# Patient Record
Sex: FEMALE | Race: WHITE | NOT HISPANIC OR LATINO | Employment: FULL TIME | URBAN - METROPOLITAN AREA
[De-identification: names, ages, dates, MRNs, and addresses within clinical notes are randomized per-mention and may not be internally consistent; named-entity substitution may affect disease eponyms.]

---

## 2017-02-23 ENCOUNTER — TRANSCRIBE ORDERS (OUTPATIENT)
Dept: RADIOLOGY | Facility: HOSPITAL | Age: 52
End: 2017-02-23

## 2017-02-23 ENCOUNTER — HOSPITAL ENCOUNTER (OUTPATIENT)
Dept: RADIOLOGY | Facility: HOSPITAL | Age: 52
Discharge: HOME | End: 2017-02-23
Attending: RADIOLOGY

## 2017-02-23 DIAGNOSIS — R07.9 CHEST PAIN, UNSPECIFIED: ICD-10-CM

## 2017-02-23 DIAGNOSIS — R07.9 CHEST PAIN, UNSPECIFIED: Primary | ICD-10-CM

## 2017-02-23 PROCEDURE — 71101 X-RAY EXAM UNILAT RIBS/CHEST: CPT

## 2017-03-11 ENCOUNTER — ALLSCRIPTS OFFICE VISIT (OUTPATIENT)
Dept: OTHER | Facility: OTHER | Age: 52
End: 2017-03-11

## 2017-03-11 ENCOUNTER — APPOINTMENT (OUTPATIENT)
Dept: LAB | Facility: HOSPITAL | Age: 52
End: 2017-03-11
Payer: COMMERCIAL

## 2017-03-11 DIAGNOSIS — R50.9 FEVER: ICD-10-CM

## 2017-03-11 LAB
FLUAV AG SPEC QL IA: NEGATIVE
FLUBV AG SPEC QL IA: NEGATIVE

## 2017-03-11 PROCEDURE — 87798 DETECT AGENT NOS DNA AMP: CPT

## 2017-03-11 PROCEDURE — 87400 INFLUENZA A/B EACH AG IA: CPT

## 2017-03-12 LAB
FLUAV AG SPEC QL: NORMAL
FLUBV AG SPEC QL: NORMAL
RSV B RNA SPEC QL NAA+PROBE: NORMAL

## 2017-03-29 ENCOUNTER — APPOINTMENT (OUTPATIENT)
Dept: LAB | Facility: CLINIC | Age: 52
End: 2017-03-29
Payer: COMMERCIAL

## 2017-03-29 ENCOUNTER — TRANSCRIBE ORDERS (OUTPATIENT)
Dept: LAB | Facility: CLINIC | Age: 52
End: 2017-03-29

## 2017-03-29 DIAGNOSIS — R31.29 MICROSCOPIC HEMATURIA: ICD-10-CM

## 2017-03-29 DIAGNOSIS — R31.29 MICROSCOPIC HEMATURIA: Primary | ICD-10-CM

## 2017-03-29 DIAGNOSIS — R31.29 OTHER MICROSCOPIC HEMATURIA: ICD-10-CM

## 2017-03-29 LAB
BACTERIA UR QL AUTO: ABNORMAL /HPF
BILIRUB UR QL STRIP: NEGATIVE
CLARITY UR: CLEAR
COLOR UR: YELLOW
GLUCOSE UR STRIP-MCNC: NEGATIVE MG/DL
HGB UR QL STRIP.AUTO: ABNORMAL
KETONES UR STRIP-MCNC: NEGATIVE MG/DL
LEUKOCYTE ESTERASE UR QL STRIP: ABNORMAL
NITRITE UR QL STRIP: NEGATIVE
NON-SQ EPI CELLS URNS QL MICRO: ABNORMAL /HPF
PH UR STRIP.AUTO: 7.5 [PH] (ref 4.5–8)
PROT UR STRIP-MCNC: NEGATIVE MG/DL
RBC #/AREA URNS AUTO: ABNORMAL /HPF
SP GR UR STRIP.AUTO: 1.01 (ref 1–1.03)
UROBILINOGEN UR QL STRIP.AUTO: 0.2 E.U./DL
WBC #/AREA URNS AUTO: ABNORMAL /HPF

## 2017-03-29 PROCEDURE — 81001 URINALYSIS AUTO W/SCOPE: CPT

## 2017-03-29 PROCEDURE — 88112 CYTOPATH CELL ENHANCE TECH: CPT

## 2017-04-03 ENCOUNTER — ALLSCRIPTS OFFICE VISIT (OUTPATIENT)
Dept: OTHER | Facility: OTHER | Age: 52
End: 2017-04-03

## 2017-04-14 ENCOUNTER — HOSPITAL ENCOUNTER (OUTPATIENT)
Dept: MRI IMAGING | Facility: HOSPITAL | Age: 52
Discharge: HOME | End: 2017-04-14
Attending: SURGERY
Payer: COMMERCIAL

## 2017-04-14 DIAGNOSIS — K86.2 CYST OF PANCREAS: ICD-10-CM

## 2017-04-14 PROCEDURE — 74183 MRI ABD W/O CNTR FLWD CNTR: CPT

## 2017-04-14 PROCEDURE — A9585 GADOBUTROL INJECTION: HCPCS | Performed by: SURGERY

## 2017-04-14 RX ADMIN — GADOBUTROL 6 ML: 604.72 INJECTION INTRAVENOUS at 17:30

## 2017-06-16 ENCOUNTER — HOSPITAL ENCOUNTER (EMERGENCY)
Facility: HOSPITAL | Age: 52
Discharge: HOME | End: 2017-06-16
Attending: EMERGENCY MEDICINE | Admitting: EMERGENCY MEDICINE
Payer: OTHER MISCELLANEOUS

## 2017-06-16 VITALS
DIASTOLIC BLOOD PRESSURE: 85 MMHG | TEMPERATURE: 98.5 F | HEART RATE: 92 BPM | SYSTOLIC BLOOD PRESSURE: 127 MMHG | RESPIRATION RATE: 17 BRPM | OXYGEN SATURATION: 96 % | BODY MASS INDEX: 25.71 KG/M2 | WEIGHT: 160 LBS | HEIGHT: 66 IN

## 2017-06-16 DIAGNOSIS — IMO0002 NEEDLE STICK INJURY: Primary | ICD-10-CM

## 2017-06-16 LAB — ALT SERPL W P-5'-P-CCNC: 28 U/L (ref 12–78)

## 2017-06-16 PROCEDURE — 90715 TDAP VACCINE 7 YRS/> IM: CPT | Performed by: EMERGENCY MEDICINE

## 2017-06-16 PROCEDURE — 87340 HEPATITIS B SURFACE AG IA: CPT | Performed by: EMERGENCY MEDICINE

## 2017-06-16 PROCEDURE — 36415 COLL VENOUS BLD VENIPUNCTURE: CPT | Performed by: EMERGENCY MEDICINE

## 2017-06-16 PROCEDURE — 86706 HEP B SURFACE ANTIBODY: CPT | Performed by: EMERGENCY MEDICINE

## 2017-06-16 PROCEDURE — 84460 ALANINE AMINO (ALT) (SGPT): CPT | Performed by: EMERGENCY MEDICINE

## 2017-06-16 PROCEDURE — 99283 EMERGENCY DEPT VISIT LOW MDM: CPT

## 2017-06-16 PROCEDURE — 90471 IMMUNIZATION ADMIN: CPT

## 2017-06-16 PROCEDURE — 87389 HIV-1 AG W/HIV-1&-2 AB AG IA: CPT | Performed by: EMERGENCY MEDICINE

## 2017-06-16 PROCEDURE — 86803 HEPATITIS C AB TEST: CPT | Performed by: EMERGENCY MEDICINE

## 2017-06-16 RX ADMIN — TETANUS TOXOID, REDUCED DIPHTHERIA TOXOID AND ACELLULAR PERTUSSIS VACCINE, ADSORBED 0.5 ML: 5; 2.5; 8; 8; 2.5 SUSPENSION INTRAMUSCULAR at 14:32

## 2017-06-17 LAB
HBV SURFACE AB SER-ACNC: 211.26 MIU/ML
HBV SURFACE AG SER QL: NORMAL
HCV AB SER QL: NORMAL

## 2017-06-19 LAB — HIV 1+2 AB+HIV1 P24 AG SERPL QL IA: NORMAL

## 2017-07-13 ENCOUNTER — APPOINTMENT (OUTPATIENT)
Dept: LAB | Facility: HOSPITAL | Age: 52
End: 2017-07-13
Payer: COMMERCIAL

## 2017-07-13 ENCOUNTER — TRANSCRIBE ORDERS (OUTPATIENT)
Dept: LAB | Facility: HOSPITAL | Age: 52
End: 2017-07-13

## 2017-07-13 DIAGNOSIS — Z00.8 HEALTH EXAMINATION IN POPULATION SURVEY: Primary | ICD-10-CM

## 2017-07-13 DIAGNOSIS — Z00.8 HEALTH EXAMINATION IN POPULATION SURVEY: ICD-10-CM

## 2017-07-13 LAB
CHOLEST SERPL-MCNC: 208 MG/DL (ref 50–200)
EST. AVERAGE GLUCOSE BLD GHB EST-MCNC: 117 MG/DL
HBA1C MFR BLD: 5.7 % (ref 4.2–6.3)
HDLC SERPL-MCNC: 63 MG/DL (ref 40–60)
LDLC SERPL CALC-MCNC: 134 MG/DL (ref 0–100)
TRIGL SERPL-MCNC: 55 MG/DL

## 2017-07-13 PROCEDURE — 83036 HEMOGLOBIN GLYCOSYLATED A1C: CPT

## 2017-07-13 PROCEDURE — 80061 LIPID PANEL: CPT

## 2017-07-13 PROCEDURE — 36415 COLL VENOUS BLD VENIPUNCTURE: CPT

## 2017-07-24 ENCOUNTER — ALLSCRIPTS OFFICE VISIT (OUTPATIENT)
Dept: OTHER | Facility: OTHER | Age: 52
End: 2017-07-24

## 2017-07-24 ENCOUNTER — HOSPITAL ENCOUNTER (OUTPATIENT)
Dept: RADIOLOGY | Facility: HOSPITAL | Age: 52
Discharge: HOME | End: 2017-07-24
Attending: ORTHOPAEDIC SURGERY
Payer: COMMERCIAL

## 2017-07-24 DIAGNOSIS — M25.511 PAIN IN RIGHT SHOULDER: ICD-10-CM

## 2017-07-24 PROCEDURE — 73030 X-RAY EXAM OF SHOULDER: CPT

## 2017-08-29 ENCOUNTER — ALLSCRIPTS OFFICE VISIT (OUTPATIENT)
Dept: OTHER | Facility: OTHER | Age: 52
End: 2017-08-29

## 2017-09-22 ENCOUNTER — TRANSCRIBE ORDERS (OUTPATIENT)
Dept: ADMINISTRATIVE | Facility: HOSPITAL | Age: 52
End: 2017-09-22

## 2017-09-22 DIAGNOSIS — M25.511 ACUTE PAIN OF RIGHT SHOULDER: Primary | ICD-10-CM

## 2017-10-17 ENCOUNTER — HOSPITAL ENCOUNTER (OUTPATIENT)
Dept: RADIOLOGY | Facility: HOSPITAL | Age: 52
Discharge: HOME | End: 2017-10-17
Attending: ORTHOPAEDIC SURGERY
Payer: OTHER MISCELLANEOUS

## 2017-10-17 DIAGNOSIS — M25.511 ACUTE PAIN OF RIGHT SHOULDER: ICD-10-CM

## 2017-10-17 PROCEDURE — 73221 MRI JOINT UPR EXTREM W/O DYE: CPT

## 2017-10-23 ENCOUNTER — ALLSCRIPTS OFFICE VISIT (OUTPATIENT)
Dept: OTHER | Facility: OTHER | Age: 52
End: 2017-10-23

## 2017-10-23 DIAGNOSIS — R94.6 ABNORMAL RESULTS OF THYROID FUNCTION STUDIES: ICD-10-CM

## 2017-10-23 DIAGNOSIS — R31.29 OTHER MICROSCOPIC HEMATURIA: ICD-10-CM

## 2017-10-23 DIAGNOSIS — M25.511 PAIN IN RIGHT SHOULDER: ICD-10-CM

## 2017-10-23 DIAGNOSIS — R53.83 OTHER FATIGUE: ICD-10-CM

## 2017-11-08 ENCOUNTER — GENERIC CONVERSION - ENCOUNTER (OUTPATIENT)
Dept: OTHER | Facility: OTHER | Age: 52
End: 2017-11-08

## 2017-11-15 ENCOUNTER — HOSPITAL ENCOUNTER (OUTPATIENT)
Dept: RADIOLOGY | Facility: HOSPITAL | Age: 52
Discharge: HOME | End: 2017-11-15
Payer: COMMERCIAL

## 2017-11-15 DIAGNOSIS — R94.6 ABNORMAL RESULTS OF THYROID FUNCTION STUDIES: ICD-10-CM

## 2017-11-15 PROCEDURE — 76536 US EXAM OF HEAD AND NECK: CPT

## 2017-11-16 ENCOUNTER — TRANSCRIBE ORDERS (OUTPATIENT)
Dept: LAB | Facility: HOSPITAL | Age: 52
End: 2017-11-16

## 2017-11-16 DIAGNOSIS — R31.29 MICROSCOPIC HEMATURIA: ICD-10-CM

## 2017-11-16 DIAGNOSIS — M25.511 RIGHT SHOULDER PAIN, UNSPECIFIED CHRONICITY: Primary | ICD-10-CM

## 2017-11-16 DIAGNOSIS — R53.83 OTHER FATIGUE: ICD-10-CM

## 2017-11-17 ENCOUNTER — APPOINTMENT (OUTPATIENT)
Dept: LAB | Facility: HOSPITAL | Age: 52
End: 2017-11-17
Payer: COMMERCIAL

## 2017-11-17 DIAGNOSIS — M25.511 RIGHT SHOULDER PAIN, UNSPECIFIED CHRONICITY: ICD-10-CM

## 2017-11-17 DIAGNOSIS — R31.29 MICROSCOPIC HEMATURIA: ICD-10-CM

## 2017-11-17 DIAGNOSIS — R53.83 OTHER FATIGUE: ICD-10-CM

## 2017-11-17 LAB
ALBUMIN SERPL BCP-MCNC: 3.8 G/DL (ref 3.5–5)
ALP SERPL-CCNC: 62 U/L (ref 46–116)
ALT SERPL W P-5'-P-CCNC: 23 U/L (ref 12–78)
ANION GAP SERPL CALCULATED.3IONS-SCNC: 5 MMOL/L (ref 4–13)
AST SERPL W P-5'-P-CCNC: 16 U/L (ref 5–45)
BILIRUB SERPL-MCNC: 0.48 MG/DL (ref 0.2–1)
BUN SERPL-MCNC: 13 MG/DL (ref 5–25)
CALCIUM SERPL-MCNC: 9 MG/DL (ref 8.3–10.1)
CHLORIDE SERPL-SCNC: 105 MMOL/L (ref 100–108)
CO2 SERPL-SCNC: 29 MMOL/L (ref 21–32)
CREAT SERPL-MCNC: 0.66 MG/DL (ref 0.6–1.3)
GFR SERPL CREATININE-BSD FRML MDRD: 101 ML/MIN/1.73SQ M
GLUCOSE P FAST SERPL-MCNC: 81 MG/DL (ref 65–99)
IRON SERPL-MCNC: 98 UG/DL (ref 50–170)
POTASSIUM SERPL-SCNC: 4.1 MMOL/L (ref 3.5–5.3)
PROT SERPL-MCNC: 7.7 G/DL (ref 6.4–8.2)
SODIUM SERPL-SCNC: 139 MMOL/L (ref 136–145)
TSH SERPL DL<=0.05 MIU/L-ACNC: 2.3 UIU/ML (ref 0.36–3.74)

## 2017-11-17 PROCEDURE — 36415 COLL VENOUS BLD VENIPUNCTURE: CPT

## 2017-11-17 PROCEDURE — 83540 ASSAY OF IRON: CPT

## 2017-11-17 PROCEDURE — 80053 COMPREHEN METABOLIC PANEL: CPT

## 2017-11-17 PROCEDURE — 84443 ASSAY THYROID STIM HORMONE: CPT

## 2017-11-20 ENCOUNTER — GENERIC CONVERSION - ENCOUNTER (OUTPATIENT)
Dept: OTHER | Facility: OTHER | Age: 52
End: 2017-11-20

## 2017-11-24 ENCOUNTER — ALLSCRIPTS OFFICE VISIT (OUTPATIENT)
Dept: OTHER | Facility: OTHER | Age: 52
End: 2017-11-24

## 2017-12-19 ENCOUNTER — ALLSCRIPTS OFFICE VISIT (OUTPATIENT)
Dept: OTHER | Facility: OTHER | Age: 52
End: 2017-12-19

## 2017-12-19 PROCEDURE — 88175 CYTOPATH C/V AUTO FLUID REDO: CPT | Performed by: OBSTETRICS & GYNECOLOGY

## 2017-12-20 DIAGNOSIS — Z12.31 ENCOUNTER FOR SCREENING MAMMOGRAM FOR MALIGNANT NEOPLASM OF BREAST: ICD-10-CM

## 2017-12-20 NOTE — PROGRESS NOTES
Assessment  1  Encounter for screening mammogram for malignant neoplasm of breast (V76 12) (Z12 31)   2  Pap smear, as part of routine gynecological examination (V76 2) (Z01 419)  Pelvic exam reveals the uterus to be anterior well supported mobile normal size  No ovarian masses are detected  There is a mucousy discharge in the vagina  There is no blood present the vulva appears normal  Rectal exam shows no blood or masses in the rectum there is no nodularity in the cul-de-sac  Breast exam is normal    Plan  Encounter for screening mammogram for malignant neoplasm of breast    · * MAMMO SCREENING BILATERAL W CAD; Status:Active; Requested for:[id number];    Perform:[facility]; Due:56Rza9251; Ordered;For:Encounter for screening mammogram for malignant neoplasm of breast; Ordered By:Malina Linton;  Encounter for screening mammogram for malignant neoplasm of breast, Pap smear, aspart of routine gynecological examination    · Follow-up visit in 1 year Evaluation and Treatment  Follow-up  Status: Hold For -Scheduling  Requested for: 30ZWL1749   Ordered; For: Encounter for screening mammogram for malignant neoplasm of breast, Pap smear, as part of routine gynecological examination; Ordered By: Akua Hamilton Performed:  Due: 59EGC7539  Pap smear, as part of routine gynecological examination    · (1) THIN PREP PAP FOLLOW UP WITH IMAGING; Status:Active - RetrospectiveAuthorization; Requested for:[id number];    Perform:[facility] In Office Collection; Due:53Wuv6829; Last Updated By:Emil Bhatia; 12/19/2017 3:37:40 PM;Ordered; For:Pap smear, as part of routine gynecological examination; Ordered By:Malina Linton; Maturation index required? : No  :   HPV? : if ASCUS  Pap smear was taken at this visit  The patient was given a slip for bilateral screening mammogram to be performed after December 20, 2017  She will return in one year for follow-up GYN evaluation     Discussion/Summary    The patient was informed that her breast and pelvic exam are normal  She will call she sees any bleeding over the next year  Chief Complaint  annual exam      History of Present Illness  HPI: This 76-year-old patient has had no vaginal bleeding over the past 2 years  She has had one episode of vaginitis past year  She has no pain with intercourse  She does sometimes or liner for urine stress incontinence  At the present time she does notice that she seems to be urinating more frequently she has pressure in the lower abdomen suprapubically and she does urinate but small amounts when she does urinate  She has occasional hot flashes but no chronic headaches or fainting spells  Due to the suspicion of a cystitis I did give patient a prescription for Macrobid one twice a day after meals for 7 days  She does sleep intermittently  GYN HM, Adult Female [facility]:   General Health:  Lifestyle:  She does not use tobacco -- She consumes alcohol  She reports occasional alcohol use  -- She denies drug use  Reproductive health: the patient is postmenopausal--   she reports no menstrual problems  -- she uses no contraception  -- she is sexually active  -- pregnancy history: G 1P 1,-- 1  Screening: Cervical cancer screening includes a pap smear performed 12/2016  Breast cancer screening includes a mammogram performed 12/20/2016  Colorectal cancer screening includes a colonoscopy performed 5/11/2015  Review of Systems   Constitutional: No fever, no chills, feels well, no tiredness, no recent weight gain or loss  ENT: no ear ache, no loss of hearing, no nosebleeds or nasal discharge, no sore throat or hoarseness  Cardiovascular: no complaints of slow or fast heart rate, no chest pain, no palpitations, no leg claudication or lower extremity edema  Respiratory: no complaints of shortness of breath, no wheezing, no dyspnea on exertion, no orthopnea or PND  Breasts: no complaints of breast pain, breast lump or nipple discharge  Gastrointestinal: no complaints of abdominal pain, no constipation, no nausea or diarrhea, no vomiting, no bloody stools  Genitourinary: incontinence, but-- no complaints of dysuria, no incontinence, no pelvic pain, no dysmenorrhea, no vaginal discharge or abnormal vaginal bleeding-- and-- as noted in HPI  Musculoskeletal: no complaints of arthralgia, no myalgia, no joint swelling or stiffness, no limb pain or swelling  Integumentary: no complaints of skin rash or lesion, no itching or dry skin, no skin wounds  Neurological: no complaints of headache, no confusion, no numbness or tingling, no dizziness or fainting  Active Problems  1  Acute bacterial sinusitis (461 9) (J01 90,B96 89)   2  Arthralgia of right acromioclavicular joint (719 41) (M25 511)   3  Cyst of pancreas (577 2) (K86 2)   4  Encounter for screening colonoscopy (V76 51) (Z12 11)   5  Encounter for screening mammogram for malignant neoplasm of breast (V76 12) (Z12 31)   6  Fatigue (780 79) (R53 83)   7  Fever (780 60) (R50 9)   8  Flu-like symptoms (780 99) (R68 89)   9  Microscopic hematuria (599 72) (R31 29)   10  Nonspecific abnormal results of function study of thyroid (794 5) (R94 6)   11  Right flank pain (789 09) (R10 9)   12  Shoulder pain, right (719 41) (M25 511)   13  Situational anxiety (300 09) (F41 8)   14   Tear of lateral meniscus of knee, right, initial encounter    Past Medical History   · History of kidney disease (V13 09) (X16 043)   · History of kidney stones (V13 01) (Z87 442)   · History of Lyme disease (V12 09) (Z86 19)   · History of urinary tract infection (V13 02) (Z87 440)   · History of Plantar fasciitis, left (728 71) (M72 2)   · History of Stress incontinence, female (625 6) (N39 3)   · History of Uterine cyst (621 8) (N85 8)    Surgical History   · History of Diagnostic Cystoscopy   · History of Dilation And Curettage    Family History  Mother    · Family history of skin cancer (V16 8) (Z80 8)   · Family history of Obesity  Father    · Family history of Diabetes   · Family history of Heart problem  Maternal Grandmother    · Family history of Congestive heart failure (CHF)   · Family history of   Paternal Grandmother    · Family history of   Maternal Grandfather    · Family history of Cancer of prostate   · Family history of   Paternal Grandfather    · Family history of   Aunt    · Family history of malignant neoplasm of breast (V16 3) (Z80 3)  Uncle    · Family history of malignant neoplasm of prostate (V16 42) (Z80 42)    Social History   · Caffeine use (V49 89) (F15 90)   · Drinks coffee   · Denied: History of Drug use   · Never a smoker    Current Meds   1  Amoxicillin 875 MG Oral Tablet; Take 1 tablet twice daily; Therapy: 14SSH3532 to (Wen Cooney)  Requested for: 89KPW3262; Last Rx:2017 Ordered   2  Calcium TABS; Therapy: (Recorded:2014) to Recorded   3  Fish Oil CAPS; Therapy: (Recorded:2014) to Recorded   4  Iron TABS; Therapy: (Recorded:2014) to Recorded   5  LORazepam 0 5 MG Oral Tablet; TAKE 1 TABLET Once PRN dental work; Therapy: 06JDH4149 to (Evaluate:11Ctn2642); Last Rx:2017 Ordered   6  Multi-Vitamin TABS; TAKE 1 TABLET DAILY; Therapy: (Recorded:2014) to Recorded    Allergies  1  No Known Drug Allergies    Vitals   Recorded: 02KJQ3280 41:26GK   Systolic 533   Diastolic 74   Height 5 ft 6 in   Weight 160 lb    BMI Calculated 25 82   BSA Calculated 1 82   LMP      Physical Exam   Constitutional  General appearance: No acute distress, well appearing and well nourished  Neck  Neck: Normal, supple, trachea midline, no masses  Thyroid: Normal, no thyromegaly  Pulmonary  Respiratory effort: No increased work of breathing or signs of respiratory distress  Auscultation of lungs: Clear to auscultation  Cardiovascular  Auscultation of heart: Normal rate and rhythm, normal S1 and S2, no murmurs     Peripheral vascular exam: Normal pulses Throughout  Genitourinary  External genitalia: Normal and no lesions appreciated  Vagina: Normal, no lesions or dryness appreciated  Urethra: Normal    Urethral meatus: Normal    Bladder: Normal, soft, non-tender and no prolapse or masses appreciated  Cervix: Normal, no palpable masses  Uterus: Normal, non-tender, not enlarged, and no palpable masses  Adnexa/parametria: Normal, non-tender and no fullness or masses appreciated  Anus, perineum, and rectum: Normal sphincter tone, no masses, and no prolapse  Chest  Breasts: Normal and no dimpling or skin changes noted  Abdomen  Abdomen: Normal, non-tender, and no organomegaly noted  Liver and spleen: No hepatomegaly or splenomegaly  Examination for hernias: No hernias appreciated  Lymphatic  Palpation of lymph nodes in neck, axillae, groin and/or other locations: No lymphadenopathy or masses noted  Skin  Skin and subcutaneous tissue: Normal skin turgor and no rashes     Palpation of skin and subcutaneous tissue: Normal    Psychiatric  Orientation to person, place, and time: Normal    Mood and affect: Normal        Signatures   Electronically signed by : Laure Cantrell MD; Dec 19 2017  3:47PM EST                       (Author)

## 2017-12-21 ENCOUNTER — LAB REQUISITION (OUTPATIENT)
Dept: LAB | Facility: HOSPITAL | Age: 52
End: 2017-12-21
Payer: COMMERCIAL

## 2017-12-21 DIAGNOSIS — Z01.419 ENCOUNTER FOR GYNECOLOGICAL EXAMINATION WITHOUT ABNORMAL FINDING: ICD-10-CM

## 2017-12-28 LAB
LAB AP GYN PRIMARY INTERPRETATION: NORMAL
LAB AP LMP: NORMAL
Lab: NORMAL

## 2017-12-29 ENCOUNTER — GENERIC CONVERSION - ENCOUNTER (OUTPATIENT)
Dept: OTHER | Facility: OTHER | Age: 52
End: 2017-12-29

## 2018-01-03 ENCOUNTER — HOSPITAL ENCOUNTER (OUTPATIENT)
Dept: RADIOLOGY | Facility: HOSPITAL | Age: 53
Discharge: HOME | End: 2018-01-03
Payer: COMMERCIAL

## 2018-01-03 DIAGNOSIS — Z12.31 ENCOUNTER FOR SCREENING MAMMOGRAM FOR MALIGNANT NEOPLASM OF BREAST: ICD-10-CM

## 2018-01-03 PROCEDURE — 77067 SCR MAMMO BI INCL CAD: CPT

## 2018-01-08 ENCOUNTER — ALLSCRIPTS OFFICE VISIT (OUTPATIENT)
Dept: OTHER | Facility: OTHER | Age: 53
End: 2018-01-08

## 2018-01-08 ENCOUNTER — GENERIC CONVERSION - ENCOUNTER (OUTPATIENT)
Dept: OTHER | Facility: OTHER | Age: 53
End: 2018-01-08

## 2018-01-08 PROCEDURE — 88305 TISSUE EXAM BY PATHOLOGIST: CPT | Performed by: OBSTETRICS & GYNECOLOGY

## 2018-01-09 ENCOUNTER — LAB REQUISITION (OUTPATIENT)
Dept: LAB | Facility: HOSPITAL | Age: 53
End: 2018-01-09
Payer: COMMERCIAL

## 2018-01-09 DIAGNOSIS — N95.0 POSTMENOPAUSAL BLEEDING: ICD-10-CM

## 2018-01-09 NOTE — RESULT NOTES
Discussion/Summary   labs in normal range, Continue medications     Verified Results  (1) COMPREHENSIVE METABOLIC PANEL 94OSH1392 38:04KV Samaria Farmer     Test Name Result Flag Reference   SODIUM 139 mmol/L  136-145   POTASSIUM 4 1 mmol/L  3 5-5 3   CHLORIDE 105 mmol/L  100-108   CARBON DIOXIDE 29 mmol/L  21-32   ANION GAP (CALC) 5 mmol/L  4-13   BLOOD UREA NITROGEN 13 mg/dL  5-25   CREATININE 0 66 mg/dL  0 60-1 30   Standardized to IDMS reference method   CALCIUM 9 0 mg/dL  8 3-10 1   BILI, TOTAL 0 48 mg/dL  0 20-1 00   ALK PHOSPHATAS 62 U/L     ALT (SGPT) 23 U/L  12-78   Specimen collection should occur prior to Sulfasalazine and/or Sulfapyridine administration due to the potential for falsely depressed results  AST(SGOT) 16 U/L  5-45   Specimen collection should occur prior to Sulfasalazine administration due to the potential for falsely depressed results  ALBUMIN 3 8 g/dL  3 5-5 0   TOTAL PROTEIN 7 7 g/dL  6 4-8 2   eGFR 101 ml/min/1 73sq m     [facility] Disease Education Program recommendations are as follows:  GFR calculation is accurate only with a steady state creatinine  Chronic Kidney disease less than 60 ml/min/1 73 sq  meters  Kidney failure less than 15 ml/min/1 73 sq  meters  GLUCOSE FASTING 81 mg/dL  65-99   Specimen collection should occur prior to Sulfasalazine administration due to the potential for falsely depressed results  Specimen collection should occur prior to Sulfapyridine administration due to the potential for falsely elevated results  (1) IRON 13UOX5315 10:10AM Samaria Farmer [facility]     Test Name Result Flag Reference   IRON 98 ug/dL     Patients treated with metal-binding drugs (ie  Deferoxamine) may have depressed iron values  (1) TSH 02XZE4191 10:10AM Samaria Farmer     Test Name Result Flag Reference   TSH 2 300 uIU/mL  0 358-3 740   This is a patient instruction: This test is non-fasting  Please drink two glasses of water morning of bloodwork  Patients undergoing fluorescein dye angiography may retain small amounts of fluorescein in the body for 48-72 hours post procedure  Samples containing fluorescein can produce falsely depressed TSH values  If the patient had this procedure,a specimen should be resubmitted post fluorescein clearance            The recommended reference ranges for TSH during pregnancy are as follows:  First trimester 0 1 to 2 5 uIU/mL  Second trimester  0 2 to 3 0 uIU/mL  Third trimester 0 3 to 3 0 uIU/m

## 2018-01-09 NOTE — MISCELLANEOUS
Message  Return to work or school:   Renea Torres is under my professional care   She was seen in my office on 03/11/2017             Signatures   Electronically signed by : MARINE Shipley ; Mar 12 2017  8:34AM EST                       (Author)

## 2018-01-10 ENCOUNTER — LAB CONVERSION - ENCOUNTER (OUTPATIENT)
Dept: OTHER | Facility: OTHER | Age: 53
End: 2018-01-10

## 2018-01-11 NOTE — MISCELLANEOUS
Message  Return to work or school:   Angelique Wright is under my professional care  She was seen in my office on 11-24-17  next scheduled      Sinusitis     Bridgett Garnica       Signatures   Electronically signed by : Bridgett Garnica MD; Nov 24 2017 12:44PM EST                       (Author)

## 2018-01-12 ENCOUNTER — GENERIC CONVERSION - ENCOUNTER (OUTPATIENT)
Dept: OTHER | Facility: OTHER | Age: 53
End: 2018-01-12

## 2018-01-12 VITALS
BODY MASS INDEX: 25.79 KG/M2 | DIASTOLIC BLOOD PRESSURE: 74 MMHG | HEIGHT: 66 IN | SYSTOLIC BLOOD PRESSURE: 106 MMHG | WEIGHT: 160.5 LBS | HEART RATE: 92 BPM

## 2018-01-12 VITALS
DIASTOLIC BLOOD PRESSURE: 72 MMHG | WEIGHT: 159 LBS | BODY MASS INDEX: 25.66 KG/M2 | HEART RATE: 92 BPM | TEMPERATURE: 102.3 F | RESPIRATION RATE: 16 BRPM | SYSTOLIC BLOOD PRESSURE: 118 MMHG

## 2018-01-13 VITALS
DIASTOLIC BLOOD PRESSURE: 79 MMHG | HEIGHT: 66 IN | BODY MASS INDEX: 26.24 KG/M2 | SYSTOLIC BLOOD PRESSURE: 124 MMHG | HEART RATE: 101 BPM | WEIGHT: 163.25 LBS

## 2018-01-14 VITALS
SYSTOLIC BLOOD PRESSURE: 115 MMHG | HEART RATE: 92 BPM | DIASTOLIC BLOOD PRESSURE: 75 MMHG | BODY MASS INDEX: 26.01 KG/M2 | WEIGHT: 161.13 LBS

## 2018-01-16 NOTE — PROGRESS NOTES
Assessment    1  Flu-like symptoms (780 99) (R68 89)    Plan  Fever    · (1) RAPID INFLUENZA SCREEN with reflex to PCR; Status:Active - Retrospective By  Protocol Authorization;  Requested for:11Mar2017;   Flu-like symptoms    · Oseltamivir Phosphate 75 MG Oral Capsule (Tamiflu); take 1 tablet twice daily for 5  days   · Drink at least 6 glasses of water or juice a day ; Status:Complete;   Done: 30EBA6110   · Good hand washing is one of the best ways to control the spread of germs ;  Status:Complete;   Done: 74MIM5868   · Rest in bed until your temperature returns to normal ; Status:Complete;   Done:  29OXI1908   · Stay home from work or school until your condition is improved ; Status:Complete;    Done: 05QOH4213   · The following home treatments may soothe a sore throat ; Status:Complete;   Done:  53XBF9714   · Use a cool mist humidifier in the room ; Status:Complete;   Done: 40LNQ9347   · You may slowly resume your normal level of activity once you feel better ;  Status:Complete;   Done: 12TEC4136   · Seek Immediate Medical Attention if: Breathing starts to have a wheeze or whistling  sound ; Status:Complete;   Done: 40JMN7673   · Seek Immediate Medical Attention if: The fever continues or becomes higher ;  Status:Complete;   Done: 71OZN3197   · Seek Immediate Medical Attention if: You are feeling short of breath ; Status:Complete;    Done: 72VDM9812   · Seek Immediate Medical Attention if: You become dehydrated ; Status:Complete;   Done:  26RGE1892   · Seek Immediate Medical Attention if: You feel short of breath even while resting ;  Status:Complete;   Done: 77REJ9042   · Seek Immediate Medical Attention if: You have a fever, headache, and vomiting, or have a  stiff neck ; Status:Complete;   Done: 20AFA6757   · Seek Immediate Medical Attention if: You have pain in the chest that gets worse with  deep breathing or coughing ; Status:Complete;   Done: 21UHX1647    Discussion/Summary  Discussion Summary: Flu-like symptoms   - nasal swab sent for flu testing   - Tamiflu x 5 days prescribed   - should rest and drink plenty of fluids   - take Tylenol or Motrin as needed   - run a humidifier at home   - if symptoms persist despite treatment, follow up w/ PCP for re-check on Monday   - patient verbalizes understanding and agrees w/ treatment plan  Medication Side Effects Reviewed: Possible side effects of new medications were reviewed with the patient/guardian today  Understands and agrees with treatment plan: The treatment plan was reviewed with the patient/guardian  The patient/guardian understands and agrees with the treatment plan   Counseling Documentation With Imm: The patient was counseled regarding instructions for management, patient and family education, importance of compliance with treatment  Follow Up Instructions: Follow Up with your Primary Care Provider in 3-5 days  If your symptoms worsen, go to the nearest [facility]  Chief Complaint    1  Cold Symptoms  Chief Complaint Free Text Note Form: c/o congestion, chills, headache since last night   History of Present Illness  HPI: 44 yo female presents c/o headache, body ache, chills, sore throat, nasal congestion, rhinorrhea, cough since last night  States symptoms were of rapid onset  Felt feverish but did not take her temperature  No chest pain, SOB, or wheezing  Non-smoker  No GI sx or skin rashes  No recent travel or known sick contacts  Did get flu shot this year  Has been taking Tylenol as needed  Review of Systems  Focused-Female:   Constitutional: feeling poorly, chills and feeling tired, but as noted in HPI    ENT: as noted in HPI  Cardiovascular: no complaints of slow or fast heart rate, no chest pain, no palpitations, no leg claudication or lower extremity edema, no chest pain and no palpitations  Respiratory: cough, but as noted in HPI, no shortness of breath and no wheezing     Gastrointestinal: no complaints of abdominal pain, no constipation, no nausea or diarrhea, no vomiting, no bloody stools  Genitourinary: no complaints of dysuria, no incontinence, no pelvic pain, no dysmenorrhea, no vaginal discharge or abnormal vaginal bleeding  Musculoskeletal: myalgias, but as noted in HPI  Integumentary: no complaints of skin rash or lesion, no itching or dry skin, no skin wounds  Neurological: headache, but as noted in HPI, no numbness, no tingling, no confusion, no dizziness and no fainting  ROS Reviewed:   ROS reviewed  Active Problems    1  Cyst of pancreas (577 2) (K86 2)   2  Microscopic hematuria (599 72) (R31 29)   3  Right flank pain (789 09) (R10 9)   4  Tear of lateral meniscus of knee, right, initial encounter   5  Trigeminal neuralgia (350 1) (G50 0)    Past Medical History    1  History of kidney disease (V13 09) (Z87 448)   2  History of kidney stones (V13 01) (Z87 442)   3  History of Lyme disease (V12 09) (Z86 19)   4  History of urinary tract infection (V13 02) (Z87 440)   5  History of Plantar fasciitis, left (728 71) (M72 2)   6  History of Stress incontinence, female (625 6) (N39 3)   7  History of Uterine cyst (621 8) (N85 8)  Active Problems And Past Medical History Reviewed: The active problems and past medical history were reviewed and updated today  Family History  Mother    1  Family history of skin cancer (V16 8) (Z80 8)   2  Family history of Obesity  Father    3  Family history of Diabetes   4  Family history of Heart problem  Maternal Grandmother    5  Family history of Congestive heart failure (CHF)   6  Family history of   Paternal Grandmother    9  Family history of   Maternal Grandfather    8  Family history of Cancer of prostate   9  Family history of   Paternal Grandfather    8  Family history of   Aunt    6  Family history of malignant neoplasm of breast (V16 3) (Z80 3)  Uncle    12   Family history of malignant neoplasm of prostate (V16 42) (Z80 42)  Family History Reviewed: The family history was reviewed and updated today  Social History    · Caffeine use (V49 89) (F15 90)   · Drinks coffee   · Denied: History of Drug use   · Never a smoker  Social History Reviewed: The social history was reviewed and updated today  Surgical History    1  History of Diagnostic Cystoscopy   2  History of Dilation And Curettage  Surgical History Reviewed: The surgical history was reviewed and updated today  Current Meds   1  Calcium TABS; Therapy: (Recorded:13Oct2014) to Recorded   2  Fish Oil CAPS; Therapy: (Recorded:13Oct2014) to Recorded   3  Iron TABS; Therapy: (Recorded:13Oct2014) to Recorded   4  Lexapro 5 MG Oral Tablet; Therapy: (Recorded:14Apr2016) to Recorded   5  Multi-Vitamin TABS; TAKE 1 TABLET DAILY; Therapy: (Recorded:13Oct2014) to Recorded  Medication List Reviewed: The medication list was reviewed and updated today  Allergies    1  No Known Drug Allergies    Vitals  Signs   Recorded: 70GCD9738 12:50PM   Temperature: 99 7 F  Heart Rate: 122  Respiration: 18  Systolic: 098  Diastolic: 84  Height: 5 ft 6 in  Weight: 155 lb   BMI Calculated: 25 02  BSA Calculated: 1 79  O2 Saturation: 98, RA    Physical Exam    Constitutional   General appearance: No acute distress, well appearing and well nourished  Eyes   Conjunctiva and lids: No swelling, erythema or discharge  Pupils and irises: Equal, round and reactive to light  Ears, Nose, Mouth, and Throat   External inspection of ears and nose: Normal     Otoscopic examination: Tympanic membranes translucent with normal light reflex  Canals patent without erythema  Nasal mucosa, septum, and turbinates: Normal without edema or erythema  Oropharynx: Normal with no erythema, edema, exudate or lesions  Pulmonary   Respiratory effort: No increased work of breathing or signs of respiratory distress      Auscultation of lungs: Clear to auscultation  Cardiovascular   Auscultation of heart: Normal rate and rhythm, normal S1 and S2, without murmurs  Psychiatric   Orientation to person, place, and time: Normal     Mood and affect: Normal        Future Appointments    Date/Time Provider Specialty Site   04/21/2017 04:00 PM MARINE Hartley   Surgical Oncology [facility]   04/03/2017 08:30 AM Tangela Peters [facility] [address]     Signatures   Electronically signed by : MARINE Mcgowan ; Mar 11 2017  5:05PM EST                       (Author)

## 2018-01-17 NOTE — CONSULTS
Therapy  Rehabilitation Services Referral:   Patient Status: post-operative  Diagnosis: distal clavicel excision  Rehabilitation Services: evaluate and treat patient as needed and initiate a home exercise program    Frequency: 1-3 times per week, for 8 weeks  Please send progress report  I hereby certify that the services indicated above are medically necessary        Signatures   Electronically signed by : Marco Herbert [facility]; Oct 23 2017  2:02PM EST                       (Author)    Electronically signed by : MARINE Tenorio ; Oct 23 2017  4:35PM EST                       (Author)

## 2018-01-17 NOTE — RESULT NOTES
Discussion/Summary   stable thyroid ultrasound  No biopsy required     Verified Results  US THYROID [id number] 10:16AM Jaquelin Farmer Order Number: [id number]   Performing Comments: ? fullness R>L   - Patient Instructions: To schedule this appointment, please contact Central Scheduling at [phone]  Test Name Result Flag Reference   US THYROID (Report)     THYROID ULTRASOUND     INDICATION: R94 6: Abnormal results of thyroid function studies  History taken directly from the electronic ordering system  Question fullness right greater than left  COMPARISON: None  TECHNIQUE:  Ultrasound of the thyroid was performed with a high frequency linear transducer in transverse and sagittal planes including volumetric imaging sweeps as well as traditional still imaging technique  FINDINGS: Normal homogeneous smooth echotexture  Right lobe: 5 7 x 1 4 x 1 6 cm  Left lobe: 3 9 x 1 2 x 1 5 cm  Isthmus: 0 3 cm     Nodule #1   RIGHT midgland nodule measuring 0 7 x 0 3 x 0 5 cm  COMPOSITION: 2 points, solid or almost completely solid   ECHOGENICITY: 1 point, hyperechoic or isoechoic  SHAPE: 0 points, wider-than-tall  MARGIN: 0 points, smooth  ECHOGENIC FOCI: 0 points, none or large comet-tail artifacts  TI-RADS Score: TR 3 (3 points), Mildly suspicious  FNA if >2 5 cm  Follow if >1 5 cm  Nodule #2   LEFT midgland nodule measuring 0 8 x 0 5 x 0 7 cm  COMPOSITION: 2 points, solid or almost completely solid   ECHOGENICITY: 2 points, hypoechoic  SHAPE: 0 points, wider-than-tall  MARGIN: 0 points, smooth  ECHOGENIC FOCI: 0 points, none or large comet-tail artifacts  TI-RADS Score: TR 4 (4-6 points), Moderately suspicious  FNA if > 1 5 cm  Follow if > 1cm  IMPRESSION:     There are only 2 small thyroid nodules as described above  No nodule meets current ACR criteria for requiring biopsy or followup ultrasounds        Reference: ACR Thyroid Imaging, Reporting and Data System (TI-RADS): White Paper of the [facility]   J AM Andres Radiol 9999;53:[id number]  (additional recommendations based on American Thyroid Association 2015 guidelines )       Workstation performed: [id number]     Signed by:   Lawrence Garcia MD   11/20/17

## 2018-01-22 VITALS
HEART RATE: 76 BPM | HEIGHT: 66 IN | BODY MASS INDEX: 26.2 KG/M2 | SYSTOLIC BLOOD PRESSURE: 113 MMHG | WEIGHT: 163 LBS | DIASTOLIC BLOOD PRESSURE: 79 MMHG

## 2018-01-22 VITALS
SYSTOLIC BLOOD PRESSURE: 128 MMHG | WEIGHT: 160 LBS | DIASTOLIC BLOOD PRESSURE: 74 MMHG | HEIGHT: 66 IN | BODY MASS INDEX: 25.71 KG/M2

## 2018-01-22 VITALS
TEMPERATURE: 98.4 F | BODY MASS INDEX: 25.82 KG/M2 | DIASTOLIC BLOOD PRESSURE: 70 MMHG | WEIGHT: 160 LBS | SYSTOLIC BLOOD PRESSURE: 102 MMHG | HEART RATE: 76 BPM | RESPIRATION RATE: 12 BRPM

## 2018-01-23 NOTE — RESULT NOTES
Verified Results  * MAMMO SCREENING BILATERAL W CAD [id number] 10:44AM Adela Rosales Order Number: [id number]    - Patient Instructions: To schedule this appointment, please contact Central Scheduling at [phone]  Do not wear any perfume, powder, lotion or deodorant on breast or underarm area  Please bring your doctors order, referral (if needed) and insurance information with you on the day of the test  Failure to bring this information may result in this test being rescheduled  Arrive 15 minutes prior to your appointment time to register  On the day of your test, please bring any prior mammogram or breast studies with you that were not performed at a [facility]  Failure to bring prior exams may result in your test needing to be rescheduled  Test Name Result Flag Reference   MAMMO SCREENING BILATERAL W CAD (Report)     Patient History:   Patient is postmenopausal and had first child at age 38  Family history of breast cancer at age 28 in paternal aunt,    prostate cancer at age 61 in maternal uncle, prostate cancer at    age 48 in maternal grandfather  No Hormone Replacement Therapy   Patient has never smoked  Patient's BMI is 25 8  Reason for exam: screening, asymptomatic  Mammo Screening Bilateral W CAD: January 3, 2018 - Check In #:    [de-identified]   Bilateral MLO and CC view(s) were taken  XCCL view(s) were taken   of the right breast      Technologist: RT Latia(R)(M)   Prior study comparison: December 20, 2016, mammo screening    bilateral W CAD performed at [address] [place] [address]     December 18, 2015, bilateral digital screening mammogram    performed at [address] [place] [address]      The breast tissue is almost entirely fat  No new dominant soft    tissue mass, architectural distortion or suspicious    calcifications are noted  The skin and nipple contours are    within normal limits  No evidence of malignancy   No significant changes when compared with prior studies  ACR BI-RADSï¾® Assessments: BiRad:1 - Negative     Recommendation:   Routine screening mammogram of both breasts in 1 year  Analyzed by CAD     The patient is scheduled in a reminder system for screening    mammography  8-10% of cancers will be missed on mammography  Management of a    palpable abnormality must be based on clinical grounds  Patients   will be notified of their results via letter from our facility  Accredited by [employer] of [employer] and FDA       Transcription Location: [facility] 98: [id number]     Risk Value(s):   Tyrer-Cuzick 10 Year: 5 700%, Tyrer-Cuzick Lifetime: 20 000%,    Myriad Table: 2 6%, IDANIA 5 Year: 1 5%, NCI Lifetime: 11 6%   Signed by:   Spike Orozco MD   1/3/18

## 2018-01-23 NOTE — MISCELLANEOUS
Message   Recorded as Task   Date: 01/02/2018 02:01 PM, Created By: Tanika Persaud   Task Name: Medical Complaint Callback   Assigned To: Whitney Samuels   Regarding Patient: Daxa Hunter, Status: In Progress   Comment:    Tanika Persaud - 02 Jan 2018 2:01 PM     TASK CREATED  Caller: Self; Medical Complaint; [phone] (Home); [phone] x,,,,, (Work)  Patient called, she saw Dr Dilcia Urban on 12/19, he treated her for a UTI, she finished the course of antibiotics then felt like she had a yeast infection so used 3 day monistat last week  Her symptoms have subsided but yesterday she had pink spotting, brown spotting  Today it is more frequent and pinkish in color  She ahs been  for years  Call her work number if it is before 4:00 at [phone], after that call her cell  Desire Rincon - 02 Jan 2018 2:05 PM     TASK IN PROGRESS   Desire Rincon - 02 Jan 2018 2:30 PM     TASK EDITED  lm for pt per rs for appointment 01/04 18        Active Problems    1  Acute bacterial sinusitis (461 9) (J01 90,B96 89)   2  Arthralgia of right acromioclavicular joint (719 41) (M25 511)   3  Cyst of pancreas (577 2) (K86 2)   4  Encounter for screening colonoscopy (V76 51) (Z12 11)   5  Encounter for screening mammogram for malignant neoplasm of breast (V76 12)   (Z12 31)   6  Fatigue (780 79) (R53 83)   7  Fever (780 60) (R50 9)   8  Flu-like symptoms (780 99) (R68 89)   9  Microscopic hematuria (599 72) (R31 29)   10  Nonspecific abnormal results of function study of thyroid (794 5) (R94 6)   11  Pap smear, as part of routine gynecological examination (V76 2) (Z01 419)   12  Right flank pain (789 09) (R10 9)   13  Shoulder pain, right (719 41) (M25 511)   14  Situational anxiety (300 09) (F41 8)   15  Tear of lateral meniscus of knee, right, initial encounter    Current Meds   1  Amoxicillin 875 MG Oral Tablet; Take 1 tablet twice daily;    Therapy: 25ALY4009 to (Faustino Tobias)  Requested for: 65UXG4170; Last Rx:24Nov2017 Ordered   2  Calcium TABS; Therapy: (Recorded:13Oct2014) to Recorded   3  Fish Oil CAPS; Therapy: (Recorded:13Oct2014) to Recorded   4  Iron TABS; Therapy: (Recorded:13Oct2014) to Recorded   5  LORazepam 0 5 MG Oral Tablet; TAKE 1 TABLET Once PRN dental work; Therapy: 99LXY4083 to (Evaluate:97Ejg4403); Last Rx:08Nov2017 Ordered   6  Multi-Vitamin TABS; TAKE 1 TABLET DAILY; Therapy: (Recorded:13Oct2014) to Recorded    Allergies    1   No Known Drug Allergies    Signatures   Electronically signed by : George Salazar, ; Jan 2 2018  2:30PM EST                       (Author)

## 2018-01-24 VITALS
BODY MASS INDEX: 25.71 KG/M2 | HEIGHT: 66 IN | DIASTOLIC BLOOD PRESSURE: 74 MMHG | WEIGHT: 160 LBS | SYSTOLIC BLOOD PRESSURE: 126 MMHG

## 2018-02-26 NOTE — MISCELLANEOUS
Message   Recorded as Task   Date: 01/12/2018 12:29 PM, Created By: Emily Dangelo   Task Name: Follow Up   Assigned To: Karl Arboleda   Regarding Patient: Octavio Ramon, Status: In Progress   CommentLawrence Lex - 12 Jan 2018 12:29 PM     TASK CREATED  Call Felice Alatorreselma her biopsy showed benign tissue with no signs of any serious changes  Call if she sees any bleeding over the next year  Desire Rincon - 12 Jan 2018 12:55 PM     TASK IN PROGRESS   Desire Rincon - 12 Jan 2018 12:57 PM     TASK EDITED  lm per hippa re biopsy report        Active Problems    1  Acute bacterial sinusitis (461 9) (J01 90,B96 89)   2  Arthralgia of right acromioclavicular joint (719 41) (M25 511)   3  Cyst of pancreas (577 2) (K86 2)   4  Encounter for screening colonoscopy (V76 51) (Z12 11)   5  Encounter for screening mammogram for malignant neoplasm of breast (V76 12)   (Z12 31)   6  Fatigue (780 79) (R53 83)   7  Fever (780 60) (R50 9)   8  Flu-like symptoms (780 99) (R68 89)   9  Microscopic hematuria (599 72) (R31 29)   10  Nonspecific abnormal results of function study of thyroid (794 5) (R94 6)   11  Pap smear, as part of routine gynecological examination (V76 2) (Z01 419)   12  Post-menopausal bleeding (627 1) (N95 0)   13  Right flank pain (789 09) (R10 9)   14  Shoulder pain, right (719 41) (M25 511)   15  Situational anxiety (300 09) (F41 8)   16  Tear of lateral meniscus of knee, right, initial encounter    Current Meds   1  Amoxicillin 875 MG Oral Tablet; Take 1 tablet twice daily; Therapy: 20YUL6867 to (Meghann Morillo)  Requested for: 58FUA7164; Last   Rx:24Nov2017 Ordered   2  Calcium TABS; Therapy: (Recorded:13Oct2014) to Recorded   3  Fish Oil CAPS; Therapy: (Recorded:13Oct2014) to Recorded   4  Iron TABS; Therapy: (Recorded:13Oct2014) to Recorded   5  LORazepam 0 5 MG Oral Tablet; TAKE 1 TABLET Once PRN dental work; Therapy: 24IVM5345 to (Evaluate:05Flt7843); Last Rx:08Nov2017 Ordered   6  Multi-Vitamin TABS; TAKE 1 TABLET DAILY; Therapy: (Recorded:13Oct2014) to Recorded    Allergies    1   No Known Drug Allergies    Signatures   Electronically signed by : Conor Benjamin, ; Jan 12 2018 12:57PM EST                       (Author)

## 2018-02-26 NOTE — RESULT NOTES
Verified Results  (1) TISSUE EXAM [id number] 01:05PM Dorothea Villarreal     Test Name Result Flag Reference   LAB AP CASE REPORT (Report)     Surgical Pathology Report             Case: [id number]                   Authorizing Provider: Ovidio Paulino MD     Collected:      01/08/2018           Pathologist:      Linnea Newby MD    Received:      01/09/2018 1440        Specimen:  Endometrium   LAB AP FINAL DIAGNOSIS (Report)     A  Endometrium, biopsy:        - Superficial fragments of benign endometrial glands with focal   tubal metaplasia, and the majority are without underlying stroma,   consistent with atrophy         - No hyperplasia or malignancy is identified  Interpretation performed at , [address]   Electronically signed by Linnea Newby MD on 1/10/2018 at 1:05 PM   LAB AP SURGICAL ADDITIONAL INFORMATION (Report)     All controls performed with the immunohistochemical stains reported above   reacted appropriately  These tests were developed and their performance   characteristics determined by [facility] or   [facility]  They may not be cleared or approved by the [place]  Food and Drug Administration  The FDA has determined that such clearance   or approval is not necessary  These tests are used for clinical purposes  They should not be regarded as investigational or for research  This   laboratory has been approved by CLIA 88, designated as a high-complexity   laboratory and is qualified to perform these tests  LAB AP GROSS DESCRIPTION (Report)     A  The specimen is received in formalin, labeled with the patient's name   and hospital number, and is designated endometrial biopsy  The specimen   consists of multiple tan-white, focally mucinous, focally friable soft   tissue fragments measuring in aggregate 1 0 x 0 3 by less than 0 1 cm in   greatest dimension  The specimen is entirely submitted in 1 cassette      Note: The estimated total formalin fixation time based upon information   provided by the submitting clinician and the standard processing schedule   is under 72 hours       Ricardo Kid

## 2018-05-23 ENCOUNTER — TELEPHONE (OUTPATIENT)
Dept: FAMILY MEDICINE CLINIC | Facility: CLINIC | Age: 53
End: 2018-05-23

## 2018-05-23 ENCOUNTER — OFFICE VISIT (OUTPATIENT)
Dept: FAMILY MEDICINE CLINIC | Facility: CLINIC | Age: 53
End: 2018-05-23
Payer: COMMERCIAL

## 2018-05-23 VITALS
BODY MASS INDEX: 25.65 KG/M2 | SYSTOLIC BLOOD PRESSURE: 106 MMHG | WEIGHT: 159.6 LBS | OXYGEN SATURATION: 97 % | HEIGHT: 66 IN | TEMPERATURE: 99.5 F | RESPIRATION RATE: 16 BRPM | HEART RATE: 104 BPM | DIASTOLIC BLOOD PRESSURE: 62 MMHG

## 2018-05-23 DIAGNOSIS — H10.31 ACUTE CONJUNCTIVITIS OF RIGHT EYE, UNSPECIFIED ACUTE CONJUNCTIVITIS TYPE: Primary | ICD-10-CM

## 2018-05-23 DIAGNOSIS — R09.89 SINUS COMPLAINT: ICD-10-CM

## 2018-05-23 PROBLEM — F41.8 SITUATIONAL ANXIETY: Status: ACTIVE | Noted: 2017-11-08

## 2018-05-23 PROBLEM — R53.83 FATIGUE: Status: ACTIVE | Noted: 2017-11-08

## 2018-05-23 PROBLEM — N95.0 POST-MENOPAUSAL BLEEDING: Status: ACTIVE | Noted: 2018-01-08

## 2018-05-23 PROBLEM — M25.511 ARTHRALGIA OF RIGHT ACROMIOCLAVICULAR JOINT: Status: ACTIVE | Noted: 2017-07-24

## 2018-05-23 PROCEDURE — 99213 OFFICE O/P EST LOW 20 MIN: CPT | Performed by: FAMILY MEDICINE

## 2018-05-23 RX ORDER — LORAZEPAM 0.5 MG/1
1 TABLET ORAL AS NEEDED
COMMUNITY
Start: 2017-11-08 | End: 2018-10-30 | Stop reason: SDUPTHER

## 2018-05-23 RX ORDER — MULTIVITAMIN
1 TABLET ORAL DAILY
COMMUNITY

## 2018-05-23 RX ORDER — TOBRAMYCIN AND DEXAMETHASONE 3; 1 MG/ML; MG/ML
1 SUSPENSION/ DROPS OPHTHALMIC
Qty: 5 ML | Refills: 1 | Status: SHIPPED | OUTPATIENT
Start: 2018-05-23 | End: 2022-02-03

## 2018-05-23 RX ORDER — IRON,CARBONYL/ASCORBIC ACID 100-250 MG
TABLET ORAL WEEKLY
COMMUNITY

## 2018-05-23 RX ORDER — AMOXICILLIN 875 MG/1
875 TABLET, COATED ORAL 2 TIMES DAILY
Qty: 14 TABLET | Refills: 0 | Status: SHIPPED | OUTPATIENT
Start: 2018-05-23 | End: 2018-05-30

## 2018-05-23 NOTE — TELEPHONE ENCOUNTER
Pt left a message on mailbox  The pharmacy did not have her drops so she had them transfer the script to [facility] pharmacy in [place]  She also wanted her amoxicillin called in to [facility] pharmacy it [facility]  Canceled Rx at [facility] and called in to [facility],[address]  Pt aware, no further action needed   Janet Sullivan

## 2018-05-23 NOTE — PATIENT INSTRUCTIONS
Gentle rinse 3 times daily  Topical tx for 5 days      call if worsens    Keep head elevated as much as possible

## 2018-05-23 NOTE — PROGRESS NOTES
Subjective:           Problem List Items Addressed This Visit     None      Visit Diagnoses     Acute conjunctivitis of right eye, unspecified acute conjunctivitis type    -  Primary    Relevant Medications    tobramycin-dexamethasone (TOBRADEX) ophthalmic suspension    amoxicillin (AMOXIL) 875 mg tablet    Sinus complaint        Relevant Medications    amoxicillin (AMOXIL) 875 mg tablet              No orders of the defined types were placed in this encounter  Patient Instructions   Gentle rinse 3 times daily  Topical tx for 5 days      call if worsens  Keep head elevated as much as possible      Mihir Escamilla      HPI crusted swollen R eye swelling    Vitals:    05/23/18 1328   BP: 106/62   Pulse: 104   Resp: 16   Temp: 99 5 °F (37 5 °C)   SpO2: 97%       No Known Allergies    No current outpatient prescriptions on file prior to visit  No current facility-administered medications on file prior to visit  Past Medical History:   Diagnosis Date    Lyme disease        No past surgical history on file  reports that she has never smoked  She has never used smokeless tobacco  She reports that she does not drink alcohol or use drugs  reports that she has never smoked  She has never used smokeless tobacco     The following portions of the patient's history were reviewed and updated as appropriate: allergies, current medications, past family history, past medical history, past social history, past surgical history and problem list     Review of Systems   Constitutional: Positive for fever  Negative for chills  HENT: Negative for congestion, nosebleeds and sinus pressure  Eyes: Positive for discharge and redness  R   Cardiovascular: Negative for chest pain  Gastrointestinal: Negative for abdominal pain  Genitourinary: Negative for hematuria  Musculoskeletal: Negative for back pain  Neurological: Negative for dizziness, syncope, facial asymmetry and light-headedness  Psychiatric/Behavioral: Negative for behavioral problems and dysphoric mood  Physical Exam   Constitutional: She is oriented to person, place, and time  She appears well-developed and well-nourished  HENT:   Right Ear: External ear normal    Left Ear: External ear normal    Nose: Nose normal    Eyes: Right eye exhibits discharge  No scleral icterus  R conj injected edema upper   Neck: No thyromegaly present  Cardiovascular: Normal rate  Pulmonary/Chest: Effort normal and breath sounds normal  She has no wheezes  Abdominal: There is no tenderness  Neurological: She is alert and oriented to person, place, and time  Skin: Skin is warm and dry  Psychiatric: She has a normal mood and affect

## 2018-05-23 NOTE — TELEPHONE ENCOUNTER
I called [facility] back regarding Rx  They misunderstood what I said in the message I left they thought the prescription was for 10 days but it is for 7 days  No further action needed   Coleen Hatchet, [place]

## 2018-05-23 NOTE — LETTER
May 23, 2018     Patient: Nicole Matt   YOB: 1965   Date of Visit: 5/23/2018       To Whom it May Concern:    Liliane South is under my professional care  She was seen in my office on 5/23/2018  She may return to work on 5/25/18  If you have any questions or concerns, please don't hesitate to call           Sincerely,          Monica Gavin MD        CC: No Recipients

## 2018-06-14 ENCOUNTER — APPOINTMENT (OUTPATIENT)
Dept: LAB | Facility: HOSPITAL | Age: 53
End: 2018-06-14
Payer: COMMERCIAL

## 2018-06-14 ENCOUNTER — TRANSCRIBE ORDERS (OUTPATIENT)
Dept: LAB | Facility: HOSPITAL | Age: 53
End: 2018-06-14

## 2018-06-14 DIAGNOSIS — Z00.8 HEALTH EXAMINATION IN POPULATION SURVEY: Primary | ICD-10-CM

## 2018-06-14 DIAGNOSIS — Z00.8 HEALTH EXAMINATION IN POPULATION SURVEY: ICD-10-CM

## 2018-06-14 LAB
CHOLEST SERPL-MCNC: 209 MG/DL (ref 50–200)
EST. AVERAGE GLUCOSE BLD GHB EST-MCNC: 117 MG/DL
HBA1C MFR BLD: 5.7 % (ref 4.2–6.3)
HDLC SERPL-MCNC: 57 MG/DL (ref 40–60)
LDLC SERPL CALC-MCNC: 125 MG/DL (ref 0–100)
NONHDLC SERPL-MCNC: 152 MG/DL
TRIGL SERPL-MCNC: 134 MG/DL

## 2018-06-14 PROCEDURE — 80061 LIPID PANEL: CPT

## 2018-06-14 PROCEDURE — 83036 HEMOGLOBIN GLYCOSYLATED A1C: CPT

## 2018-08-03 ENCOUNTER — LAB (OUTPATIENT)
Dept: LAB | Facility: HOSPITAL | Age: 53
End: 2018-08-03
Payer: COMMERCIAL

## 2018-08-03 ENCOUNTER — TELEPHONE (OUTPATIENT)
Dept: FAMILY MEDICINE CLINIC | Facility: CLINIC | Age: 53
End: 2018-08-03

## 2018-08-03 ENCOUNTER — TRANSCRIBE ORDERS (OUTPATIENT)
Dept: LAB | Facility: HOSPITAL | Age: 53
End: 2018-08-03
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
## 2018-08-03 DIAGNOSIS — R11.0 NAUSEA: ICD-10-CM

## 2018-08-03 DIAGNOSIS — R11.0 NAUSEA: Primary | ICD-10-CM

## 2018-08-03 LAB
BASOPHILS # BLD AUTO: 0.04 THOUSANDS/ΜL (ref 0–0.1)
BASOPHILS NFR BLD AUTO: 1 % (ref 0–1)
EOSINOPHIL # BLD AUTO: 0.06 THOUSAND/ΜL (ref 0–0.61)
EOSINOPHIL NFR BLD AUTO: 1 % (ref 0–6)
ERYTHROCYTE [DISTWIDTH] IN BLOOD BY AUTOMATED COUNT: 13.3 % (ref 11.6–15.1)
HCT VFR BLD AUTO: 36.1 % (ref 34.8–46.1)
HGB BLD-MCNC: 11.7 G/DL (ref 11.5–15.4)
IMM GRANULOCYTES # BLD AUTO: 0.02 THOUSAND/UL (ref 0–0.2)
IMM GRANULOCYTES NFR BLD AUTO: 0 % (ref 0–2)
LYMPHOCYTES # BLD AUTO: 2 THOUSANDS/ΜL (ref 0.6–4.47)
LYMPHOCYTES NFR BLD AUTO: 36 % (ref 14–44)
MCH RBC QN AUTO: 30.1 PG (ref 26.8–34.3)
MCHC RBC AUTO-ENTMCNC: 32.4 G/DL (ref 31.4–37.4)
MCV RBC AUTO: 93 FL (ref 82–98)
MONOCYTES # BLD AUTO: 0.5 THOUSAND/ΜL (ref 0.17–1.22)
MONOCYTES NFR BLD AUTO: 9 % (ref 4–12)
NEUTROPHILS # BLD AUTO: 2.96 THOUSANDS/ΜL (ref 1.85–7.62)
NEUTS SEG NFR BLD AUTO: 53 % (ref 43–75)
NRBC BLD AUTO-RTO: 0 /100 WBCS
PLATELET # BLD AUTO: 284 THOUSANDS/UL (ref 149–390)
PMV BLD AUTO: 9.8 FL (ref 8.9–12.7)
RBC # BLD AUTO: 3.89 MILLION/UL (ref 3.81–5.12)
WBC # BLD AUTO: 5.58 THOUSAND/UL (ref 4.31–10.16)

## 2018-08-03 PROCEDURE — 85025 COMPLETE CBC W/AUTO DIFF WBC: CPT

## 2018-08-03 PROCEDURE — 36415 COLL VENOUS BLD VENIPUNCTURE: CPT

## 2018-08-03 PROCEDURE — 86617 LYME DISEASE ANTIBODY: CPT

## 2018-08-03 NOTE — TELEPHONE ENCOUNTER
Pt called c/o not feeling well and feeling like she is salivating more than usual  Last night she found a sea tick on her  Pt would like to have a lyme test done at [facility], where she works  I explained, per Amos Jacques, that it may be too early to test for Lyme and that if it came back negative we could do another test in six weeks  Pt understands

## 2018-08-04 LAB
B BURGDOR IGG PATRN SER IB-IMP: NEGATIVE
B BURGDOR IGM PATRN SER IB-IMP: NEGATIVE
B BURGDOR18KD IGG SER QL IB: NORMAL
B BURGDOR23KD IGG SER QL IB: NORMAL
B BURGDOR23KD IGM SER QL IB: NORMAL
B BURGDOR28KD IGG SER QL IB: NORMAL
B BURGDOR30KD IGG SER QL IB: NORMAL
B BURGDOR39KD IGG SER QL IB: NORMAL
B BURGDOR39KD IGM SER QL IB: NORMAL
B BURGDOR41KD IGG SER QL IB: NORMAL
B BURGDOR41KD IGM SER QL IB: NORMAL
B BURGDOR45KD IGG SER QL IB: NORMAL
B BURGDOR58KD IGG SER QL IB: NORMAL
B BURGDOR66KD IGG SER QL IB: NORMAL
B BURGDOR93KD IGG SER QL IB: NORMAL

## 2018-08-06 ENCOUNTER — TELEPHONE (OUTPATIENT)
Dept: FAMILY MEDICINE CLINIC | Facility: CLINIC | Age: 53
End: 2018-08-06

## 2018-08-06 DIAGNOSIS — M79.10 MYALGIA: Primary | ICD-10-CM

## 2018-08-06 NOTE — TELEPHONE ENCOUNTER
Pt  Called  Requesting a lyme test for  6 weeks from now  Pt  Would like script mailed  To her  can you please order? af/phylicia

## 2018-08-22 ENCOUNTER — TELEPHONE (OUTPATIENT)
Dept: OBGYN CLINIC | Facility: CLINIC | Age: 53
End: 2018-08-22

## 2018-08-22 ENCOUNTER — TELEPHONE (OUTPATIENT)
Dept: FAMILY MEDICINE CLINIC | Facility: CLINIC | Age: 53
End: 2018-08-22

## 2018-08-22 DIAGNOSIS — N92.4 ABNORMAL PERIMENOPAUSAL BLEEDING: Primary | ICD-10-CM

## 2018-08-22 DIAGNOSIS — R07.9 CHEST PAIN, UNSPECIFIED TYPE: Primary | ICD-10-CM

## 2018-08-22 NOTE — TELEPHONE ENCOUNTER
Pt had a neg EB in Jan ff  bleeding  Pt began light bleeding all day yesteday - and again today  What to do? U/S? Another EB?   Thanks

## 2018-08-22 NOTE — TELEPHONE ENCOUNTER
Pt left a message that yesterday she had chest pain with right sided jaw pain which went away and leter in the day she had upper back pain which eventually went away as well  Pt is currently in contact with her ob-gyn for post menopausal bleeding  Do you want to see the pt? Order tests? ED with not current symptoms? Please advise

## 2018-08-22 NOTE — TELEPHONE ENCOUNTER
Her age it 47 is still consistent with bleeding at times for some people  The tissue report from her D and C in January showed atrophic endometrium  Order a pelvic ultrasound for postmenopausal bleeding

## 2018-08-22 NOTE — TELEPHONE ENCOUNTER
Patient called she is having post menopausal bleeding, patient had this before and had a biopsy in late January or early February  Elli Dhillon told her to call if she has bleeding   Patient work phone is [phone] patient said it may be easier to reach her at this number

## 2018-08-23 ENCOUNTER — OFFICE VISIT (OUTPATIENT)
Dept: CARDIOLOGY CLINIC | Facility: CLINIC | Age: 53
End: 2018-08-23
Payer: COMMERCIAL

## 2018-08-23 VITALS
WEIGHT: 162.9 LBS | HEIGHT: 67 IN | SYSTOLIC BLOOD PRESSURE: 120 MMHG | OXYGEN SATURATION: 97 % | DIASTOLIC BLOOD PRESSURE: 68 MMHG | BODY MASS INDEX: 25.57 KG/M2 | HEART RATE: 84 BPM

## 2018-08-23 DIAGNOSIS — R07.89 ATYPICAL CHEST PAIN: Primary | ICD-10-CM

## 2018-08-23 DIAGNOSIS — E78.5 DYSLIPIDEMIA: ICD-10-CM

## 2018-08-23 DIAGNOSIS — E66.3 OVERWEIGHT (BMI 25.0-29.9): ICD-10-CM

## 2018-08-23 DIAGNOSIS — Z86.69 HISTORY OF MIGRAINE HEADACHES: ICD-10-CM

## 2018-08-23 DIAGNOSIS — G25.81 RESTLESS LEGS SYNDROME: ICD-10-CM

## 2018-08-23 DIAGNOSIS — G47.33 OBSTRUCTIVE SLEEP APNEA: ICD-10-CM

## 2018-08-23 DIAGNOSIS — R07.9 CHEST PAIN, UNSPECIFIED TYPE: ICD-10-CM

## 2018-08-23 DIAGNOSIS — E04.2 MULTIPLE THYROID NODULES: ICD-10-CM

## 2018-08-23 DIAGNOSIS — K76.0 HEPATIC STEATOSIS: ICD-10-CM

## 2018-08-23 PROCEDURE — 99244 OFF/OP CNSLTJ NEW/EST MOD 40: CPT | Performed by: INTERNAL MEDICINE

## 2018-08-23 PROCEDURE — 93000 ELECTROCARDIOGRAM COMPLETE: CPT | Performed by: INTERNAL MEDICINE

## 2018-08-23 NOTE — LETTER
August 23, 2018     Abhi Albarran, [address] [address]    Patient: Suresh Mon   YOB: 1965   Date of Visit: 8/23/2018       Dear Dr Thao Shea:    Thank you for referring Zan Soto to me for evaluation  Below are my notes for this consultation  If you have questions, please do not hesitate to call me  I look forward to following your patient along with you  Sincerely,        Tino Horta MD        CC: No Recipients  Tino Horta MD  8/23/2018 12:57 PM  Signed  Consultation - Cardiology   Velasquez Escamilla 47 y o  female MRN: [id number]  Unit/Bed#:  Encounter: [id number]        Assessment/Plan     Assessment:    1  Atypical chest pain, unlikely to represent CAD, but there are a number of CAD risk factors including age, family history, dyslipidemia, and overweight  2   Moderate risk dyslipidemia with 1 5 % calculated 10 year ASCVD risk and elevated 39 % lifetime ASCVD risk  3   Chronic overweight, mild  4  History of thyroid nodules  5  Hepatic steatosis  6  Self diagnosed migraine headaches  7  History of Lyme disease  8  History of mild obstructive sleep apnea diagnosed on sleep study by Dr Silvia Strong approximately 5 years ago  9   History of restless legs syndrome    Plan:    1  Suggested to patient that she have treadmill stress test performed at [facility], where she works  She was given a requisition for this  2   To help better risk stratify her for use of statins in the future, I recommended she have hs-CRP and CT for coronary artery calcium scoring, which she will arrange  Again, she received requisitions for these tests  3   We will contact her with results of her studies and with further recommendations    4   A copy of these notes was sent to Dr Thao Shea         History of Present Illness      Physician Requesting Consult:   Dr Thao Shea    Reason for Consult / Principal Problem:  Chest pain    HPI: Suresh Mon is a 47 y o  year old female who presents with sudden onset of substernal sharp and stabbing chest pain while standing at a computer at work on 08/22/2018 in the morning, severe for about 1 minutes and gradually resolving over several minutes  There was no radiation or associated symptoms  No vital signs were taken  In the afternoon yesterday, the patient suddenly experienced aching of the right mandibular region for several minutes, again at rest, with resolution in several minutes  Finally, she experienced right scapular aching for several minutes while driving home  She normally swims or walks 1/2 to 1 mile 2 or 3 days a week and actually went for a swim for the 20-30 minutes last night with no symptoms  She has had no symptoms today  She has no exercise induced chest discomfort  She reports a history of rapid heart rate with a flu-like illness documented at an urgent care center when her pulse rate was recorded at 122 bpm on 03/11/2017  That is not a problem at the present time  There is no history of dyspnea, orthopnea, paroxysmal nocturnal  dyspnea, palpitations, edema, syncope, dizziness, cough, sputum production, wheezing, fever, chills  Historical Information   Past Medical History:   Diagnosis Date    Lyme disease    Dyslipidemia  Overweight  Thyroid nodules  Hepatic steatosis  Self-diagnosed migraine headaches  Mild obstructive sleep apnea  Restless legs syndrome    History reviewed  Pertinent surgical history:    Uterine cystectomy at age 21  D&C  [place] teeth extraction    History   Alcohol Use    Yes     Comment: occasional     History   Drug Use No     History   Smoking Status    Never Smoker   Smokeless Tobacco    Never Used    Patient is  from her  for 4 years and lives home with 77-year-old son  She has no immediate family near by but has friends locally  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    She admits to drinking 4 cups of coffee per day and 1 or 2 beers or wine glasses on weekends and denies any illicit drugs  Family History   Problem Relation Age of Onset    Atrial fibrillation Mother     Hyperlipidemia Mother     Heart disease with history of CABG at age in late 62s Father    [place] Hypertension Father     Hyperlipidemia Father     Diabetes Father      Family history positive for CAD in father at late 62s, when he had CABG, positive for diabetes mellitus, atrial fibrillation, congestive heart failure and maternal grandmother    Meds/Allergies   Prior to Admission medications    Medication Sig Start Date End Date Taking?  Authorizing Provider   Calcium Carb-Cholecalciferol (CALCIUM 1000 + D) 1000-800 MG-UNIT TABS Take by mouth daily     Yes Historical Provider, MD   Iron-Vitamin C (IRON 100/C) 100-250 MG TABS Take by mouth once a week     Yes Historical Provider, MD   LORazepam (ATIVAN) 0 5 mg tablet Take 1 tablet by mouth as needed Take as need for dental work 11/8/17  Yes Historical Provider, MD   Multiple Vitamin (MULTI-VITAMIN DAILY) TABS Take 1 tablet by mouth daily   Yes Historical Provider, MD   Omega-3 Fatty Acids (FISH OIL) 645 MG CAPS Take by mouth once a week     Yes Historical Provider, MD   tobramycin-dexamethasone (TOBRADEX) ophthalmic suspension Administer 1 drop to the right eye every 4 (four) hours while awake 5/23/18  Yes Yair Del Cid MD     Current Outpatient Prescriptions   Medication Sig Dispense Refill    Calcium Carb-Cholecalciferol (CALCIUM 1000 + D) 1000-800 MG-UNIT TABS Take by mouth daily        Iron-Vitamin C (IRON 100/C) 100-250 MG TABS Take by mouth once a week        LORazepam (ATIVAN) 0 5 mg tablet Take 1 tablet by mouth as needed Take as need for dental work      Multiple Vitamin (MULTI-VITAMIN DAILY) TABS Take 1 tablet by mouth daily      Omega-3 Fatty Acids (FISH OIL) 645 MG CAPS Take by mouth once a week        tobramycin-dexamethasone (TOBRADEX) ophthalmic suspension Administer 1 drop to the right eye every 4 (four) hours while awake 5 mL 1     No current facility-administered medications for this visit  No Known Allergies    Cardiovascular ROS:       More than 10 systems reviewed and all systems negative, except as noted in history of present illness    Objective   Vitals: Blood pressure 120/68, pulse 84, height 5' 6 5" (1 689 m), weight 73 9 kg (162 lb 14 4 oz), last menstrual period 06/22/2016, SpO2 97 %  Body mass index is 25 9 kg/m²  Physical Exam  General -well-developed mildly overweight  female  in no acute distress  Patient is alert, oriented X3 and cooperative  Skin-warm and dry with no pallor, rashes, ecchymoses, lesions  HEENT-normocephalic  Pupils equally round and reactive to light and accommodation  Extraocular muscles intact  Mucous membranes pink and moist  Sclerae nonicteric  Optic fundi poorly seen  Neck-no jugular venous distention, AJR, masses, thyromegaly, adenopathy, bruits  Lungs-no rales, rhonchi, wheezes  Heart-no murmur, gallop, rub, click, heave, thrill  Abdomen-normal bowel sounds with no masses, organomegaly, guarding, tenderness, or rigidity  Extremities-no cyanosis, clubbing, edema, pulse decrease  Neurological-no focal neurological signs  Imaging:     EKG 08/23/2018:     RV conduction delay  Low QRS voltage  Otherwise normal EKG    Imaging  Chest X-Ray:  No Chest XR results available for this patient            Lab Review     Lab Results   Component Value Date     06/14/2018    K 3 8 06/14/2018     06/14/2018    CO2 30 06/14/2018    ANIONGAP 6 06/14/2018    BUN 15 06/14/2018    CREATININE 0 69 06/14/2018    GLUCOSE 91 04/24/2014    GLUF 74 06/14/2018    CALCIUM 8 9 06/14/2018    AST 15 06/14/2018    ALT 26 06/14/2018    ALKPHOS 65 06/14/2018    PROT 7 9 06/14/2018    BILITOT 0 45 06/14/2018    EGFR 100 06/14/2018       Lab Results   Component Value Date    CHOL 204 07/31/2015    CHOL 182 06/19/2014     Lab Results   Component Value Date    HDL 57 06/14/2018    HDL 63 (H) 07/13/2017    HDL 59 07/21/2016     Lab Results   Component Value Date    LDLCALC 125 (H) 06/14/2018    LDLCALC 134 (H) 07/13/2017    LDLCALC 137 (H) 07/21/2016     Lab Results   Component Value Date    TRIG 134 06/14/2018    TRIG 55 07/13/2017    TRIG 99 07/21/2016      Cholesterol on 06/14/    Lab Results   Component Value Date    GLUCOSE 91 04/24/2014    CALCIUM 8 9 06/14/2018     06/14/2018    K 3 8 06/14/2018    CO2 30 06/14/2018     06/14/2018    BUN 15 06/14/2018    CREATININE 0 69 06/14/2018       Counseling / Coordination of Care  Total  time spent today 64 minutes       Danielle Rowland MD

## 2018-08-23 NOTE — PROGRESS NOTES
Consultation - Cardiology   Jessica Escamilla 47 y o  female MRN: [id number]  Unit/Bed#:  Encounter: [id number]        Assessment/Plan     Assessment:    1  Atypical chest pain, unlikely to represent CAD, but there are a number of CAD risk factors including age, family history, dyslipidemia, and overweight  2   Moderate risk dyslipidemia with 1 5 % calculated 10 year ASCVD risk and elevated 39 % lifetime ASCVD risk  3   Chronic overweight, mild  4  History of thyroid nodules  5  Hepatic steatosis  6  Self diagnosed migraine headaches  7  History of Lyme disease  8  History of mild obstructive sleep apnea diagnosed on sleep study by Dr Mckenna Cates approximately 5 years ago  9   History of restless legs syndrome    Plan:    1  Suggested to patient that she have treadmill stress test performed at [place] [address], where she works  She was given a requisition for this  2   To help better risk stratify her for use of statins in the future, I recommended she have hs-CRP and CT for coronary artery calcium scoring, which she will arrange  Again, she received requisitions for these tests  3   We will contact her with results of her studies and with further recommendations  4   A copy of these notes was sent to Dr Amrit Jean         History of Present Illness      Physician Requesting Consult:   Dr Amrit Jean    Reason for Consult / Principal Problem:  Chest pain    HPI: Juanito Emmanuel is a 47y o  year old female who presents with sudden onset of substernal sharp and stabbing chest pain while standing at a computer at work on 08/22/2018 in the morning, severe for about 1 minutes and gradually resolving over several minutes  There was no radiation or associated symptoms  No vital signs were taken  In the afternoon yesterday, the patient suddenly experienced aching of the right mandibular region for several minutes, again at rest, with resolution in several minutes      Finally, she experienced right scapular aching for several minutes while driving home  She normally swims or walks 1/2 to 1 mile 2 or 3 days a week and actually went for a swim for the 20-30 minutes last night with no symptoms  She has had no symptoms today  She has no exercise induced chest discomfort  She reports a history of rapid heart rate with a flu-like illness documented at an urgent care center when her pulse rate was recorded at 122 bpm on 03/11/2017  That is not a problem at the present time  There is no history of dyspnea, orthopnea, paroxysmal nocturnal  dyspnea, palpitations, edema, syncope, dizziness, cough, sputum production, wheezing, fever, chills  Historical Information   Past Medical History:   Diagnosis Date    Lyme disease    Dyslipidemia  Overweight  Thyroid nodules  Hepatic steatosis  Self-diagnosed migraine headaches  Mild obstructive sleep apnea  Restless legs syndrome    History reviewed  Pertinent surgical history:    Uterine cystectomy at age 21  D&C  [place] teeth extraction    History   Alcohol Use    Yes     Comment: occasional     History   Drug Use No     History   Smoking Status    Never Smoker   Smokeless Tobacco    Never Used    Patient is  from her  for 4 years and lives home with 26-year-old son  She has no immediate family near by but has friends locally  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  She admits to drinking 4 cups of coffee per day and 1 or 2 beers or wine glasses on weekends and denies any illicit drugs      Family History   Problem Relation Age of Onset    Atrial fibrillation Mother     Hyperlipidemia Mother     Heart disease with history of CABG at age in late 62s Father    [place] Hypertension Father     Hyperlipidemia Father     Diabetes Father      Family history positive for CAD in father at late 62s, when he had CABG, positive for diabetes mellitus, atrial fibrillation, congestive heart failure and maternal grandmother    Meds/Allergies   Prior to Admission medications    Medication Sig Start Date End Date Taking? Authorizing Provider   Calcium Carb-Cholecalciferol (CALCIUM 1000 + D) 1000-800 MG-UNIT TABS Take by mouth daily     Yes Historical Provider, MD   Iron-Vitamin C (IRON 100/C) 100-250 MG TABS Take by mouth once a week     Yes Historical Provider, MD   LORazepam (ATIVAN) 0 5 mg tablet Take 1 tablet by mouth as needed Take as need for dental work 11/8/17  Yes Historical Provider, MD   Multiple Vitamin (MULTI-VITAMIN DAILY) TABS Take 1 tablet by mouth daily   Yes Historical Provider, MD   Omega-3 Fatty Acids (FISH OIL) 645 MG CAPS Take by mouth once a week     Yes Historical Provider, MD   tobramycin-dexamethasone (TOBRADEX) ophthalmic suspension Administer 1 drop to the right eye every 4 (four) hours while awake 5/23/18  Yes Celine Parsons MD     Current Outpatient Prescriptions   Medication Sig Dispense Refill    Calcium Carb-Cholecalciferol (CALCIUM 1000 + D) 1000-800 MG-UNIT TABS Take by mouth daily        Iron-Vitamin C (IRON 100/C) 100-250 MG TABS Take by mouth once a week        LORazepam (ATIVAN) 0 5 mg tablet Take 1 tablet by mouth as needed Take as need for dental work      Multiple Vitamin (MULTI-VITAMIN DAILY) TABS Take 1 tablet by mouth daily      Omega-3 Fatty Acids (FISH OIL) 645 MG CAPS Take by mouth once a week        tobramycin-dexamethasone (TOBRADEX) ophthalmic suspension Administer 1 drop to the right eye every 4 (four) hours while awake 5 mL 1     No current facility-administered medications for this visit  No Known Allergies    Cardiovascular ROS:       More than 10 systems reviewed and all systems negative, except as noted in history of present illness    Objective   Vitals: Blood pressure 120/68, pulse 84, height 5' 6 5" (1 689 m), weight 73 9 kg (162 lb 14 4 oz), last menstrual period 06/22/2016, SpO2 97 %  Body mass index is 25 9 kg/m²      Physical Exam  General -well-developed mildly overweight  female  in no acute distress  Patient is alert, oriented X3 and cooperative  Skin-warm and dry with no pallor, rashes, ecchymoses, lesions  HEENT-normocephalic  Pupils equally round and reactive to light and accommodation  Extraocular muscles intact  Mucous membranes pink and moist  Sclerae nonicteric  Optic fundi poorly seen  Neck-no jugular venous distention, AJR, masses, thyromegaly, adenopathy, bruits  Lungs-no rales, rhonchi, wheezes  Heart-no murmur, gallop, rub, click, heave, thrill  Abdomen-normal bowel sounds with no masses, organomegaly, guarding, tenderness, or rigidity  Extremities-no cyanosis, clubbing, edema, pulse decrease  Neurological-no focal neurological signs  Imaging:     EKG 08/23/2018:     RV conduction delay  Low QRS voltage  Otherwise normal EKG    Imaging  Chest X-Ray:  No Chest XR results available for this patient            Lab Review     Lab Results   Component Value Date     06/14/2018    K 3 8 06/14/2018     06/14/2018    CO2 30 06/14/2018    ANIONGAP 6 06/14/2018    BUN 15 06/14/2018    CREATININE 0 69 06/14/2018    GLUCOSE 91 04/24/2014    GLUF 74 06/14/2018    CALCIUM 8 9 06/14/2018    AST 15 06/14/2018    ALT 26 06/14/2018    ALKPHOS 65 06/14/2018    PROT 7 9 06/14/2018    BILITOT 0 45 06/14/2018    EGFR 100 06/14/2018       Lab Results   Component Value Date    CHOL 204 07/31/2015    CHOL 182 06/19/2014     Lab Results   Component Value Date    HDL 57 06/14/2018    HDL 63 (H) 07/13/2017    HDL 59 07/21/2016     Lab Results   Component Value Date    LDLCALC 125 (H) 06/14/2018    LDLCALC 134 (H) 07/13/2017    LDLCALC 137 (H) 07/21/2016     Lab Results   Component Value Date    TRIG 134 06/14/2018    TRIG 55 07/13/2017    TRIG 99 07/21/2016      Cholesterol on 06/14/    Lab Results   Component Value Date    GLUCOSE 91 04/24/2014    CALCIUM 8 9 06/14/2018     06/14/2018    K 3 8 06/14/2018    CO2 30 06/14/2018     06/14/2018    BUN 15 06/14/2018    CREATININE 0 69 06/14/2018       Counseling / Coordination of Care  Total  time spent today 64 minutes       Slade Jha MD

## 2018-08-23 NOTE — PATIENT INSTRUCTIONS
1   Suggested to patient that she have treadmill stress test performed at [facility], where she works  2   To help better risk stratify her for use of statins in the future, I recommended she have hs-CRP and CT for coronary artery calcium scoring, which she will arrange  3   We will contact her with results of her studies and with further recommendations    4   A copy of these nodes was sent to Dr Albania Martinez

## 2018-08-27 ENCOUNTER — HOSPITAL ENCOUNTER (OUTPATIENT)
Dept: RADIOLOGY | Facility: HOSPITAL | Age: 53
Discharge: HOME | End: 2018-08-27
Attending: OBSTETRICS & GYNECOLOGY
Payer: COMMERCIAL

## 2018-08-27 DIAGNOSIS — N92.4 ABNORMAL PERIMENOPAUSAL BLEEDING: ICD-10-CM

## 2018-08-27 PROCEDURE — 76830 TRANSVAGINAL US NON-OB: CPT

## 2018-08-27 PROCEDURE — 76856 US EXAM PELVIC COMPLETE: CPT

## 2018-08-30 ENCOUNTER — TELEPHONE (OUTPATIENT)
Dept: OBGYN CLINIC | Facility: CLINIC | Age: 53
End: 2018-08-30

## 2018-08-30 NOTE — TELEPHONE ENCOUNTER
Pt had an ultrasound on Monday and since then having some discomfort, and itching, pain  Not sure is gel is giving reaction or yeast infection is coming  But would like to know what to do   ob

## 2018-08-30 NOTE — TELEPHONE ENCOUNTER
----- Message from Michael Morales MD sent at 8/30/2018 11:30 AM EDT -----  Please call the patient regarding her pelvic ultrasound  Her ultrasound showed no abnormalities with her ovaries  The lining of her uterus was 2 mm thick which is consistent with normal menopause  Her uterus showed no evidence of fibroids  There was a small cyst in the uterine muscle which probably has no association with bleeding  How long to the recent bleeding episode continue it is it is still going on  If it has stopped call again when she sees any vaginal bleeding in the future  I do not see any need to repeat a biopsy at this point based on the ultrasound report

## 2018-08-30 NOTE — TELEPHONE ENCOUNTER
Pt had external irritation prior to U/s  Now it is worse  Will try hydrocortisone tid    RJS did not read u/s report as yet

## 2018-09-07 ENCOUNTER — APPOINTMENT (OUTPATIENT)
Dept: LAB | Facility: HOSPITAL | Age: 53
End: 2018-09-07
Payer: COMMERCIAL

## 2018-09-07 DIAGNOSIS — M79.10 MYALGIA: ICD-10-CM

## 2018-09-07 LAB — CRP SERPL HS-MCNC: 0.97 MG/L

## 2018-09-07 PROCEDURE — 86617 LYME DISEASE ANTIBODY: CPT

## 2018-09-07 PROCEDURE — 86141 C-REACTIVE PROTEIN HS: CPT | Performed by: INTERNAL MEDICINE

## 2018-09-07 PROCEDURE — 36415 COLL VENOUS BLD VENIPUNCTURE: CPT | Performed by: INTERNAL MEDICINE

## 2018-09-11 ENCOUNTER — TELEPHONE (OUTPATIENT)
Dept: CARDIOLOGY CLINIC | Facility: CLINIC | Age: 53
End: 2018-09-11

## 2018-09-11 NOTE — TELEPHONE ENCOUNTER
Spoke to PT, results given  PT stated she has been exstreamly busy and will have coronary CT scan done ASAP

## 2018-10-11 ENCOUNTER — OFFICE VISIT (OUTPATIENT)
Dept: FAMILY MEDICINE CLINIC | Facility: CLINIC | Age: 53
End: 2018-10-11
Payer: COMMERCIAL

## 2018-10-11 VITALS
SYSTOLIC BLOOD PRESSURE: 110 MMHG | DIASTOLIC BLOOD PRESSURE: 72 MMHG | WEIGHT: 165 LBS | TEMPERATURE: 100.4 F | HEIGHT: 67 IN | HEART RATE: 84 BPM | BODY MASS INDEX: 25.9 KG/M2 | RESPIRATION RATE: 18 BRPM

## 2018-10-11 DIAGNOSIS — J11.1 INFLUENZA-LIKE ILLNESS: Primary | ICD-10-CM

## 2018-10-11 DIAGNOSIS — J06.9 VIRAL UPPER RESPIRATORY TRACT INFECTION: ICD-10-CM

## 2018-10-11 PROCEDURE — 99213 OFFICE O/P EST LOW 20 MIN: CPT | Performed by: FAMILY MEDICINE

## 2018-10-11 PROCEDURE — 1036F TOBACCO NON-USER: CPT | Performed by: FAMILY MEDICINE

## 2018-10-11 RX ORDER — GUAIFENESIN AND CODEINE PHOSPHATE 100; 10 MG/5ML; MG/5ML
5 SOLUTION ORAL 3 TIMES DAILY PRN
Qty: 120 ML | Refills: 0 | Status: SHIPPED | OUTPATIENT
Start: 2018-10-11 | End: 2022-02-03

## 2018-10-11 RX ORDER — OSELTAMIVIR PHOSPHATE 75 MG/1
75 CAPSULE ORAL EVERY 12 HOURS SCHEDULED
Qty: 10 CAPSULE | Refills: 0 | Status: SHIPPED | OUTPATIENT
Start: 2018-10-11 | End: 2018-10-16

## 2018-10-11 NOTE — PROGRESS NOTES
Subjective:           Problem List Items Addressed This Visit     None      Visit Diagnoses     Influenza-like illness    -  Primary    Relevant Medications    oseltamivir (TAMIFLU) 75 mg capsule    Viral upper respiratory tract infection        Relevant Medications    oseltamivir (TAMIFLU) 75 mg capsule    guaifenesin-codeine (GUAIFENESIN AC) 100-10 MG/5ML liquid              No orders of the defined types were placed in this encounter  Patient Instructions   Hydrate well  Advil/tylenol temp > 101 or body aches,   Medications as prescribed  Diane Ramirez is in for influenza like illness fever chills body aches harsh cough no thick yellow discharge no nausea vomiting or diarrhea no recent travel  Works at [employer] temp greater than 101 yesterday low-grade today on OTC medications  Vitals:    10/11/18 1555   BP: 110/72   Pulse: 84   Resp: 18   Temp: 100 4 °F (38 °C)       No Known Allergies    Current Outpatient Prescriptions on File Prior to Visit   Medication Sig Dispense Refill    Calcium Carb-Cholecalciferol (CALCIUM 1000 + D) 1000-800 MG-UNIT TABS Take by mouth daily        Iron-Vitamin C (IRON 100/C) 100-250 MG TABS Take by mouth once a week        LORazepam (ATIVAN) 0 5 mg tablet Take 1 tablet by mouth as needed Take as need for dental work      Multiple Vitamin (MULTI-VITAMIN DAILY) TABS Take 1 tablet by mouth daily      Omega-3 Fatty Acids (FISH OIL) 645 MG CAPS Take by mouth once a week        tobramycin-dexamethasone (TOBRADEX) ophthalmic suspension Administer 1 drop to the right eye every 4 (four) hours while awake 5 mL 1     No current facility-administered medications on file prior to visit  Past Medical History:   Diagnosis Date    Lyme disease        No past surgical history on file  reports that she has never smoked  She has never used smokeless tobacco  She reports that she drinks alcohol   She reports that she does not use drugs  reports that she has never smoked  She has never used smokeless tobacco     The following portions of the patient's history were reviewed and updated as appropriate: allergies, current medications, past family history, past medical history, past social history, past surgical history and problem list     Review of Systems   Constitutional: Positive for appetite change, diaphoresis, fatigue and fever  HENT: Positive for congestion, postnasal drip and rhinorrhea  Eyes: Negative for redness  Respiratory: Positive for cough  Gastrointestinal: Positive for abdominal distention  Genitourinary: Negative for hematuria  Musculoskeletal: Positive for myalgias  Negative for gait problem and neck stiffness  Neurological: Negative for tremors and light-headedness  Hematological: Does not bruise/bleed easily  Psychiatric/Behavioral: Negative for agitation and hallucinations  Physical Exam   Constitutional: She appears well-developed and well-nourished  HENT:   turbinates inflammed   Neck: Normal range of motion  Neck supple  No JVD present  Pulmonary/Chest: Effort normal  No stridor  No respiratory distress  She has no wheezes  Rhonchi central clears with cough   Abdominal: Soft  Bowel sounds are normal    Musculoskeletal: Normal range of motion  She exhibits no tenderness  Lymphadenopathy:     She has no cervical adenopathy  Neurological: She is alert  Skin: Skin is warm  Capillary refill takes less than 2 seconds

## 2018-10-22 ENCOUNTER — HOSPITAL ENCOUNTER (OUTPATIENT)
Dept: RADIOLOGY | Facility: HOSPITAL | Age: 53
Discharge: HOME | End: 2018-10-22
Attending: INTERNAL MEDICINE
Payer: COMMERCIAL

## 2018-10-22 ENCOUNTER — TRANSCRIBE ORDERS (OUTPATIENT)
Dept: RADIOLOGY | Facility: HOSPITAL | Age: 53
End: 2018-10-22

## 2018-10-22 DIAGNOSIS — R07.89 ATYPICAL CHEST PAIN: ICD-10-CM

## 2018-10-22 DIAGNOSIS — E78.5 DYSLIPIDEMIA: ICD-10-CM

## 2018-10-23 ENCOUNTER — TELEPHONE (OUTPATIENT)
Dept: CARDIOLOGY CLINIC | Facility: CLINIC | Age: 53
End: 2018-10-23

## 2018-10-23 NOTE — TELEPHONE ENCOUNTER
----- Message from Mandy Anderson MD sent at 10/23/2018 10:44 AM EDT -----  CT coronary artery calcium score was 0, which is an excellent result and indicates an extremely low possibility of coronary artery obstruction  High sensitivity CRP was also low risk  We are still awaiting the stress test report from your upcoming stress test   So far, so good  Notify patient

## 2018-10-30 DIAGNOSIS — F41.8 SITUATIONAL ANXIETY: Primary | ICD-10-CM

## 2018-10-31 RX ORDER — LORAZEPAM 0.5 MG/1
0.5 TABLET ORAL AS NEEDED
Qty: 30 TABLET | Refills: 0 | Status: SHIPPED | OUTPATIENT
Start: 2018-10-31

## 2018-11-19 ENCOUNTER — HOSPITAL ENCOUNTER (OUTPATIENT)
Dept: NON INVASIVE DIAGNOSTICS | Facility: HOSPITAL | Age: 53
Discharge: HOME | End: 2018-11-19
Attending: INTERNAL MEDICINE
Payer: COMMERCIAL

## 2018-11-19 DIAGNOSIS — E78.5 DYSLIPIDEMIA: ICD-10-CM

## 2018-11-19 DIAGNOSIS — R07.89 ATYPICAL CHEST PAIN: ICD-10-CM

## 2018-11-19 LAB
CHEST PAIN STATEMENT: NORMAL
MAX DIASTOLIC BP: 66 MMHG
MAX HEART RATE: 190 BPM
MAX PREDICTED HEART RATE: 166 BPM
MAX. SYSTOLIC BP: 160 MMHG
PROTOCOL NAME: NORMAL
REASON FOR TERMINATION: NORMAL
TARGET HR FORMULA: NORMAL
TIME IN EXERCISE PHASE: NORMAL

## 2018-11-19 PROCEDURE — 93016 CV STRESS TEST SUPVJ ONLY: CPT | Performed by: INTERNAL MEDICINE

## 2018-11-19 PROCEDURE — 93018 CV STRESS TEST I&R ONLY: CPT | Performed by: INTERNAL MEDICINE

## 2018-11-19 PROCEDURE — 93017 CV STRESS TEST TRACING ONLY: CPT

## 2018-11-20 ENCOUNTER — TELEPHONE (OUTPATIENT)
Dept: CARDIOLOGY CLINIC | Facility: CLINIC | Age: 53
End: 2018-11-20

## 2018-11-20 NOTE — TELEPHONE ENCOUNTER
----- Message from Alejandrina Foster MD sent at 11/20/2018 12:31 PM EST -----  Call patient and tell her that nothing abnormal was found on recent stress test   Her CT coronary calcium score was also 0, which is perfect, and her hs CRP was low risk  Based on these results, with a I do not believe she needs to be on statin medication for her cholesterol  We will send a note to Dr Mack Amaya regarding all of these results    Please fax the results of the stress test, the CT coronary calcium score, and the hs CRP to the attention of Dr Mack Amaya

## 2019-01-04 ENCOUNTER — TRANSCRIBE ORDERS (OUTPATIENT)
Dept: ADMINISTRATIVE | Facility: HOSPITAL | Age: 54
End: 2019-01-04

## 2019-01-04 DIAGNOSIS — Z12.39 SCREENING BREAST EXAMINATION: Primary | ICD-10-CM

## 2019-01-14 ENCOUNTER — ANNUAL EXAM (OUTPATIENT)
Dept: OBGYN CLINIC | Facility: CLINIC | Age: 54
End: 2019-01-14
Payer: COMMERCIAL

## 2019-01-14 VITALS
BODY MASS INDEX: 26.33 KG/M2 | WEIGHT: 158 LBS | DIASTOLIC BLOOD PRESSURE: 62 MMHG | SYSTOLIC BLOOD PRESSURE: 110 MMHG | HEIGHT: 65 IN

## 2019-01-14 DIAGNOSIS — Z01.419 ENCOUNTER FOR GYNECOLOGICAL EXAMINATION (GENERAL) (ROUTINE) WITHOUT ABNORMAL FINDINGS: ICD-10-CM

## 2019-01-14 DIAGNOSIS — Z12.31 ENCOUNTER FOR SCREENING MAMMOGRAM FOR MALIGNANT NEOPLASM OF BREAST: Primary | ICD-10-CM

## 2019-01-14 PROCEDURE — G0145 SCR C/V CYTO,THINLAYER,RESCR: HCPCS | Performed by: OBSTETRICS & GYNECOLOGY

## 2019-01-14 PROCEDURE — 99396 PREV VISIT EST AGE 40-64: CPT | Performed by: OBSTETRICS & GYNECOLOGY

## 2019-01-14 NOTE — PROGRESS NOTES
Assessment/Plan: This 50-year-old patient is seen today for annual gyn evaluation  She does have increasing hot  flashes over the past year  No problem-specific Assessment & Plan notes found for this encounter  Subjective:      Patient ID: Brittney Toledo is a 46 y o  female  This 50-year-old patient had an episode of bleeding in January of 2018  which was evaluated with an endometrial biopsy that showed benign endometrial glands and atrophy  A pelvic ultrasound August 27, 2018 showed 2 mm endometrial stripe  She has had no vaginal bleeding since January of 2018  She has had no urinary tract infections or vaginal infections over the past year  She has no pain with intercourse and does not use a lubricant  She sleeps about 7 hr at night  She has no fainting spells  She occasionally has migraine headaches  Her bowel function is normal and she does not bleed with her bowels  She occasionally may have urine stress incontinence but rarely uses liners  The she has gained 5 lb to 165 lb from year ago  Her blood pressure is good 110/62  Review of Systems   Constitutional: Negative  HENT: Negative  Eyes: Negative  Respiratory: Negative  Cardiovascular: Negative  Gastrointestinal: Negative  Endocrine: Negative  Genitourinary: Negative  Musculoskeletal: Negative  Skin: Negative  Allergic/Immunologic: Negative  Neurological: Negative  Hematological: Negative  Psychiatric/Behavioral: Negative  Objective:      /62 (BP Location: Left arm, Patient Position: Sitting)   Ht 5' 5" (1 651 m)   Wt 71 7 kg (158 lb)   LMP 06/22/2016   BMI 26 29 kg/m²          Physical Exam   Constitutional: She is oriented to person, place, and time  She appears well-developed and well-nourished  HENT:   Head: Normocephalic  Neck: Normal range of motion  Neck supple  Cardiovascular: Normal rate, regular rhythm, normal heart sounds and intact distal pulses  Pulmonary/Chest: Effort normal and breath sounds normal    Abdominal: Soft  Bowel sounds are normal    Genitourinary: Uterus normal    Musculoskeletal: Normal range of motion  Neurological: She is alert and oriented to person, place, and time  Skin: Skin is warm and dry  Psychiatric: She has a normal mood and affect  Nursing note and vitals reviewed  breast exam is normal   Pelvic exam reveals uterus to be anterior mobile normal size and well supported  No adnexal masses are identified  The there is no blood or discharge in the vagina  The vulva is normal  Rectal exam shows no blood or  masses in the rectum and no nodularity in the cul-de-sac

## 2019-01-18 LAB
LAB AP GYN PRIMARY INTERPRETATION: NORMAL
Lab: NORMAL

## 2019-01-24 ENCOUNTER — HOSPITAL ENCOUNTER (OUTPATIENT)
Dept: RADIOLOGY | Facility: HOSPITAL | Age: 54
Discharge: HOME | End: 2019-01-24
Attending: OBSTETRICS & GYNECOLOGY
Payer: COMMERCIAL

## 2019-01-24 VITALS — WEIGHT: 158 LBS | HEIGHT: 66 IN | BODY MASS INDEX: 25.39 KG/M2

## 2019-01-24 DIAGNOSIS — Z12.31 ENCOUNTER FOR SCREENING MAMMOGRAM FOR MALIGNANT NEOPLASM OF BREAST: ICD-10-CM

## 2019-01-24 PROCEDURE — 77067 SCR MAMMO BI INCL CAD: CPT

## 2020-01-16 ENCOUNTER — ANNUAL EXAM (OUTPATIENT)
Dept: OBGYN CLINIC | Facility: CLINIC | Age: 55
End: 2020-01-16
Payer: COMMERCIAL

## 2020-01-16 VITALS — WEIGHT: 169 LBS | BODY MASS INDEX: 27.28 KG/M2 | DIASTOLIC BLOOD PRESSURE: 72 MMHG | SYSTOLIC BLOOD PRESSURE: 110 MMHG

## 2020-01-16 DIAGNOSIS — Z12.4 ENCOUNTER FOR PAPANICOLAOU SMEAR FOR CERVICAL CANCER SCREENING: ICD-10-CM

## 2020-01-16 DIAGNOSIS — Z11.51 SCREENING FOR HPV (HUMAN PAPILLOMAVIRUS): ICD-10-CM

## 2020-01-16 DIAGNOSIS — Z12.31 ENCOUNTER FOR SCREENING MAMMOGRAM FOR MALIGNANT NEOPLASM OF BREAST: ICD-10-CM

## 2020-01-16 DIAGNOSIS — R63.5 WEIGHT GAIN: ICD-10-CM

## 2020-01-16 DIAGNOSIS — Z01.419 ENCOUNTER FOR GYNECOLOGICAL EXAMINATION (GENERAL) (ROUTINE) WITHOUT ABNORMAL FINDINGS: Primary | ICD-10-CM

## 2020-01-16 PROCEDURE — 99396 PREV VISIT EST AGE 40-64: CPT | Performed by: NURSE PRACTITIONER

## 2020-01-16 PROCEDURE — 87624 HPV HI-RISK TYP POOLED RSLT: CPT | Performed by: NURSE PRACTITIONER

## 2020-01-16 PROCEDURE — G0145 SCR C/V CYTO,THINLAYER,RESCR: HCPCS | Performed by: NURSE PRACTITIONER

## 2020-01-17 LAB
HPV HR 12 DNA CVX QL NAA+PROBE: NEGATIVE
HPV16 DNA CVX QL NAA+PROBE: NEGATIVE
HPV18 DNA CVX QL NAA+PROBE: NEGATIVE

## 2020-01-21 LAB
LAB AP GYN PRIMARY INTERPRETATION: NORMAL
Lab: NORMAL

## 2020-01-23 ENCOUNTER — APPOINTMENT (OUTPATIENT)
Dept: LAB | Facility: HOSPITAL | Age: 55
End: 2020-01-23
Payer: COMMERCIAL

## 2020-01-23 DIAGNOSIS — R63.5 WEIGHT GAIN: ICD-10-CM

## 2020-01-23 LAB — TSH SERPL DL<=0.05 MIU/L-ACNC: 3.25 UIU/ML (ref 0.36–3.74)

## 2020-01-23 PROCEDURE — 84443 ASSAY THYROID STIM HORMONE: CPT

## 2020-01-23 PROCEDURE — 36415 COLL VENOUS BLD VENIPUNCTURE: CPT

## 2020-01-24 PROBLEM — R63.5 WEIGHT GAIN: Status: ACTIVE | Noted: 2020-01-24

## 2020-01-24 PROBLEM — Z01.419 ENCOUNTER FOR GYNECOLOGICAL EXAMINATION (GENERAL) (ROUTINE) WITHOUT ABNORMAL FINDINGS: Status: ACTIVE | Noted: 2020-01-24

## 2020-01-24 NOTE — PROGRESS NOTES
Assessment/Plan:    Weight gain  Reviewed diet/activity recommendations and order for TSH offered to r/o thyroid dysfunction  Encounter for gynecological examination (general) (routine) without abnormal findings  Benign findings on routine gyn exam  Recommended monthly SBE, annual CBE and annual screening mammo  ASCCP guidelines reviewed and pap with cotesting was collected today; this low risk patient was advised she meets criteria to d/c pap screening at age 71  Colon cancer screening recommendations reviewed  The patient denies STI risk factors and declines testing at this time  Reviewed diet/activity recommendations:  Encouraged daily Ca++ and vitamin D intake as well as daily weight bearing exercise for promotion of bone health    Discussed postmenopausal considerations and symptoms to report  RTO in one year for routine annual gyn exam or sooner PRN  Diagnoses and all orders for this visit:    Encounter for gynecological examination (general) (routine) without abnormal findings    Encounter for screening mammogram for malignant neoplasm of breast  -     Mammo screening bilateral w cad; Future    Encounter for Papanicolaou smear for cervical cancer screening  -     Liquid-based pap, screening  -     HPV High Risk; Future  -     HPV High Risk    Screening for HPV (human papillomavirus)  -     Liquid-based pap, screening  -     HPV High Risk; Future  -     HPV High Risk    Weight gain  -     TSH, 3rd generation; Future          Subjective:      Patient ID: Kem Monaco is a 53 y o  female  This patient presents for routine annual gyn exam   Medically stable  Notes weight gain since LMP  Working on diet/exercise   She denies acute gyn complaints  She denies  bleeding or spotting, VM sx, pelvic pain, breast concerns, abn discharge, bowel/bladder dysfunction, depression/anx  Denies STI concerns          The following portions of the patient's history were reviewed and updated as appropriate: allergies, current medications, past family history, past medical history, past social history, past surgical history and problem list     Review of Systems   Constitutional: Negative  Respiratory: Negative  Cardiovascular: Negative  Gastrointestinal: Negative  Genitourinary: Negative  Musculoskeletal: Negative  Skin: Negative  Neurological: Negative  Psychiatric/Behavioral: Negative  Objective:      /72   Wt 76 7 kg (169 lb)   LMP 06/22/2016   BMI 27 28 kg/m²          Physical Exam   Constitutional: She is oriented to person, place, and time  She appears well-developed and well-nourished  HENT:   Head: Normocephalic and atraumatic  Eyes: Pupils are equal, round, and reactive to light  EOM are normal    Neck: Normal range of motion  Neck supple  No thyromegaly present  Cardiovascular: Normal rate, regular rhythm and normal heart sounds  Pulmonary/Chest: Effort normal and breath sounds normal  No respiratory distress  She has no wheezes  She has no rales  She exhibits no mass, no tenderness and no deformity  Right breast exhibits no inverted nipple, no mass, no nipple discharge, no skin change and no tenderness  Left breast exhibits no inverted nipple, no mass, no nipple discharge, no skin change and no tenderness  No breast tenderness or discharge  Breasts are symmetrical    Abdominal: Soft  She exhibits no distension and no mass  There is no splenomegaly or hepatomegaly  There is no tenderness  There is no rebound and no guarding  Genitourinary: Rectum normal, vagina normal and uterus normal  No breast tenderness or discharge  No labial fusion  There is no rash, tenderness, lesion or injury on the right labia  There is no rash, tenderness, lesion or injury on the left labia  Cervix exhibits no motion tenderness, no discharge and no friability  Right adnexum displays no mass, no tenderness and no fullness   Left adnexum displays no mass, no tenderness and no fullness  No erythema, tenderness or bleeding in the vagina  No foreign body in the vagina  No vaginal discharge found  Musculoskeletal: Normal range of motion  Lymphadenopathy:     She has no cervical adenopathy  She has no axillary adenopathy  Neurological: She is alert and oriented to person, place, and time  No cranial nerve deficit  Skin: Skin is warm and dry  No rash noted  No cyanosis  Nails show no clubbing  Psychiatric: She has a normal mood and affect   Her speech is normal and behavior is normal  Judgment and thought content normal  Cognition and memory are normal

## 2020-01-24 NOTE — ASSESSMENT & PLAN NOTE
Benign findings on routine gyn exam  Recommended monthly SBE, annual CBE and annual screening mammo  ASCCP guidelines reviewed and pap with cotesting was collected today; this low risk patient was advised she meets criteria to d/c pap screening at age 71  Colon cancer screening recommendations reviewed  The patient denies STI risk factors and declines testing at this time  Reviewed diet/activity recommendations:  Encouraged daily Ca++ and vitamin D intake as well as daily weight bearing exercise for promotion of bone health    Discussed postmenopausal considerations and symptoms to report  RTO in one year for routine annual gyn exam or sooner PRN

## 2020-01-28 ENCOUNTER — HOSPITAL ENCOUNTER (OUTPATIENT)
Dept: RADIOLOGY | Facility: HOSPITAL | Age: 55
Discharge: HOME | End: 2020-01-28
Payer: COMMERCIAL

## 2020-01-28 VITALS — HEIGHT: 66 IN | WEIGHT: 169 LBS | BODY MASS INDEX: 27.16 KG/M2

## 2020-01-28 DIAGNOSIS — Z12.31 ENCOUNTER FOR SCREENING MAMMOGRAM FOR MALIGNANT NEOPLASM OF BREAST: ICD-10-CM

## 2020-01-28 PROCEDURE — 77067 SCR MAMMO BI INCL CAD: CPT

## 2020-05-18 ENCOUNTER — APPOINTMENT (OUTPATIENT)
Dept: RADIOLOGY | Facility: CLINIC | Age: 55
End: 2020-05-18
Payer: COMMERCIAL

## 2020-05-18 ENCOUNTER — OFFICE VISIT (OUTPATIENT)
Dept: URGENT CARE | Facility: CLINIC | Age: 55
End: 2020-05-18
Payer: COMMERCIAL

## 2020-05-18 VITALS
RESPIRATION RATE: 16 BRPM | TEMPERATURE: 99.1 F | HEART RATE: 96 BPM | OXYGEN SATURATION: 99 % | WEIGHT: 167 LBS | SYSTOLIC BLOOD PRESSURE: 132 MMHG | DIASTOLIC BLOOD PRESSURE: 69 MMHG | BODY MASS INDEX: 26.84 KG/M2 | HEIGHT: 66 IN

## 2020-05-18 DIAGNOSIS — S60.00XA CONTUSION OF LEFT HAND INCLUDING FINGERS, INITIAL ENCOUNTER: Primary | ICD-10-CM

## 2020-05-18 DIAGNOSIS — S60.222A CONTUSION OF LEFT HAND INCLUDING FINGERS, INITIAL ENCOUNTER: Primary | ICD-10-CM
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
## 2020-05-18 PROCEDURE — G0382 LEV 3 HOSP TYPE B ED VISIT: HCPCS | Performed by: PHYSICIAN ASSISTANT

## 2020-05-18 PROCEDURE — 73130 X-RAY EXAM OF HAND: CPT

## 2020-09-23 ENCOUNTER — TELEPHONE (OUTPATIENT)
Dept: OBGYN CLINIC | Facility: CLINIC | Age: 55
End: 2020-09-23

## 2020-09-23 NOTE — TELEPHONE ENCOUNTER
Pt would like to talk to someone about whether she should see us or a urologist-has a possible prolapse

## 2020-09-23 NOTE — TELEPHONE ENCOUNTER
You saw pt for yly  She had bleeding with a hard BM when wiping  - looked at vulva - noticed bulge at vaginal opening  Also noticed a lump at rectum -   I put her in for ov this Mon  She also had "bearing down " in a pilates class  Do you want anything prior to appt?   Thx

## 2020-09-28 ENCOUNTER — OFFICE VISIT (OUTPATIENT)
Dept: OBGYN CLINIC | Facility: CLINIC | Age: 55
End: 2020-09-28
Payer: COMMERCIAL

## 2020-09-28 VITALS
TEMPERATURE: 99.2 F | BODY MASS INDEX: 26.79 KG/M2 | SYSTOLIC BLOOD PRESSURE: 120 MMHG | WEIGHT: 166 LBS | DIASTOLIC BLOOD PRESSURE: 72 MMHG

## 2020-09-28 DIAGNOSIS — N81.4 CYSTOCELE WITH SMALL RECTOCELE AND UTERINE DESCENT: Primary | ICD-10-CM

## 2020-09-28 PROCEDURE — 99214 OFFICE O/P EST MOD 30 MIN: CPT | Performed by: NURSE PRACTITIONER

## 2020-09-28 NOTE — PROGRESS NOTES
Assessment/Plan:    Cystocele with small rectocele and uterine descent  Exam is consistent with same  Reviewed natural hx of pelvic floor dysfunction and prolapse  Recommended pelvic US  We reviewed conservative management options including regimented program of Kegels 60x/day, pelvic PT or pessary, and discussed referral to Uro Gyn to further discuss surgical options  The patient elects to trial conservative measures in addition to obtaining Uro Gyn eval  We will f/u as indicated        Diagnoses and all orders for this visit:    Cystocele with small rectocele and uterine descent  -     Ambulatory referral to Physical Therapy; Future  -     Ambulatory referral to Urogynecology; Future          Subjective:      Patient ID: Nany Berkowitz is a 53 y o  female  This patient presents with c/o noting a bulge at her vaginal opening in addition to pelvic pressure   First noted about 1 week ago when she was self examining during a rectal fissure exacerbation - this improved when she used hydrogel  She sees a pink balloon at her vaginal opening  Also feels a pelvic fullness   Denies falling down sensation  Denies pain, bleeding, discharge, urinary or bowel incontinence   She is prone to constipation but this improved since starting stool softener  She also reports recently starting Pilates class and using her core muscles more intensely   Sexually active - denies dyspareunia       The following portions of the patient's history were reviewed and updated as appropriate: allergies, current medications, past family history, past medical history, past social history, past surgical history and problem list     Review of Systems   Constitutional: Negative  Respiratory: Negative  Cardiovascular: Negative  Gastrointestinal: Negative for abdominal pain, blood in stool, constipation, diarrhea and rectal pain          Pelvic/abd fullness  Anal fissure - improved   Genitourinary: Negative for dyspareunia, dysuria, frequency, hematuria, pelvic pain, urgency, vaginal bleeding and vaginal discharge  Prolapse at vaginal opening    Psychiatric/Behavioral: Negative  Objective:      /72   Temp 99 2 °F (37 3 °C)   Wt 75 3 kg (166 lb)   LMP 06/22/2016   BMI 26 79 kg/m²          Physical Exam  Constitutional:       Appearance: She is well-developed  HENT:      Head: Normocephalic and atraumatic  Eyes:      Pupils: Pupils are equal, round, and reactive to light  Neck:      Musculoskeletal: Normal range of motion  Pulmonary:      Effort: Pulmonary effort is normal    Abdominal:      Hernia: There is no hernia in the left inguinal area or right inguinal area  Genitourinary:     Labia:         Right: No rash, tenderness, lesion or injury  Left: No rash, tenderness, lesion or injury  Urethra: Prolapse present  Vagina: No vaginal discharge, erythema, tenderness or bleeding  Cervix: No cervical motion tenderness, discharge or friability  Uterus: Normal        Adnexa:         Right: No mass, tenderness or fullness  Left: No mass, tenderness or fullness  Rectum: External hemorrhoid present  Musculoskeletal: Normal range of motion  Skin:     General: Skin is warm and dry  Neurological:      Mental Status: She is alert and oriented to person, place, and time  Psychiatric:         Behavior: Behavior normal          Thought Content:  Thought content normal          Judgment: Judgment normal

## 2020-09-28 NOTE — ASSESSMENT & PLAN NOTE
Exam is consistent with same  Reviewed natural hx of pelvic floor dysfunction and prolapse  Recommended pelvic US  We reviewed conservative management options including regimented program of Kegels 60x/day, pelvic PT or pessary, and discussed referral to Uro Gyn to further discuss surgical options   The patient elects to trial conservative measures in addition to obtaining Uro Gyn eval  We will f/u as indicated

## 2020-10-06 ENCOUNTER — EVALUATION (OUTPATIENT)
Dept: PHYSICAL THERAPY | Facility: REHABILITATION | Age: 55
End: 2020-10-06
Payer: COMMERCIAL

## 2020-10-06 DIAGNOSIS — N81.4 CYSTOCELE WITH SMALL RECTOCELE AND UTERINE DESCENT: ICD-10-CM

## 2020-10-06 DIAGNOSIS — N81.89 PELVIC FLOOR WEAKNESS IN FEMALE: Primary | ICD-10-CM

## 2020-10-06 PROCEDURE — 97530 THERAPEUTIC ACTIVITIES: CPT | Performed by: PHYSICAL THERAPIST

## 2020-10-06 PROCEDURE — 97162 PT EVAL MOD COMPLEX 30 MIN: CPT | Performed by: PHYSICAL THERAPIST

## 2020-10-15 ENCOUNTER — OFFICE VISIT (OUTPATIENT)
Dept: PHYSICAL THERAPY | Facility: REHABILITATION | Age: 55
End: 2020-10-15
Payer: COMMERCIAL

## 2020-10-15 DIAGNOSIS — N81.4 CYSTOCELE WITH SMALL RECTOCELE AND UTERINE DESCENT: Primary | ICD-10-CM

## 2020-10-15 DIAGNOSIS — N81.89 PELVIC FLOOR WEAKNESS IN FEMALE: ICD-10-CM

## 2020-10-15 PROCEDURE — 97112 NEUROMUSCULAR REEDUCATION: CPT | Performed by: PHYSICAL THERAPIST

## 2020-10-22 ENCOUNTER — OFFICE VISIT (OUTPATIENT)
Dept: PHYSICAL THERAPY | Facility: REHABILITATION | Age: 55
End: 2020-10-22
Payer: COMMERCIAL

## 2020-10-22 DIAGNOSIS — N81.89 PELVIC FLOOR WEAKNESS IN FEMALE: ICD-10-CM

## 2020-10-22 DIAGNOSIS — N81.4 CYSTOCELE WITH SMALL RECTOCELE AND UTERINE DESCENT: Primary | ICD-10-CM

## 2020-10-22 PROCEDURE — 97530 THERAPEUTIC ACTIVITIES: CPT | Performed by: PHYSICAL THERAPIST

## 2020-10-22 PROCEDURE — 97110 THERAPEUTIC EXERCISES: CPT | Performed by: PHYSICAL THERAPIST

## 2020-10-22 PROCEDURE — 97112 NEUROMUSCULAR REEDUCATION: CPT | Performed by: PHYSICAL THERAPIST

## 2020-10-29 ENCOUNTER — APPOINTMENT (OUTPATIENT)
Dept: PHYSICAL THERAPY | Facility: REHABILITATION | Age: 55
End: 2020-10-29
Payer: COMMERCIAL

## 2020-11-05 ENCOUNTER — OFFICE VISIT (OUTPATIENT)
Dept: PHYSICAL THERAPY | Facility: REHABILITATION | Age: 55
End: 2020-11-05
Payer: COMMERCIAL

## 2020-11-05 DIAGNOSIS — N81.89 PELVIC FLOOR WEAKNESS IN FEMALE: ICD-10-CM

## 2020-11-05 DIAGNOSIS — N81.4 CYSTOCELE WITH SMALL RECTOCELE AND UTERINE DESCENT: Primary | ICD-10-CM

## 2020-11-05 PROCEDURE — 97112 NEUROMUSCULAR REEDUCATION: CPT | Performed by: PHYSICAL THERAPIST

## 2020-11-05 PROCEDURE — 97530 THERAPEUTIC ACTIVITIES: CPT | Performed by: PHYSICAL THERAPIST

## 2020-11-05 PROCEDURE — 97140 MANUAL THERAPY 1/> REGIONS: CPT | Performed by: PHYSICAL THERAPIST

## 2020-11-05 PROCEDURE — 97110 THERAPEUTIC EXERCISES: CPT | Performed by: PHYSICAL THERAPIST

## 2020-11-19 ENCOUNTER — APPOINTMENT (OUTPATIENT)
Dept: PHYSICAL THERAPY | Facility: REHABILITATION | Age: 55
End: 2020-11-19
Payer: COMMERCIAL

## 2020-12-30 ENCOUNTER — IMMUNIZATIONS (OUTPATIENT)
Dept: FAMILY MEDICINE CLINIC | Facility: HOSPITAL | Age: 55
End: 2020-12-30
Payer: COMMERCIAL

## 2020-12-30 DIAGNOSIS — Z23 ENCOUNTER FOR IMMUNIZATION: ICD-10-CM

## 2020-12-30 PROCEDURE — 0011A SARS-COV-2 / COVID-19 MRNA VACCINE (MODERNA) 100 MCG: CPT

## 2020-12-30 PROCEDURE — 91301 SARS-COV-2 / COVID-19 MRNA VACCINE (MODERNA) 100 MCG: CPT

## 2021-01-19 ENCOUNTER — TRANSCRIBE ORDERS (OUTPATIENT)
Dept: ADMINISTRATIVE | Facility: HOSPITAL | Age: 56
End: 2021-01-19

## 2021-01-19 DIAGNOSIS — Z12.31 SCREENING MAMMOGRAM FOR HIGH-RISK PATIENT: Primary | ICD-10-CM

## 2021-01-21 ENCOUNTER — ANNUAL EXAM (OUTPATIENT)
Dept: OBGYN CLINIC | Facility: CLINIC | Age: 56
End: 2021-01-21
Payer: COMMERCIAL

## 2021-01-21 VITALS
SYSTOLIC BLOOD PRESSURE: 120 MMHG | BODY MASS INDEX: 27.13 KG/M2 | WEIGHT: 168.8 LBS | DIASTOLIC BLOOD PRESSURE: 76 MMHG | HEIGHT: 66 IN

## 2021-01-21 DIAGNOSIS — Z01.419 ENCOUNTER FOR GYNECOLOGICAL EXAMINATION (GENERAL) (ROUTINE) WITHOUT ABNORMAL FINDINGS: Primary | ICD-10-CM

## 2021-01-21 DIAGNOSIS — Z01.419 ROUTINE GYNECOLOGICAL EXAMINATION: ICD-10-CM

## 2021-01-21 DIAGNOSIS — N81.4 CYSTOCELE WITH SMALL RECTOCELE AND UTERINE DESCENT: ICD-10-CM

## 2021-01-21 DIAGNOSIS — Z13.228 SCREENING FOR ENDOCRINE, METABOLIC AND IMMUNITY DISORDER: ICD-10-CM

## 2021-01-21 DIAGNOSIS — Z13.0 SCREENING FOR ENDOCRINE, METABOLIC AND IMMUNITY DISORDER: ICD-10-CM

## 2021-01-21 DIAGNOSIS — Z13.29 SCREENING FOR ENDOCRINE, METABOLIC AND IMMUNITY DISORDER: ICD-10-CM

## 2021-01-21 DIAGNOSIS — L70.9 ACNE, UNSPECIFIED ACNE TYPE: ICD-10-CM

## 2021-01-21 PROCEDURE — 99396 PREV VISIT EST AGE 40-64: CPT | Performed by: NURSE PRACTITIONER

## 2021-01-21 NOTE — PROGRESS NOTES
Assessment/Plan:    Acne  Refill requested on tretinoin, as she is having difficulty scheduling an appt with Derm  Rx was sent and she will f/u with them    Encounter for gynecological examination (general) (routine) without abnormal findings  Benign findings on routine gyn exam  Recommended monthly SBE, annual CBE and annual screening mammo  ASCCP guidelines reviewed and pap with cotesting noted to be up to date  Colonoscopy noted to be up to date (2015; q10 per pt)  The patient denies STI risk factors and declines testing at this time  Reviewed diet/activity recommendations:  Encouraged daily Ca++ and vitamin D intake as well as daily weight bearing exercise for promotion of bone health    Discussed postmenopausal considerations and symptoms to report  Routine labs were requested by the patient and these were provided  RTO in one year for routine annual gyn exam or sooner PRN  Cystocele with small rectocele and uterine descent  Resolved since pelvic PT  Normal exam today  She plans to continue exercises at home        Diagnoses and all orders for this visit:    Encounter for gynecological examination (general) (routine) without abnormal findings    Routine gynecological examination    Acne, unspecified acne type  -     tretinoin (RETIN-A) 0 025 % cream; Apply thin film topically once daily    Screening for endocrine, metabolic and immunity disorder  -     CBC; Future  -     Comprehensive metabolic panel; Future  -     Lipid panel; Future  -     Hemoglobin A1C; Future  -     TSH, 3rd generation with Free T4 reflex; Future    Cystocele with small rectocele and uterine descent          Subjective:      Patient ID: Aleksandar Cleveland is a 63 y o  female  This patient presents for routine annual gyn exam   Medically stable  Cystocele resolved since pelvic PT  Now doing exercises independently   She denies acute gyn complaints   She denies  bleeding or spotting, VM sx, pelvic pain, breast concerns, abn discharge, bowel/bladder dysfunction, depression/anx  Long term, monog  Denies STI concerns  The following portions of the patient's history were reviewed and updated as appropriate: allergies, current medications, past family history, past medical history, past social history, past surgical history and problem list     Review of Systems   Constitutional: Negative  Respiratory: Negative  Cardiovascular: Negative  Gastrointestinal: Negative  Genitourinary: Negative  Musculoskeletal: Negative  Skin: Negative  Neurological: Negative  Psychiatric/Behavioral: Negative  Objective:      /76 (BP Location: Left arm, Patient Position: Sitting, Cuff Size: Standard)   Ht 5' 6" (1 676 m)   Wt 76 6 kg (168 lb 12 8 oz)   LMP 06/22/2016   BMI 27 25 kg/m²          Physical Exam  Constitutional:       Appearance: She is well-developed  HENT:      Head: Normocephalic and atraumatic  Eyes:      Pupils: Pupils are equal, round, and reactive to light  Neck:      Musculoskeletal: Normal range of motion and neck supple  Thyroid: No thyromegaly  Cardiovascular:      Rate and Rhythm: Normal rate and regular rhythm  Heart sounds: Normal heart sounds  Pulmonary:      Effort: Pulmonary effort is normal  No respiratory distress  Breath sounds: Normal breath sounds  No wheezing or rales  Chest:      Chest wall: No mass, deformity or tenderness  Breasts: Breasts are symmetrical          Right: No inverted nipple, mass, nipple discharge, skin change or tenderness  Left: No inverted nipple, mass, nipple discharge, skin change or tenderness  Abdominal:      General: There is no distension  Palpations: Abdomen is soft  There is no hepatomegaly, splenomegaly or mass  Tenderness: There is no abdominal tenderness  There is no guarding or rebound  Genitourinary:     Labia:         Right: No rash, tenderness, lesion or injury           Left: No rash, tenderness, lesion or injury  Vagina: Normal  No foreign body  No vaginal discharge, erythema, tenderness or bleeding  Cervix: No cervical motion tenderness, discharge or friability  Uterus: Normal        Adnexa:         Right: No mass, tenderness or fullness  Left: No mass, tenderness or fullness  Rectum: Normal    Musculoskeletal: Normal range of motion  Lymphadenopathy:      Cervical: No cervical adenopathy  Skin:     General: Skin is warm and dry  Findings: No rash  Nails: There is no clubbing  Neurological:      Mental Status: She is alert and oriented to person, place, and time  Cranial Nerves: No cranial nerve deficit  Psychiatric:         Speech: Speech normal          Behavior: Behavior normal          Thought Content:  Thought content normal          Judgment: Judgment normal

## 2021-01-21 NOTE — ASSESSMENT & PLAN NOTE
Refill requested on tretinoin, as she is having difficulty scheduling an appt with Derm   Rx was sent and she will f/u with them

## 2021-01-21 NOTE — ASSESSMENT & PLAN NOTE
Benign findings on routine gyn exam  Recommended monthly SBE, annual CBE and annual screening mammo  ASCCP guidelines reviewed and pap with cotesting noted to be up to date  Colonoscopy noted to be up to date (2015; q10 per pt)  The patient denies STI risk factors and declines testing at this time  Reviewed diet/activity recommendations:  Encouraged daily Ca++ and vitamin D intake as well as daily weight bearing exercise for promotion of bone health    Discussed postmenopausal considerations and symptoms to report  Routine labs were requested by the patient and these were provided  RTO in one year for routine annual gyn exam or sooner PRN

## 2021-01-26 ENCOUNTER — LAB (OUTPATIENT)
Dept: LAB | Facility: HOSPITAL | Age: 56
End: 2021-01-26
Payer: COMMERCIAL

## 2021-01-26 ENCOUNTER — IMMUNIZATIONS (OUTPATIENT)
Dept: FAMILY MEDICINE CLINIC | Facility: HOSPITAL | Age: 56
End: 2021-01-26
Payer: COMMERCIAL

## 2021-01-26 DIAGNOSIS — Z13.0 SCREENING FOR ENDOCRINE, METABOLIC AND IMMUNITY DISORDER: ICD-10-CM

## 2021-01-26 DIAGNOSIS — Z13.29 SCREENING FOR ENDOCRINE, METABOLIC AND IMMUNITY DISORDER: ICD-10-CM

## 2021-01-26 DIAGNOSIS — Z23 ENCOUNTER FOR IMMUNIZATION: Primary | ICD-10-CM

## 2021-01-26 DIAGNOSIS — Z13.228 SCREENING FOR ENDOCRINE, METABOLIC AND IMMUNITY DISORDER: ICD-10-CM

## 2021-01-26 LAB
ALBUMIN SERPL BCP-MCNC: 3.8 G/DL (ref 3.5–5)
ALP SERPL-CCNC: 71 U/L (ref 46–116)
ALT SERPL W P-5'-P-CCNC: 40 U/L (ref 12–78)
ANION GAP SERPL CALCULATED.3IONS-SCNC: 5 MMOL/L (ref 4–13)
AST SERPL W P-5'-P-CCNC: 39 U/L (ref 5–45)
BILIRUB SERPL-MCNC: 0.49 MG/DL (ref 0.2–1)
BUN SERPL-MCNC: 13 MG/DL (ref 5–25)
CALCIUM SERPL-MCNC: 9.5 MG/DL (ref 8.3–10.1)
CHLORIDE SERPL-SCNC: 110 MMOL/L (ref 100–108)
CHOLEST SERPL-MCNC: 206 MG/DL (ref 50–200)
CO2 SERPL-SCNC: 25 MMOL/L (ref 21–32)
CREAT SERPL-MCNC: 0.68 MG/DL (ref 0.6–1.3)
ERYTHROCYTE [DISTWIDTH] IN BLOOD BY AUTOMATED COUNT: 13.3 % (ref 11.6–15.1)
EST. AVERAGE GLUCOSE BLD GHB EST-MCNC: 111 MG/DL
GFR SERPL CREATININE-BSD FRML MDRD: 98 ML/MIN/1.73SQ M
GLUCOSE P FAST SERPL-MCNC: 93 MG/DL (ref 65–99)
HBA1C MFR BLD: 5.5 %
HCT VFR BLD AUTO: 42.9 % (ref 34.8–46.1)
HDLC SERPL-MCNC: 57 MG/DL
HGB BLD-MCNC: 13.7 G/DL (ref 11.5–15.4)
LDLC SERPL CALC-MCNC: 128 MG/DL (ref 0–100)
MCH RBC QN AUTO: 29.7 PG (ref 26.8–34.3)
MCHC RBC AUTO-ENTMCNC: 31.9 G/DL (ref 31.4–37.4)
MCV RBC AUTO: 93 FL (ref 82–98)
NONHDLC SERPL-MCNC: 149 MG/DL
PLATELET # BLD AUTO: 328 THOUSANDS/UL (ref 149–390)
PMV BLD AUTO: 9.8 FL (ref 8.9–12.7)
POTASSIUM SERPL-SCNC: 4.6 MMOL/L (ref 3.5–5.3)
PROT SERPL-MCNC: 7.9 G/DL (ref 6.4–8.2)
RBC # BLD AUTO: 4.61 MILLION/UL (ref 3.81–5.12)
SODIUM SERPL-SCNC: 140 MMOL/L (ref 136–145)
TRIGL SERPL-MCNC: 105 MG/DL
TSH SERPL DL<=0.05 MIU/L-ACNC: 2.58 UIU/ML (ref 0.36–3.74)
WBC # BLD AUTO: 4.23 THOUSAND/UL (ref 4.31–10.16)

## 2021-01-26 PROCEDURE — 84443 ASSAY THYROID STIM HORMONE: CPT

## 2021-01-26 PROCEDURE — 0012A SARS-COV-2 / COVID-19 MRNA VACCINE (MODERNA) 100 MCG: CPT

## 2021-01-26 PROCEDURE — 36415 COLL VENOUS BLD VENIPUNCTURE: CPT

## 2021-01-26 PROCEDURE — 80061 LIPID PANEL: CPT

## 2021-01-26 PROCEDURE — 85027 COMPLETE CBC AUTOMATED: CPT

## 2021-01-26 PROCEDURE — 80053 COMPREHEN METABOLIC PANEL: CPT

## 2021-01-26 PROCEDURE — 91301 SARS-COV-2 / COVID-19 MRNA VACCINE (MODERNA) 100 MCG: CPT

## 2021-01-26 PROCEDURE — 83036 HEMOGLOBIN GLYCOSYLATED A1C: CPT

## 2021-01-27 ENCOUNTER — TELEPHONE (OUTPATIENT)
Dept: OBGYN CLINIC | Facility: CLINIC | Age: 56
End: 2021-01-27

## 2021-01-27 NOTE — TELEPHONE ENCOUNTER
Called [phone] started a case for auth on her acne medication tretinion, case pending #[id number]

## 2021-01-27 NOTE — TELEPHONE ENCOUNTER
Spoke to pt in re: to results  She did mention at last visit the acne med prescribed was never typically covered by her insurance and she was paying OOP  Pharmacy told her if we call ins co and tell them what it is being used for it would be covered        ----- Message from Elsie Soto sent at 1/27/2021  8:15 AM EST -----  Please notify patient that her hemoglobin a1c is normal (ruling out diabetes), complete metabolic panel is normal (reflecting normal liver and kidney function), and her thyroid function test is also normal  Total cholesterol is elevated, as is LDL ("bad cholesterol") - for this I would recommend that she follows up with a PCP to arrange for further monitoring and management recommendations

## 2021-02-11 ENCOUNTER — OFFICE VISIT (OUTPATIENT)
Dept: FAMILY MEDICINE CLINIC | Facility: CLINIC | Age: 56
End: 2021-02-11
Payer: COMMERCIAL

## 2021-02-11 ENCOUNTER — TELEPHONE (OUTPATIENT)
Dept: OTHER | Facility: OTHER | Age: 56
End: 2021-02-11

## 2021-02-11 VITALS
WEIGHT: 169 LBS | BODY MASS INDEX: 27.16 KG/M2 | TEMPERATURE: 97.6 F | SYSTOLIC BLOOD PRESSURE: 110 MMHG | HEART RATE: 82 BPM | DIASTOLIC BLOOD PRESSURE: 80 MMHG | RESPIRATION RATE: 16 BRPM | OXYGEN SATURATION: 99 % | HEIGHT: 66 IN

## 2021-02-11 DIAGNOSIS — R53.83 FATIGUE, UNSPECIFIED TYPE: ICD-10-CM

## 2021-02-11 DIAGNOSIS — K86.2 CYST OF PANCREAS: ICD-10-CM

## 2021-02-11 DIAGNOSIS — H04.123 DRY EYES: ICD-10-CM

## 2021-02-11 DIAGNOSIS — F41.8 SITUATIONAL ANXIETY: ICD-10-CM

## 2021-02-11 DIAGNOSIS — K11.7 XEROSTOMIA: ICD-10-CM

## 2021-02-11 DIAGNOSIS — G25.81 RESTLESS LEG SYNDROME: ICD-10-CM

## 2021-02-11 DIAGNOSIS — K63.5 POLYP OF COLON, UNSPECIFIED PART OF COLON, UNSPECIFIED TYPE: ICD-10-CM

## 2021-02-11 DIAGNOSIS — N76.0 ACUTE VAGINITIS: ICD-10-CM

## 2021-02-11 DIAGNOSIS — G25.81 RESTLESS LEG SYNDROME: Primary | ICD-10-CM

## 2021-02-11 DIAGNOSIS — N95.0 POST-MENOPAUSAL BLEEDING: Primary | ICD-10-CM

## 2021-02-11 PROCEDURE — 99214 OFFICE O/P EST MOD 30 MIN: CPT | Performed by: FAMILY MEDICINE

## 2021-02-11 RX ORDER — FLUCONAZOLE 150 MG/1
150 TABLET ORAL ONCE
Qty: 1 TABLET | Refills: 1 | Status: SHIPPED | OUTPATIENT
Start: 2021-02-11 | End: 2021-02-11

## 2021-02-11 RX ORDER — PRAMIPEXOLE DIHYDROCHLORIDE 0.25 MG/1
0.25 TABLET ORAL
Qty: 90 TABLET | Refills: 3 | Status: SHIPPED | OUTPATIENT
Start: 2021-02-11

## 2021-02-11 NOTE — PROGRESS NOTES
Subjective:           Problem List Items Addressed This Visit        Digestive    Cyst of pancreas       Other    Fatigue    Post-menopausal bleeding - Primary    Situational anxiety stable off meds    Restless leg syndrome Fe supplement call with meds      Other Visit Diagnoses     Polyp of colon, unspecified part of colon, unspecified type        Relevant Orders    Ambulatory referral for colonoscopy    Dry eyes        Relevant Orders    JAC Screen w/ Reflex to Titer/Pattern    Xerostomia     Hydrate lemon dropd    Relevant Orders    JAC Screen w/ Reflex to Titer/Pattern    Acute vaginitis        Relevant Medications    fluconazole (DIFLUCAN) 150 mg tablet              Orders Placed This Encounter   Procedures    JAC Screen w/ Reflex to Titer/Pattern     Standing Status:   Future     Standing Expiration Date:   2/11/2022   Griselda Ashby Ambulatory referral for colonoscopy     Standing Status:   Future     Standing Expiration Date:   2/11/2022     Referral Priority:   Routine     Referral Type:   Consult - AMB     Referral Reason:   Specialty Services Required     Requested Specialty:   Gastroenterology     Number of Visits Requested:   1     Expiration Date:   2/11/2022       Patient Instructions   Continue supplementation f/u GI Colonoscopy mammogram GYN    BMI Counseling: Body mass index is 27 28 kg/m²  Discussed the patient's BMI with her  The BMI is above normal  Nutrition recommendations include reducing fast food intake, decreasing soda and/or juice intake, moderation in carbohydrate intake, increasing intake of lean protein and reducing intake of saturated fat and trans fat  Jyl Aus    Chief Complaint   Patient presents with    Follow-up     f/u visit on few on going issue, dry eyes,mouth also dental issue grind her teeth a lot,had tooth extraction end of last yr started implant i,finished antibiotic started yeast needs medicine for it  wants to check for sjorgen symptom   no new food or drug allergies  chronic Toe nail fungs   on/off left hip pain  also restless leg/arm worst needs medicine as well  HPI f/u Pancreatic cyst fatigue anemia needs colon Hgb improved with supplement after Vag bleeding follows GYN    /80 (BP Location: Left arm, Patient Position: Sitting, Cuff Size: Standard)   Pulse 82   Temp 97 6 °F (36 4 °C) (Tympanic)   Resp 16   Ht 5' 6" (1 676 m)   Wt 76 7 kg (169 lb)   LMP 06/22/2016   SpO2 99%   BMI 27 28 kg/m²       No Known Allergies    Current Outpatient Medications on File Prior to Visit   Medication Sig Dispense Refill    Iron-Vitamin C (IRON 100/C) 100-250 MG TABS Take by mouth once a week        Multiple Vitamin (MULTI-VITAMIN DAILY) TABS Take 1 tablet by mouth daily      tretinoin (RETIN-A) 0 025 % cream Apply thin film topically once daily 45 g 3    Calcium Carb-Cholecalciferol (CALCIUM 1000 + D) 1000-800 MG-UNIT TABS Take by mouth daily        guaifenesin-codeine (GUAIFENESIN AC) 100-10 MG/5ML liquid Take 5 mL by mouth 3 (three) times a day as needed for cough (Patient not taking: Reported on 1/16/2020) 120 mL 0    LORazepam (ATIVAN) 0 5 mg tablet Take 1 tablet (0 5 mg total) by mouth as needed for anxiety (Launch) Take as need for dental work (Patient not taking: Reported on 2/11/2021) 30 tablet 0    Omega-3 Fatty Acids (FISH OIL) 645 MG CAPS Take by mouth once a week        tobramycin-dexamethasone (TOBRADEX) ophthalmic suspension Administer 1 drop to the right eye every 4 (four) hours while awake (Patient not taking: Reported on 1/16/2020) 5 mL 1     No current facility-administered medications on file prior to visit  Past Medical History:   Diagnosis Date    Lyme disease        No past surgical history on file  reports that she has never smoked  She has never used smokeless tobacco  She reports current alcohol use  She reports that she does not use drugs  reports that she has never smoked   She has never used smokeless tobacco         Review of Systems   Constitutional: Negative for activity change, chills and fever  HENT: Negative for sinus pain, sore throat and trouble swallowing  Dry eyes  Dry mouth   Eyes: Negative for pain  Respiratory: Negative for apnea, cough, chest tightness, shortness of breath, wheezing and stridor  Cardiovascular: Negative for chest pain  Gastrointestinal: Negative for abdominal distention, blood in stool and rectal pain  Endocrine: Negative for cold intolerance and polydipsia  Genitourinary: Negative for flank pain, genital sores, hematuria and urgency  Musculoskeletal: Negative for arthralgias, gait problem and neck pain  Skin: Negative for color change and rash  cheilitis   Neurological: Negative for dizziness, tremors, seizures and headaches  Hematological: Negative for adenopathy  Psychiatric/Behavioral: Negative for agitation, behavioral problems, confusion, hallucinations, sleep disturbance and suicidal ideas  All other systems reviewed and are negative  Physical Exam  Constitutional:       Appearance: She is well-developed  HENT:      Head: Normocephalic and atraumatic  Eyes:      General: No scleral icterus  Conjunctiva/sclera: Conjunctivae normal       Pupils: Pupils are equal, round, and reactive to light  Neck:      Thyroid: No thyromegaly  Vascular: No JVD  Cardiovascular:      Rate and Rhythm: Normal rate and regular rhythm  Heart sounds: No murmur  Pulmonary:      Effort: Pulmonary effort is normal       Breath sounds: Normal breath sounds  No stridor  Abdominal:      General: There is no distension  Tenderness: There is no guarding  Musculoskeletal:         General: No deformity  Lymphadenopathy:      Cervical: No cervical adenopathy  Skin:     Capillary Refill: Capillary refill takes less than 2 seconds  Findings: No erythema  Neurological:      General: No focal deficit present        Cranial Nerves: No cranial nerve deficit  Psychiatric:         Behavior: Behavior normal          Thought Content:  Thought content normal          Judgment: Judgment normal

## 2021-02-11 NOTE — TELEPHONE ENCOUNTER
Pt called to report dosage for her Pramipexole Dihydrochloride tablets  She said 0 25mg tablets were prescribed  Directions: take 0 5-3 tablets

## 2021-02-17 ENCOUNTER — TELEPHONE (OUTPATIENT)
Dept: OBGYN CLINIC | Facility: CLINIC | Age: 56
End: 2021-02-17

## 2021-02-17 NOTE — TELEPHONE ENCOUNTER
Patient calling with another episode of PMB  Started monistat a few days ago for a yeast infection, now having vaginal bleeding  States feels bleeding is "hormonal", also experiencing breast tenderness  Bleeding is red, changing pantiliner a few times a day

## 2021-02-18 NOTE — TELEPHONE ENCOUNTER
Left message to call back  Dr Jodell Crigler not here today but can anticipate he will want patient to be seen

## 2021-02-19 ENCOUNTER — LAB (OUTPATIENT)
Dept: LAB | Facility: HOSPITAL | Age: 56
End: 2021-02-19
Payer: COMMERCIAL

## 2021-02-19 DIAGNOSIS — K11.7 XEROSTOMIA: ICD-10-CM

## 2021-02-19 DIAGNOSIS — H04.123 DRY EYES: ICD-10-CM

## 2021-02-19 PROCEDURE — 36415 COLL VENOUS BLD VENIPUNCTURE: CPT

## 2021-02-19 PROCEDURE — 86038 ANTINUCLEAR ANTIBODIES: CPT

## 2021-02-19 NOTE — TELEPHONE ENCOUNTER
Call or ask her to make an appointment to be seen for postmenopausal bleeding with possible endometrial biopsy

## 2021-02-20 ENCOUNTER — HOSPITAL ENCOUNTER (INPATIENT)
Facility: HOSPITAL | Age: 56
LOS: 1 days | Discharge: HOME | DRG: 355 | End: 2021-02-22
Attending: EMERGENCY MEDICINE | Admitting: SURGERY
Payer: COMMERCIAL

## 2021-02-20 DIAGNOSIS — K56.609 BOWEL OBSTRUCTION (HCC): ICD-10-CM

## 2021-02-20 DIAGNOSIS — K56.609 SMALL BOWEL OBSTRUCTION (HCC): Primary | ICD-10-CM

## 2021-02-20 LAB
BASOPHILS # BLD AUTO: 0.07 THOUSANDS/ΜL (ref 0–0.1)
BASOPHILS NFR BLD AUTO: 1 % (ref 0–1)
EOSINOPHIL # BLD AUTO: 0.16 THOUSAND/ΜL (ref 0–0.61)
EOSINOPHIL NFR BLD AUTO: 2 % (ref 0–6)
ERYTHROCYTE [DISTWIDTH] IN BLOOD BY AUTOMATED COUNT: 13.2 % (ref 11.6–15.1)
HCT VFR BLD AUTO: 41.4 % (ref 34.8–46.1)
HGB BLD-MCNC: 12.8 G/DL (ref 11.5–15.4)
IMM GRANULOCYTES # BLD AUTO: 0.03 THOUSAND/UL (ref 0–0.2)
IMM GRANULOCYTES NFR BLD AUTO: 0 % (ref 0–2)
LYMPHOCYTES # BLD AUTO: 2.62 THOUSANDS/ΜL (ref 0.6–4.47)
LYMPHOCYTES NFR BLD AUTO: 35 % (ref 14–44)
MCH RBC QN AUTO: 29.3 PG (ref 26.8–34.3)
MCHC RBC AUTO-ENTMCNC: 30.9 G/DL (ref 31.4–37.4)
MCV RBC AUTO: 95 FL (ref 82–98)
MONOCYTES # BLD AUTO: 0.54 THOUSAND/ΜL (ref 0.17–1.22)
MONOCYTES NFR BLD AUTO: 7 % (ref 4–12)
NEUTROPHILS # BLD AUTO: 4.1 THOUSANDS/ΜL (ref 1.85–7.62)
NEUTS SEG NFR BLD AUTO: 55 % (ref 43–75)
NRBC BLD AUTO-RTO: 0 /100 WBCS
PLATELET # BLD AUTO: 327 THOUSANDS/UL (ref 149–390)
PMV BLD AUTO: 9.6 FL (ref 8.9–12.7)
RBC # BLD AUTO: 4.37 MILLION/UL (ref 3.81–5.12)
WBC # BLD AUTO: 7.52 THOUSAND/UL (ref 4.31–10.16)

## 2021-02-20 PROCEDURE — 96361 HYDRATE IV INFUSION ADD-ON: CPT

## 2021-02-20 PROCEDURE — 80053 COMPREHEN METABOLIC PANEL: CPT | Performed by: EMERGENCY MEDICINE

## 2021-02-20 PROCEDURE — 96375 TX/PRO/DX INJ NEW DRUG ADDON: CPT

## 2021-02-20 PROCEDURE — 36415 COLL VENOUS BLD VENIPUNCTURE: CPT | Performed by: EMERGENCY MEDICINE

## 2021-02-20 PROCEDURE — 96374 THER/PROPH/DIAG INJ IV PUSH: CPT

## 2021-02-20 PROCEDURE — 93005 ELECTROCARDIOGRAM TRACING: CPT

## 2021-02-20 PROCEDURE — 83690 ASSAY OF LIPASE: CPT | Performed by: EMERGENCY MEDICINE

## 2021-02-20 PROCEDURE — 99285 EMERGENCY DEPT VISIT HI MDM: CPT

## 2021-02-20 PROCEDURE — 85025 COMPLETE CBC W/AUTO DIFF WBC: CPT | Performed by: EMERGENCY MEDICINE

## 2021-02-20 RX ORDER — ONDANSETRON 2 MG/ML
4 INJECTION INTRAMUSCULAR; INTRAVENOUS ONCE
Status: COMPLETED | OUTPATIENT
Start: 2021-02-20 | End: 2021-02-20

## 2021-02-20 RX ORDER — MORPHINE SULFATE 4 MG/ML
4 INJECTION, SOLUTION INTRAMUSCULAR; INTRAVENOUS ONCE
Status: COMPLETED | OUTPATIENT
Start: 2021-02-20 | End: 2021-02-20

## 2021-02-20 RX ORDER — KETOROLAC TROMETHAMINE 30 MG/ML
15 INJECTION, SOLUTION INTRAMUSCULAR; INTRAVENOUS ONCE
Status: COMPLETED | OUTPATIENT
Start: 2021-02-20 | End: 2021-02-20

## 2021-02-20 RX ADMIN — KETOROLAC TROMETHAMINE 15 MG: 30 INJECTION, SOLUTION INTRAMUSCULAR at 23:43

## 2021-02-20 RX ADMIN — ONDANSETRON 4 MG: 2 INJECTION INTRAMUSCULAR; INTRAVENOUS at 23:44

## 2021-02-20 RX ADMIN — MORPHINE SULFATE 4 MG: 4 INJECTION INTRAVENOUS at 23:44

## 2021-02-20 RX ADMIN — SODIUM CHLORIDE 1000 ML: 0.9 INJECTION, SOLUTION INTRAVENOUS at 23:43

## 2021-02-20 NOTE — LETTER
[address]  [facility] [address]  [place] [address]  Dept: [phone]    February 21, 2021     Patient: Te Vazquez   YOB: 1965   Date of Visit: 2/20/2021       To Whom it May Concern:    Radha Callahan is under my professional care  She was seen in the hospital from 2/20/2021   to 02/21/21  Please excuse illness  If you have any questions or concerns, please don't hesitate to call           Sincerely,          Sherry Kilgore PA-C change mental status

## 2021-02-21 ENCOUNTER — APPOINTMENT (EMERGENCY)
Dept: RADIOLOGY | Facility: HOSPITAL | Age: 56
DRG: 355 | End: 2021-02-21
Payer: COMMERCIAL

## 2021-02-21 ENCOUNTER — ANESTHESIA EVENT (INPATIENT)
Dept: PERIOP | Facility: HOSPITAL | Age: 56
DRG: 355 | End: 2021-02-21
Payer: COMMERCIAL

## 2021-02-21 ENCOUNTER — ANESTHESIA (INPATIENT)
Dept: PERIOP | Facility: HOSPITAL | Age: 56
DRG: 355 | End: 2021-02-21
Payer: COMMERCIAL

## 2021-02-21 VITALS — HEART RATE: 92 BPM

## 2021-02-21 PROBLEM — K56.609 SMALL BOWEL OBSTRUCTION (HCC): Status: ACTIVE | Noted: 2021-02-21

## 2021-02-21 LAB
ALBUMIN SERPL BCP-MCNC: 3.5 G/DL (ref 3.5–5)
ALP SERPL-CCNC: 70 U/L (ref 46–116)
ALT SERPL W P-5'-P-CCNC: 42 U/L (ref 12–78)
ANION GAP SERPL CALCULATED.3IONS-SCNC: 7 MMOL/L (ref 4–13)
AST SERPL W P-5'-P-CCNC: 20 U/L (ref 5–45)
BILIRUB SERPL-MCNC: 0.1 MG/DL (ref 0.2–1)
BUN SERPL-MCNC: 15 MG/DL (ref 5–25)
CALCIUM SERPL-MCNC: 8.6 MG/DL (ref 8.3–10.1)
CHLORIDE SERPL-SCNC: 104 MMOL/L (ref 100–108)
CO2 SERPL-SCNC: 30 MMOL/L (ref 21–32)
CREAT SERPL-MCNC: 0.77 MG/DL (ref 0.6–1.3)
FLUAV RNA RESP QL NAA+PROBE: NEGATIVE
FLUBV RNA RESP QL NAA+PROBE: NEGATIVE
GFR SERPL CREATININE-BSD FRML MDRD: 87 ML/MIN/1.73SQ M
GLUCOSE SERPL-MCNC: 111 MG/DL (ref 65–140)
GLUCOSE SERPL-MCNC: 128 MG/DL (ref 65–140)
LIPASE SERPL-CCNC: 149 U/L (ref 73–393)
POTASSIUM SERPL-SCNC: 3.5 MMOL/L (ref 3.5–5.3)
PROT SERPL-MCNC: 7.4 G/DL (ref 6.4–8.2)
RSV RNA RESP QL NAA+PROBE: NEGATIVE
SARS-COV-2 RNA RESP QL NAA+PROBE: NEGATIVE
SODIUM SERPL-SCNC: 141 MMOL/L (ref 136–145)

## 2021-02-21 PROCEDURE — 99285 EMERGENCY DEPT VISIT HI MDM: CPT | Performed by: EMERGENCY MEDICINE

## 2021-02-21 PROCEDURE — 0WQF0ZZ REPAIR ABDOMINAL WALL, OPEN APPROACH: ICD-10-PCS | Performed by: SURGERY

## 2021-02-21 PROCEDURE — G1004 CDSM NDSC: HCPCS

## 2021-02-21 PROCEDURE — 96361 HYDRATE IV INFUSION ADD-ON: CPT

## 2021-02-21 PROCEDURE — 0241U HB NFCT DS VIR RESP RNA 4 TRGT: CPT | Performed by: SURGERY

## 2021-02-21 PROCEDURE — 82948 REAGENT STRIP/BLOOD GLUCOSE: CPT

## 2021-02-21 PROCEDURE — 44238 UNLISTED LAPS PX INTESTINE: CPT | Performed by: SURGERY

## 2021-02-21 PROCEDURE — 74177 CT ABD & PELVIS W/CONTRAST: CPT

## 2021-02-21 PROCEDURE — 87070 CULTURE OTHR SPECIMN AEROBIC: CPT | Performed by: SURGERY

## 2021-02-21 PROCEDURE — 87075 CULTR BACTERIA EXCEPT BLOOD: CPT | Performed by: SURGERY

## 2021-02-21 PROCEDURE — 99222 1ST HOSP IP/OBS MODERATE 55: CPT | Performed by: SURGERY

## 2021-02-21 RX ORDER — FENTANYL CITRATE/PF 50 MCG/ML
25 SYRINGE (ML) INJECTION
Status: DISCONTINUED | OUTPATIENT
Start: 2021-02-21 | End: 2021-02-21 | Stop reason: HOSPADM

## 2021-02-21 RX ORDER — PROPOFOL 10 MG/ML
INJECTION, EMULSION INTRAVENOUS AS NEEDED
Status: DISCONTINUED | OUTPATIENT
Start: 2021-02-21 | End: 2021-02-21

## 2021-02-21 RX ORDER — HYDROMORPHONE HCL/PF 1 MG/ML
0.5 SYRINGE (ML) INJECTION EVERY 4 HOURS PRN
Status: DISCONTINUED | OUTPATIENT
Start: 2021-02-21 | End: 2021-02-22 | Stop reason: HOSPADM

## 2021-02-21 RX ORDER — ONDANSETRON 2 MG/ML
4 INJECTION INTRAMUSCULAR; INTRAVENOUS ONCE AS NEEDED
Status: DISCONTINUED | OUTPATIENT
Start: 2021-02-21 | End: 2021-02-21 | Stop reason: HOSPADM

## 2021-02-21 RX ORDER — SODIUM CHLORIDE 9 MG/ML
125 INJECTION, SOLUTION INTRAVENOUS CONTINUOUS
Status: DISCONTINUED | OUTPATIENT
Start: 2021-02-21 | End: 2021-02-22 | Stop reason: HOSPADM

## 2021-02-21 RX ORDER — ACETAMINOPHEN 325 MG/1
650 TABLET ORAL EVERY 6 HOURS PRN
Status: DISCONTINUED | OUTPATIENT
Start: 2021-02-21 | End: 2021-02-22 | Stop reason: HOSPADM

## 2021-02-21 RX ORDER — HYDROMORPHONE HCL/PF 1 MG/ML
SYRINGE (ML) INJECTION AS NEEDED
Status: DISCONTINUED | OUTPATIENT
Start: 2021-02-21 | End: 2021-02-21

## 2021-02-21 RX ORDER — ONDANSETRON 2 MG/ML
INJECTION INTRAMUSCULAR; INTRAVENOUS AS NEEDED
Status: DISCONTINUED | OUTPATIENT
Start: 2021-02-21 | End: 2021-02-21

## 2021-02-21 RX ORDER — BUPIVACAINE HYDROCHLORIDE AND EPINEPHRINE 5; 5 MG/ML; UG/ML
INJECTION, SOLUTION PERINEURAL AS NEEDED
Status: DISCONTINUED | OUTPATIENT
Start: 2021-02-21 | End: 2021-02-21 | Stop reason: HOSPADM

## 2021-02-21 RX ORDER — ROCURONIUM BROMIDE 10 MG/ML
INJECTION, SOLUTION INTRAVENOUS AS NEEDED
Status: DISCONTINUED | OUTPATIENT
Start: 2021-02-21 | End: 2021-02-21

## 2021-02-21 RX ORDER — MIDAZOLAM HYDROCHLORIDE 2 MG/2ML
INJECTION, SOLUTION INTRAMUSCULAR; INTRAVENOUS AS NEEDED
Status: DISCONTINUED | OUTPATIENT
Start: 2021-02-21 | End: 2021-02-21

## 2021-02-21 RX ORDER — SODIUM CHLORIDE 9 MG/ML
INJECTION, SOLUTION INTRAVENOUS AS NEEDED
Status: DISCONTINUED | OUTPATIENT
Start: 2021-02-21 | End: 2021-02-21 | Stop reason: HOSPADM

## 2021-02-21 RX ORDER — CEFAZOLIN SODIUM 2 G/50ML
2000 SOLUTION INTRAVENOUS ONCE
Status: COMPLETED | OUTPATIENT
Start: 2021-02-21 | End: 2021-02-21

## 2021-02-21 RX ORDER — GLYCOPYRROLATE 0.2 MG/ML
INJECTION INTRAMUSCULAR; INTRAVENOUS AS NEEDED
Status: DISCONTINUED | OUTPATIENT
Start: 2021-02-21 | End: 2021-02-21

## 2021-02-21 RX ORDER — SUCCINYLCHOLINE/SOD CL,ISO/PF 100 MG/5ML
SYRINGE (ML) INTRAVENOUS AS NEEDED
Status: DISCONTINUED | OUTPATIENT
Start: 2021-02-21 | End: 2021-02-21

## 2021-02-21 RX ORDER — OXYCODONE HYDROCHLORIDE 10 MG/1
10 TABLET ORAL EVERY 4 HOURS PRN
Status: DISCONTINUED | OUTPATIENT
Start: 2021-02-21 | End: 2021-02-22 | Stop reason: HOSPADM

## 2021-02-21 RX ORDER — SODIUM CHLORIDE, SODIUM LACTATE, POTASSIUM CHLORIDE, CALCIUM CHLORIDE 600; 310; 30; 20 MG/100ML; MG/100ML; MG/100ML; MG/100ML
INJECTION, SOLUTION INTRAVENOUS CONTINUOUS PRN
Status: DISCONTINUED | OUTPATIENT
Start: 2021-02-21 | End: 2021-02-21

## 2021-02-21 RX ORDER — CALCIUM CARBONATE 200(500)MG
500 TABLET,CHEWABLE ORAL DAILY PRN
Status: DISCONTINUED | OUTPATIENT
Start: 2021-02-21 | End: 2021-02-22 | Stop reason: HOSPADM

## 2021-02-21 RX ORDER — MAGNESIUM HYDROXIDE 1200 MG/15ML
LIQUID ORAL AS NEEDED
Status: DISCONTINUED | OUTPATIENT
Start: 2021-02-21 | End: 2021-02-21 | Stop reason: HOSPADM

## 2021-02-21 RX ORDER — FENTANYL CITRATE 50 UG/ML
INJECTION, SOLUTION INTRAMUSCULAR; INTRAVENOUS AS NEEDED
Status: DISCONTINUED | OUTPATIENT
Start: 2021-02-21 | End: 2021-02-21

## 2021-02-21 RX ORDER — OXYCODONE HYDROCHLORIDE 5 MG/1
5 TABLET ORAL EVERY 4 HOURS PRN
Status: DISCONTINUED | OUTPATIENT
Start: 2021-02-21 | End: 2021-02-22 | Stop reason: HOSPADM

## 2021-02-21 RX ORDER — DOCUSATE SODIUM 100 MG/1
100 CAPSULE, LIQUID FILLED ORAL 2 TIMES DAILY PRN
Status: DISCONTINUED | OUTPATIENT
Start: 2021-02-21 | End: 2021-02-22 | Stop reason: HOSPADM

## 2021-02-21 RX ORDER — DEXAMETHASONE SODIUM PHOSPHATE 4 MG/ML
INJECTION, SOLUTION INTRA-ARTICULAR; INTRALESIONAL; INTRAMUSCULAR; INTRAVENOUS; SOFT TISSUE AS NEEDED
Status: DISCONTINUED | OUTPATIENT
Start: 2021-02-21 | End: 2021-02-21

## 2021-02-21 RX ADMIN — SODIUM CHLORIDE, SODIUM LACTATE, POTASSIUM CHLORIDE, AND CALCIUM CHLORIDE: .6; .31; .03; .02 INJECTION, SOLUTION INTRAVENOUS at 05:22

## 2021-02-21 RX ADMIN — PHENYLEPHRINE HYDROCHLORIDE 100 MCG: 10 INJECTION INTRAVENOUS at 05:37

## 2021-02-21 RX ADMIN — HYDROMORPHONE HYDROCHLORIDE 0.5 MG: 1 INJECTION, SOLUTION INTRAMUSCULAR; INTRAVENOUS; SUBCUTANEOUS at 06:00

## 2021-02-21 RX ADMIN — OXYCODONE HYDROCHLORIDE 5 MG: 5 TABLET ORAL at 19:28

## 2021-02-21 RX ADMIN — MIDAZOLAM 2 MG: 1 INJECTION INTRAMUSCULAR; INTRAVENOUS at 05:22

## 2021-02-21 RX ADMIN — SODIUM CHLORIDE 1000 ML: 0.9 INJECTION, SOLUTION INTRAVENOUS at 02:20

## 2021-02-21 RX ADMIN — FENTANYL CITRATE 25 MCG: 50 INJECTION, SOLUTION INTRAMUSCULAR; INTRAVENOUS at 07:13

## 2021-02-21 RX ADMIN — FENTANYL CITRATE 50 MCG: 50 INJECTION, SOLUTION INTRAMUSCULAR; INTRAVENOUS at 05:28

## 2021-02-21 RX ADMIN — SODIUM CHLORIDE 125 ML/HR: 0.9 INJECTION, SOLUTION INTRAVENOUS at 08:41

## 2021-02-21 RX ADMIN — SODIUM CHLORIDE 125 ML/HR: 0.9 INJECTION, SOLUTION INTRAVENOUS at 16:28

## 2021-02-21 RX ADMIN — ROCURONIUM BROMIDE 40 MG: 10 INJECTION, SOLUTION INTRAVENOUS at 05:31

## 2021-02-21 RX ADMIN — IOHEXOL 100 ML: 350 INJECTION, SOLUTION INTRAVENOUS at 01:08

## 2021-02-21 RX ADMIN — CEFAZOLIN SODIUM 2000 MG: 2 SOLUTION INTRAVENOUS at 05:28

## 2021-02-21 RX ADMIN — ONDANSETRON 4 MG: 2 INJECTION INTRAMUSCULAR; INTRAVENOUS at 05:31

## 2021-02-21 RX ADMIN — ENOXAPARIN SODIUM 40 MG: 40 INJECTION SUBCUTANEOUS at 08:41

## 2021-02-21 RX ADMIN — FENTANYL CITRATE 25 MCG: 50 INJECTION, SOLUTION INTRAMUSCULAR; INTRAVENOUS at 06:57

## 2021-02-21 RX ADMIN — PROPOFOL 150 MG: 10 INJECTION, EMULSION INTRAVENOUS at 05:28

## 2021-02-21 RX ADMIN — Medication 100 MG: at 05:28

## 2021-02-21 RX ADMIN — SUGAMMADEX 250 MG: 100 INJECTION, SOLUTION INTRAVENOUS at 06:27

## 2021-02-21 RX ADMIN — PHENYLEPHRINE HYDROCHLORIDE 100 MCG: 10 INJECTION INTRAVENOUS at 05:34

## 2021-02-21 RX ADMIN — HYDROMORPHONE HYDROCHLORIDE 0.5 MG: 1 INJECTION, SOLUTION INTRAMUSCULAR; INTRAVENOUS; SUBCUTANEOUS at 05:41

## 2021-02-21 RX ADMIN — TOPICAL ANESTHETIC 2 SPRAY: 200 SPRAY DENTAL; PERIODONTAL at 02:44

## 2021-02-21 RX ADMIN — GLYCOPYRROLATE 0.2 MG: 0.2 INJECTION, SOLUTION INTRAMUSCULAR; INTRAVENOUS at 05:36

## 2021-02-21 RX ADMIN — DEXAMETHASONE SODIUM PHOSPHATE 4 MG: 4 INJECTION, SOLUTION INTRA-ARTICULAR; INTRALESIONAL; INTRAMUSCULAR; INTRAVENOUS; SOFT TISSUE at 05:31

## 2021-02-21 NOTE — H&P
H&P Exam - General Surgery   Tereza Rodriguez 63 y o  female MRN: [id number]  Unit/Bed#: ED 09 Encounter: [id number]    Assessment/Plan     Assessment:  62 yo F with closed loop small bowel obstruxction     Plan:  1  Closed loop small bowel obstruction   - Images reviewed personally and with radiologist   - closed loop bowel obstruction with mesenteric thickening and swirling appreciated in left lower quadrant   - discussed case with patient and her partner in depth  They understand the need for surgical evaluation, diagnostic laparoscopy possible exploratory laparotomy, possible lysis of adhesions, possible bowel resection   - all risks,benefits, alternatives were discussed with the patient including but not limited to:  Bleeding, infection, damage to surrounding structures including small bowel requiring need for additional surgeries in the future  Mi, DVT, PE  The patient is agreeable to proceeding with surgery and understands the risks    - NPO  -IVF  -NGT to continuous suction   - Admit to surgical service     History of Present Illness     HPI:  Tereza Rodriguez is a 63 y o  female who presents with onset of abdominal pain following her dinner with her partner celebrating their anniversary  The patient noted to have abdominal pain located in her left lower quadrant that became more severe in intensity as time went on  Associated with nausea and dry heaving, no vomiting  Patient has never had pain like this before  Patient's last bowel movement was yesterday  She was passing gas earlier this evening  She does report over the past month she has noted abdominal bloating and distention but never accompanied with pain  The patient's past medical history is significant for a pancreatic cyst which she has followed with Dr Jeremie Cote, surgical Oncology, in the past with surveillance  This is felt to be benign and has never given her a problem    Her last colonoscopy was 5 years ago was felt to be normal   Her next colonoscopy is scheduled for 5 years from now  Past surgical history is significant for a cyst excision approximately 20 years ago  This was attempted to be done laparoscopically she does have an incision at her umbilicus  However the surgeon had to convert to open  She has a low transverse incision  Patient is otherwise healthy  She is postmenopausal   She did report some asymptomatic vaginal bleeding which she has contacted her OB Gyne for evaluation  This has currently stopped  History of Lyme disease    REVIEW OF SYSTEMS  Constitutional:  Denies fever or chills   Eyes:  Denies change in visual acuity   HENT:  Denies nasal congestion or sore throat   Respiratory:  Denies cough or shortness of breath   Cardiovascular:  Denies chest pain or edema history of hypertension  GI:  Abdominal pain + dry heaving   :  Denies dysuria, frequency, difficulty in micturition and nocturia  Musculoskeletal:  Denies back pain   Neurologic:  Denies headache, focal weakness or sensory changes   Endocrine:  History of diabetes mellitus  Lymphatic:  Denies swollen glands   Psychiatric:  Denies depression or anxiety     Historical Information   Past Medical History:   Diagnosis Date    Lyme disease      History reviewed  No pertinent surgical history    Social History   Social History     Substance and Sexual Activity   Alcohol Use Yes    Comment: occasional     Social History     Substance and Sexual Activity   Drug Use No     Social History     Tobacco Use   Smoking Status Never Smoker   Smokeless Tobacco Never Used     E-Cigarette/Vaping    E-Cigarette Use Never User      E-Cigarette/Vaping Substances    Nicotine No     THC No     CBD No     Flavoring No     Other No     Unknown No      Family History: non-contributory    Meds/Allergies   all medications and allergies reviewed  No Known Allergies    Objective   First Vitals:   Blood Pressure: 92/60 (02/20/21 2346)  Pulse: 74 (02/20/21 2319)  Temperature: 98 1 °F (36 7 °C) (02/20/21 2319)  Temp Source: Tympanic (02/20/21 2319)  Respirations: 19 (02/20/21 2319)  SpO2: 98 % (02/20/21 2319)    Current Vitals:   Blood Pressure: 108/64 (02/21/21 0400)  Pulse: 78 (02/21/21 0400)  Temperature: 98 1 °F (36 7 °C) (02/20/21 2319)  Temp Source: Tympanic (02/20/21 2319)  Respirations: 14 (02/21/21 0400)  SpO2: 98 % (02/21/21 0400)      Intake/Output Summary (Last 24 hours) at 2/21/2021 0434  Last data filed at 2/21/2021 0421  Gross per 24 hour   Intake 2000 ml   Output --   Net 2000 ml       Invasive Devices     Peripheral Intravenous Line            Peripheral IV 02/20/21 Left Antecubital less than 1 day          Drain            NG/OG/Enteral Tube Nasogastric 16 Fr Right nares less than 1 day                Physical Exam:  Vital Signs: /64   Pulse 78   Temp 98 1 °F (36 7 °C) (Tympanic)   Resp 14   LMP 06/22/2016   SpO2 98%   Gen: No acute distress    Eyes: Extraocular motion intact, conjunctiva normal    Neck: Trachea midline, no thyromegaly, No JVD   Pulm: No increased work of breathing    Card:Regular rate    Abd: soft, tender to deep palpation left lower quadrant  No peritonitis  No rebound, no guarding  Previous umbilical incision well healed  Previous low transverse incision well healed  Ext: No cyanosis or edema bilateral    Neuro: Patient oriented to time and place   Psych: Appropriate affect, no obvious psychosis   Skin: No rashes   Rectal: deferred      Lab Results:   I have personally reviewed pertinent lab results    , CBC:   Lab Results   Component Value Date    WBC 7 52 02/20/2021    HGB 12 8 02/20/2021    HCT 41 4 02/20/2021    MCV 95 02/20/2021     02/20/2021    MCH 29 3 02/20/2021    MCHC 30 9 (L) 02/20/2021    RDW 13 2 02/20/2021    MPV 9 6 02/20/2021    NRBC 0 02/20/2021   , CMP:   Lab Results   Component Value Date    SODIUM 141 02/20/2021    K 3 5 02/20/2021     02/20/2021    CO2 30 02/20/2021    BUN 15 02/20/2021    CREATININE 0 77 02/20/2021    CALCIUM 8 6 02/20/2021    AST 20 02/20/2021    ALT 42 02/20/2021    ALKPHOS 70 02/20/2021    EGFR 87 02/20/2021     Imaging: I have personally reviewed pertinent reports  EKG, Pathology, and Other Studies: I have personally reviewed pertinent reports  Code Status: No Order  Advance Directive and Living Will:      Power of :    POLST:      Counseling / Coordination of Care  Total floor / unit time spent today 30 minutes  Greater than 50% of total time was spent with the patient and / or family counseling and / or coordination of care  A description of the counseling / coordination of care: 30

## 2021-02-21 NOTE — ED PROVIDER NOTES
History  Chief Complaint   Patient presents with    Abdominal Pain     Pt states L side ab + back pain started about 1hr ago  Nausea but denies vomit  Took 2 advil at home with no relief  HPI  63 year female that presents today left-sided abdominal pain along with back pain  Patient states she woke up from sleep with this pain  Patient states in her left area and now it is in the suprapubic area  Denies any hematuria frequency or urgency  Patient states she has never had this pain before  This took 2 Advil prior to arrival with no relief  Patient states she is very uncomfortable  64 yo F with left sided abdominal pain     Prior to Admission Medications   Prescriptions Last Dose Informant Patient Reported? Taking?    Calcium Carb-Cholecalciferol (CALCIUM 1000 + D) 1000-800 MG-UNIT TABS Not Taking at Unknown time Self Yes No   Sig: Take by mouth daily     Iron-Vitamin C (IRON 100/C) 100-250 MG TABS 2/21/2021 at Unknown time Self Yes Yes   Sig: Take by mouth once a week     LORazepam (ATIVAN) 0 5 mg tablet  Self No No   Sig: Take 1 tablet (0 5 mg total) by mouth as needed for anxiety (Launch) Take as need for dental work   Patient not taking: Reported on 2/11/2021   Multiple Vitamin (MULTI-VITAMIN DAILY) TABS 2/21/2021 at Unknown time Self Yes Yes   Sig: Take 1 tablet by mouth daily   Omega-3 Fatty Acids (FISH OIL) 645 MG CAPS Not Taking at Unknown time Self Yes No   Sig: Take by mouth once a week     guaifenesin-codeine (GUAIFENESIN AC) 100-10 MG/5ML liquid  Self No No   Sig: Take 5 mL by mouth 3 (three) times a day as needed for cough   Patient not taking: Reported on 1/16/2020   pramipexole (MIRAPEX) 0 25 mg tablet Past Month at Unknown time  No Yes   Sig: Take 1 tablet (0 25 mg total) by mouth daily at bedtime as needed (restless leg)   tobramycin-dexamethasone (TOBRADEX) ophthalmic suspension  Self No No   Sig: Administer 1 drop to the right eye every 4 (four) hours while awake   Patient not taking: Reported on 1/16/2020   tretinoin (RETIN-A) 0 025 % cream 2/20/2021 at Unknown time  No Yes   Sig: Apply thin film topically once daily      Facility-Administered Medications: None       Past Medical History:   Diagnosis Date    Lyme disease        Past Surgical History:   Procedure Laterality Date    MD LAP,DIAGNOSTIC ABDOMEN N/A 2/21/2021    Procedure: LAPAROSCOPY DIAGNOSTIC, exploratory laparotomy, lysis of adhesions, REDUCTION OF INTERNAL HERNIA;  Surgeon: Govind Lamb MD;  Location: [facility] MAIN OR;  Service: General       Family History   Problem Relation Age of Onset    Atrial fibrillation Mother     Hyperlipidemia Mother     Heart disease Father     Hypertension Father     Hyperlipidemia Father     Diabetes Father     Prostate cancer Maternal Grandfather 48    Breast cancer Maternal Aunt 28    Prostate cancer Maternal Uncle 61     I have reviewed and agree with the history as documented  E-Cigarette/Vaping    E-Cigarette Use Never User      E-Cigarette/Vaping Substances    Nicotine No     THC No     CBD No     Flavoring No     Other No     Unknown No      Social History     Tobacco Use    Smoking status: Never Smoker    Smokeless tobacco: Never Used   Substance Use Topics    Alcohol use: Yes     Comment: occasional    Drug use: No       Review of Systems   Constitutional: Negative  Negative for diaphoresis and fever  HENT: Negative  Respiratory: Negative  Negative for cough, shortness of breath and wheezing  Cardiovascular: Negative  Negative for chest pain, palpitations and leg swelling  Gastrointestinal: Positive for abdominal pain, nausea and vomiting  Negative for abdominal distention  Genitourinary: Negative  Musculoskeletal: Negative  Skin: Negative  Neurological: Negative  Psychiatric/Behavioral: Negative  All other systems reviewed and are negative  Physical Exam  Physical Exam  Constitutional:       Appearance: She is well-developed     HENT: Head: Normocephalic and atraumatic  Eyes:      Pupils: Pupils are equal, round, and reactive to light  Neck:      Musculoskeletal: Normal range of motion and neck supple  Cardiovascular:      Rate and Rhythm: Normal rate and regular rhythm  Heart sounds: Normal heart sounds  No murmur  Pulmonary:      Effort: Pulmonary effort is normal       Breath sounds: Normal breath sounds  Abdominal:      General: Bowel sounds are normal  There is no distension  Palpations: Abdomen is soft  Tenderness: There is generalized abdominal tenderness  There is no guarding or rebound  Musculoskeletal: Normal range of motion  Skin:     General: Skin is warm and dry  Neurological:      Mental Status: She is alert and oriented to person, place, and time           Vital Signs  ED Triage Vitals   Temperature Pulse Respirations Blood Pressure SpO2   02/20/21 2319 02/20/21 2319 02/20/21 2319 02/20/21 2346 02/20/21 2319   98 1 °F (36 7 °C) 74 19 92/60 98 %      Temp Source Heart Rate Source Patient Position - Orthostatic VS BP Location FiO2 (%)   02/20/21 2319 02/20/21 2319 02/20/21 2319 02/20/21 2319 --   Tympanic Monitor Lying Right arm       Pain Score       02/20/21 2343       Worst Possible Pain           Vitals:    02/21/21 1938 02/21/21 2242 02/22/21 0358 02/22/21 1206   BP: 98/58 100/61 117/77 140/80   Pulse: 96 80 72 71   Patient Position - Orthostatic VS:             Visual Acuity      ED Medications  Medications   ondansetron (ZOFRAN) injection 4 mg (4 mg Intravenous Given 2/20/21 2344)   sodium chloride 0 9 % bolus 1,000 mL (0 mL Intravenous Stopped 2/21/21 0121)   ketorolac (TORADOL) injection 15 mg (15 mg Intravenous Given 2/20/21 2343)   morphine (PF) 4 mg/mL injection 4 mg (4 mg Intravenous Given 2/20/21 2344)   iohexol (OMNIPAQUE) 350 MG/ML injection (SINGLE-DOSE) 100 mL (100 mL Intravenous Given 2/21/21 0108)   sodium chloride 0 9 % bolus 1,000 mL (0 mL Intravenous Stopped 2/21/21 0421) benzocaine (HURRICAINE) 20 % mucosal spray 2 spray (2 sprays Mucosal Given 2/21/21 0244)   ceFAZolin (ANCEF) IVPB (premix in dextrose) 2,000 mg 50 mL ( Intravenous MAR Unhold 2/21/21 0634)       Diagnostic Studies  Results Reviewed     Procedure Component Value Units Date/Time    COVID19, Influenza A/B, RSV PCR, [facility] [[id number]]  (Normal) Collected: 02/21/21 0240    Lab Status: Final result Specimen: Nares from Nasopharyngeal Swab Updated: 02/21/21 0330     SARS-CoV-2 Negative     INFLUENZA A PCR Negative     INFLUENZA B PCR Negative     RSV PCR Negative    Narrative: This test has been authorized by FDA under an EUA (Emergency Use Assay) for use by authorized laboratories  Clinical caution and judgement should be used with the interpretation of these results with consideration of the clinical impression and other laboratory testing  Testing reported as "Positive" or "Negative" has been proven to be accurate according to standard laboratory validation requirements  All testing is performed with control materials showing appropriate reactivity at standard intervals      Comprehensive metabolic panel [[id number]]  (Abnormal) Collected: 02/20/21 2337    Lab Status: Final result Specimen: Blood from Arm, Left Updated: 02/21/21 0004     Sodium 141 mmol/L      Potassium 3 5 mmol/L      Chloride 104 mmol/L      CO2 30 mmol/L      ANION GAP 7 mmol/L      BUN 15 mg/dL      Creatinine 0 77 mg/dL      Glucose 128 mg/dL      Calcium 8 6 mg/dL      AST 20 U/L      ALT 42 U/L      Alkaline Phosphatase 70 U/L      Total Protein 7 4 g/dL      Albumin 3 5 g/dL      Total Bilirubin 0 10 mg/dL      eGFR 87 ml/min/1 73sq m     Narrative:      [place] guidelines for Chronic Kidney Disease (CKD):     Stage 1 with normal or high GFR (GFR > 90 mL/min/1 73 square meters)    Stage 2 Mild CKD (GFR = 60-89 mL/min/1 73 square meters)    Stage 3A Moderate CKD (GFR = 45-59 mL/min/1 73 square meters)   Stage 3B Moderate CKD (GFR = 30-44 mL/min/1 73 square meters)    Stage 4 Severe CKD (GFR = 15-29 mL/min/1 73 square meters)    Stage 5 End Stage CKD (GFR <15 mL/min/1 73 square meters)  Note: GFR calculation is accurate only with a steady state creatinine    Lipase [[id number]]  (Normal) Collected: 02/20/21 2337    Lab Status: Final result Specimen: Blood from Arm, Left Updated: 02/21/21 0004     Lipase 149 u/L     CBC and differential [[id number]]  (Abnormal) Collected: 02/20/21 2337    Lab Status: Final result Specimen: Blood from Arm, Left Updated: 02/20/21 2342     WBC 7 52 Thousand/uL      RBC 4 37 Million/uL      Hemoglobin 12 8 g/dL      Hematocrit 41 4 %      MCV 95 fL      MCH 29 3 pg      MCHC 30 9 g/dL      RDW 13 2 %      MPV 9 6 fL      Platelets 322 Thousands/uL      nRBC 0 /100 WBCs      Neutrophils Relative 55 %      Immat GRANS % 0 %      Lymphocytes Relative 35 %      Monocytes Relative 7 %      Eosinophils Relative 2 %      Basophils Relative 1 %      Neutrophils Absolute 4 10 Thousands/µL      Immature Grans Absolute 0 03 Thousand/uL      Lymphocytes Absolute 2 62 Thousands/µL      Monocytes Absolute 0 54 Thousand/µL      Eosinophils Absolute 0 16 Thousand/µL      Basophils Absolute 0 07 Thousands/µL                  CT abdomen pelvis with contrast   Final Result by Romeo Jacinto MD (02/21 0201)      Dilated bowel loops and mesenteric edema with mesentery emanating from a focal point indicating  a small bowel closed loop obstruction secondary to an internal hernia  1 6 cm left renal cystic lesion which does not meet CT criteria for simple cyst on this examination; recommend elective ultrasound or MRI abdomen with contrast, renal protocol, for further evaluation                  I personally discussed this study with Lazarus Oliphant on 2/21/2021 at 1:59 AM                Workstation performed: [id number]                    Procedures  Procedures         ED Course  ED Course as of Feb 23 0258   Dileep Chandler Feb 21, 2021   0202 Dilated bowel loops and mesenteric edema with mesentery emanating from a focal point indicating  a small bowel closed loop obstruction secondary to an internal hernia         0203 Page surgery                                              MDM  Number of Diagnoses or Management Options  Bowel obstruction [facility]):   Diagnosis management comments: 64 yo F with abdoinal pain  Found to have obstruction  Admitted to surgery who took patient to the OR  NG tube placed  Patient was comfortable with pain medication  Disposition  Final diagnoses: Bowel obstruction ([address] )     Time reflects when diagnosis was documented in both MDM as applicable and the Disposition within this note     Time User Action Codes Description Comment    2/21/2021  2:37 AM Cheli Petey Add [M16 546] Bowel obstruction ([address] )     2/21/2021  6:07 AM Donta Brasher Modify [C24 121] Bowel obstruction ([address] )     2/22/2021  8:37 AM Martha Pleitez Modify [F65 860] Bowel obstruction ([address] )     2/22/2021  8:37 AM Martha Pleitez Add [C58 337] Small bowel obstruction [facility])       ED Disposition     ED Disposition Condition Date/Time Comment    Admit Stable Sun Feb 21, 2021  2:37 AM Case was discussed with general surgery and the patient's admission status was agreed to be Admission Status: inpatient status to the service of Dr Olya Regan   Follow-up Information     Follow up With Specialties Details Why [address] Rom[address] Heller MD General Surgery Follow up Please call schedule appointment 10-14 days after surgery   [phone]  [address] [place] [address]  [phone]            Discharge Medication List as of 2/22/2021  7:36 PM      START taking these medications    Details   oxyCODONE-acetaminophen (PERCOCET) 5-325 mg per tablet Take 1 tablet by mouth every 6 (six) hours as needed for severe pain for up to 10 dosesMax Daily Amount: 4 tablets, Starting Mon 2/22/2021, Normal         CONTINUE these medications which have NOT CHANGED    Details   Iron-Vitamin C (IRON 100/C) 100-250 MG TABS Take by mouth once a week  , Historical Med      Multiple Vitamin (MULTI-VITAMIN DAILY) TABS Take 1 tablet by mouth daily, Historical Med      pramipexole (MIRAPEX) 0 25 mg tablet Take 1 tablet (0 25 mg total) by mouth daily at bedtime as needed (restless leg), Starting Thu 2/11/2021, Normal      tretinoin (RETIN-A) 0 025 % cream Apply thin film topically once daily, Normal      Calcium Carb-Cholecalciferol (CALCIUM 1000 + D) 1000-800 MG-UNIT TABS Take by mouth daily  , Historical Med      guaifenesin-codeine (GUAIFENESIN AC) 100-10 MG/5ML liquid Take 5 mL by mouth 3 (three) times a day as needed for cough, Starting Thu 10/11/2018, Normal      LORazepam (ATIVAN) 0 5 mg tablet Take 1 tablet (0 5 mg total) by mouth as needed for anxiety (Launch) Take as need for dental work, Starting Wed 10/31/2018, Normal      Omega-3 Fatty Acids (FISH OIL) 645 MG CAPS Take by mouth once a week  , Historical Med      tobramycin-dexamethasone (TOBRADEX) ophthalmic suspension Administer 1 drop to the right eye every 4 (four) hours while awake, Starting Wed 5/23/2018, Normal           Outpatient Discharge Orders   Lifting restrictions     Call provider for:  persistent nausea or vomiting     Call provider for:  severe uncontrolled pain     Call provider for:  redness, tenderness, or signs of infection (pain, swelling, redness, odor or green/yellow discharge around incision site)     Call provider for: active or persistent bleeding     Call provider for:  difficulty breathing, headache or visual disturbances     Call provider for:  hives     Call provider for:  persistent dizziness or light-headedness     Call provider for:  extreme fatigue       PDMP Review       Value Time User    PDMP Reviewed  Yes 2/21/2021  6:43 AM Minus Left, ENRIKE          ED Provider  Electronically Signed by           Jamal Foley MD  02/23/21 7552

## 2021-02-21 NOTE — PLAN OF CARE
Problem: PAIN - ADULT  Goal: Verbalizes/displays adequate comfort level or baseline comfort level  Description: Interventions:  - Encourage patient to monitor pain and request assistance  - Assess pain using appropriate pain scale  - Administer analgesics based on type and severity of pain and evaluate response  - Implement non-pharmacological measures as appropriate and evaluate response  - Consider cultural and social influences on pain and pain management  - Notify physician/advanced practitioner if interventions unsuccessful or patient reports new pain  Outcome: Progressing     Problem: INFECTION - ADULT  Goal: Absence or prevention of progression during hospitalization  Description: INTERVENTIONS:  - Assess and monitor for signs and symptoms of infection  - Monitor lab/diagnostic results  - Monitor all insertion sites, i e  IV site, surgical site   - [place] appropriate cooling/warming therapies per order  - Administer medications as ordered  - Instruct and encourage patient and family to use good hand hygiene technique  - Identify and instruct in appropriate isolation precautions for identified infection/condition  Outcome: Progressing     Problem: SKIN/TISSUE INTEGRITY - ADULT  Goal: Incision(s), wounds(s) or drain site(s) healing without S/S of infection  Description: INTERVENTIONS  - Assess and document risk factors for skin impairment   - Assess and document dressing, incision, wound bed, drain sites and surrounding tissue  - Consider nutrition services referral as needed  - Oral mucous membranes remain intact  - Provide patient/ family education  Outcome: Progressing

## 2021-02-21 NOTE — QUICK NOTE
Patient seen post operative from diagnostic laparoscopy, lysis of adhesions, reduction of internal hernia     Doing well , Tolerated diet   No abdominal pain   No flatus or bm     Plan to keep patient in hospital overnight   Diet as tolerated   Pain control prn   Plan to DC tomorrow

## 2021-02-21 NOTE — INTERIM OP NOTE
LAPAROSCOPY DIAGNOSTIC, exploratory laparotomy, lysis of adhesions, REDUCTION OF INTERNAL HERNIA  Postoperative Note  PATIENT NAME: Aleksandar Cleveland  : 1965  MRN: [id number]  [facility] OR ROOM 01    Surgery Date: 2021    Preop Diagnosis:  Bowel obstruction ([facility] ) [O22 663]    Post-Op Diagnosis Codes:      * Bowel obstruction ([facility] [address] ) [K56 609]    Procedure(s) (LRB):  LAPAROSCOPY DIAGNOSTIC, exploratory laparotomy, lysis of adhesions, REDUCTION OF INTERNAL HERNIA (N/A)    Surgeon(s) and Role:     * Kiley Bravo MD - Primary     * Hadley Lopez PA-C - Assisting    Specimens:  ID Type Source Tests Collected by Time Destination   A :  Body Fluid Abdominal ANAEROBIC CULTURE AND GRAM STAIN, BODY FLUID CULTURE AND GRAM STAIN Kiley Bravo MD 2021  6:06 AM        Estimated Blood Loss:   Minimal    Anesthesia Type:   General     Findings:   Adhesive band from Left ovary to omentum from sigmoid causing internal hernia and closed loop bowel obstruction     Bowel viable, reduced with gentle traction     Adhesions lysed     Small bowel inspected from ligament of treitz to terminal ileum, normal appendix visualized    Chylous appearing fluid in abdomen sent for culture                       Complications:   None    SIGNATURE: Kiley Bravo MD   DATE: 2021   TIME: 6:58 AM

## 2021-02-21 NOTE — ANESTHESIA PREPROCEDURE EVALUATION
Procedure:  LAPAROSCOPY DIAGNOSTIC, possible exploratory laparotomy, possible lysis of adhesions, possible bowel resection (N/A Abdomen)    Relevant Problems   GI/HEPATIC   (+) Cyst of pancreas      NEURO/PSYCH   (+) Situational anxiety        Physical Exam    Airway    Mallampati score: II  TM Distance: >3 FB  Neck ROM: full     Dental   No notable dental hx     Cardiovascular  Cardiovascular exam normal    Pulmonary      Other Findings        Anesthesia Plan  ASA Score- 2 Emergent    Anesthesia Type- general with ASA Monitors  Additional Monitors:   Airway Plan: ETT  Plan Factors-    Chart reviewed  Imaging results reviewed  Patient summary reviewed  Patient is not a current smoker  Induction- intravenous  Postoperative Plan- Plan for postoperative opioid use  Informed Consent- Anesthetic plan and risks discussed with patient  I personally reviewed this patient with the CRNA  Discussed and agreed on the Anesthesia Plan with the CRNA  Sami Jeffrey

## 2021-02-21 NOTE — OP NOTE
OPERATIVE REPORT  PATIENT NAME: Meir Glover    :  1965  MRN: [id number]  Pt Location: [facility] OR ROOM 01    SURGERY DATE: 2021    Surgeon(s) and Role:     * Riccardo Hanson MD - Primary     * Kelli Davis PA-C - Assisting    Preop Diagnosis:  Bowel obstruction ([address] ) [S89 523]    Post-Op Diagnosis Codes: * Bowel obstruction ([address] ) [K56 609]    Procedure(s) (LRB):  LAPAROSCOPY DIAGNOSTIC, exploratory laparotomy, lysis of adhesions, REDUCTION OF INTERNAL HERNIA (N/A)    Specimen(s):  ID Type Source Tests Collected by Time Destination   A :  Body Fluid Abdominal ANAEROBIC CULTURE AND GRAM STAIN, BODY FLUID CULTURE AND GRAM STAIN Riccardo Hanson MD 2021  6:06 AM        Estimated Blood Loss:   Minimal    Drains:  [REMOVED] NG/OG/Enteral Tube Nasogastric 16 Fr Right nares (Removed)   Placement Reverification Auscultation 21 0308   Site Assessment Clean 21 0308   Status Suction-low intermittent 21 0308   Drainage Appearance Bloody 21 0308   Number of days: 0       Anesthesia Type:   General    Operative Indications: Bowel obstruction ([address] ) [K56 609]      Operative Findings:  Adhesions from left ovary to sigmoid colon mesentery causing internal hernia and closed loop obstruction     Bowel viable, reduced with gentle traction      Adhesions lysed      Small bowel inspected from ligament of treitz to terminal ileum, normal appendix visualized     Chylous appearing fluid in abdomen sent for culture                               Complications:   None    Procedure and Technique:  Patient was seen in the emergency department secondary to severe abdominal pain and CT scan with evidence of closed loop bowel obstruction  Results of the scan were reviewed with patient  It was discussed that she would need diagnostic laparoscopy and possible exploratory laparotomy  She was agreeable to proceeding and understood all risks and benefits     Patient was taken to the operating room and identified by name and birthdate  Next, she was placed on the operating room table, compression devices were placed on bilateral lower extremities, antibiotics were given and general anesthesia was achieved  The abdomen was draped and prepped in the usual sterile fashion  Next, a veress needle was inserted at palmers point with an opening pressure of 4mmHg  Pneumoperitoneum to a pressure of 15mmHg was obtained without a change in the patients vital signs  Next, additional 5mm ports were placed on the patients right side of her abdomen  Her bowel was inspected and found to have dilated loops of small bowel  The ligament of treitz was identified and the bowel was run distally  At one point the bowel was seen going through an adhesion and hole of mesentary  This was completely reduced and it appeared to be a hole in the [place]  Upon closer review it was noted that the ovary was adhered to the mesentary and was displaced more medially and was causing a closed loop obstruction  This adhesions was taken with the harmonic scalpel  The chylous white fluid was sent for culture  The remaining fluid was evacuated from the abdomen  The rest of the small bowel was run to the terminal ileum  A normal appendix was visualized  At this point ports were removed from the abdomen once pneumoperitoneum was resolved  Port sites were closed with 4-0 monocryl and exofin  The patient tolerated the procedure without complication  She was extubated and brought to pacu in stable condition     Kacy Quintana was present and scrubbed for the entire case to assist with camera  A qualified resident was not available     I was present and scrubbed for the entire case        Patient Disposition:  PACU     SIGNATURE: Edilberto Olvera MD  DATE: February 21, 2021  TIME: 1:08 PM

## 2021-02-22 VITALS
SYSTOLIC BLOOD PRESSURE: 140 MMHG | BODY MASS INDEX: 29.05 KG/M2 | HEART RATE: 71 BPM | RESPIRATION RATE: 17 BRPM | WEIGHT: 180.78 LBS | OXYGEN SATURATION: 96 % | DIASTOLIC BLOOD PRESSURE: 80 MMHG | HEIGHT: 66 IN | TEMPERATURE: 98.5 F

## 2021-02-22 LAB — RYE IGE QN: NEGATIVE

## 2021-02-22 PROCEDURE — 99024 POSTOP FOLLOW-UP VISIT: CPT | Performed by: SPECIALIST

## 2021-02-22 PROCEDURE — 97162 PT EVAL MOD COMPLEX 30 MIN: CPT

## 2021-02-22 RX ORDER — OXYCODONE HYDROCHLORIDE AND ACETAMINOPHEN 5; 325 MG/1; MG/1
1 TABLET ORAL EVERY 6 HOURS PRN
Qty: 10 TABLET | Refills: 0 | Status: SHIPPED | OUTPATIENT
Start: 2021-02-22 | End: 2022-02-03

## 2021-02-22 RX ADMIN — OXYCODONE HYDROCHLORIDE 10 MG: 10 TABLET ORAL at 19:33

## 2021-02-22 RX ADMIN — ENOXAPARIN SODIUM 40 MG: 40 INJECTION SUBCUTANEOUS at 12:31

## 2021-02-22 RX ADMIN — SODIUM CHLORIDE 125 ML/HR: 0.9 INJECTION, SOLUTION INTRAVENOUS at 03:13

## 2021-02-22 RX ADMIN — OXYCODONE HYDROCHLORIDE 5 MG: 5 TABLET ORAL at 03:13

## 2021-02-22 RX ADMIN — OXYCODONE HYDROCHLORIDE 5 MG: 5 TABLET ORAL at 12:31

## 2021-02-22 NOTE — DISCHARGE SUMMARY
Discharge Summary - Tereza Rodriguez 63 y o  female MRN: [id number]    Unit/Bed#: [address] [place] [address] Encounter: [id number]    Admission Date: 2/20/2021   Discharge Date: 02/22/21    Admitting Diagnosis:   Bowel obstruction ([address] ) [K56 609]  Abdominal pain [R10 9]    Discharge Diagnoses: Principal Problem:    Small bowel obstruction [facility])      Consultations: NA    Procedures Performed:     Hospital Course: Tereza Rodriguez is a 63 y o  female admitted for small bowel obstruction  Patient presented with abdominal pain after eating dinner  Ct scan showed:Dilated bowel loops and mesenteric edema with mesentery emanating from a focal point indicating  a small bowel closed loop obstruction secondary to an internal hernia  Patient was then take to the OR for exploratory laparotomy, lysis of adhesions and reduction of internal hernia  Intraoperative findings: adhesions from left ovary to sigmoid colon causing internal hernia and closed loop obstruction  POD# 1 patient is tolerating regular diet, passing flatus and abdominal pain well controlled  Dicussed with patient discharge instructions, wound care, follow up, and when to follow up sooner at length  All questions answered to satisfactory  Patient verbalizes understanding and agrees with treatment plan  Condition at Discharge: good     Discharge instructions/Information to patient and family:   See after visit summary for information provided to patient and family  Provisions for Follow-Up Care:  See after visit summary for information related to follow-up care and any pertinent home health orders  Disposition: See After Visit Summary for discharge disposition information  Planned Readmission: No    Discharge Statement   I spent 60 minutes discharging the patient  This time was spent on the day of discharge  I had direct contact with the patient on the day of discharge  Additional documentation is required if more than 30 minutes were spent on discharge  Discharge Medications:  See after visit summary for reconciled discharge medications provided to patient and family

## 2021-02-22 NOTE — UTILIZATION REVIEW
Initial Clinical Review    Admission: Date/Time/Statement:   Admission Orders (From admission, onward)     Ordered        02/21/21 0446  Inpatient Admission  Once                   Orders Placed This Encounter   Procedures    Inpatient Admission     Standing Status:   Standing     Number of Occurrences:   1     Order Specific Question:   Level of Care     Answer:   Med Surg [16]     Order Specific Question:   Estimated length of stay     Answer:   More than 2 Midnights     Order Specific Question:   Certification     Answer:   I certify that inpatient services are medically necessary for this patient for a duration of greater than two midnights  See H&P and MD Progress Notes for additional information about the patient's course of treatment  ED Arrival Information     Expected Arrival Acuity Means of Arrival Escorted By Service Admission Type    - 2/20/2021 23:12 Urgent Walk-In Family [facility] Surgery-General Urgent    Arrival Complaint    Abdominal Pain        Chief Complaint   Patient presents with    Abdominal Pain     Pt states L side ab + back pain started about 1hr ago  Nausea but denies vomit  Took 2 advil at home with no relief  Assessment/Plan: 64 yo fem w/hx lyme dz, pancreatic cyst s/p open removal,  to ED from home admitted as inpatient on the surgical service due to small bowel obstruction  Presented after sharp LLQ began associated with nausea & dry heaves after eating dinner  Exam reveals abd soft and tender to deep palp LLQ  Imaging showed bowel obstruction  NPO, NG placed to continuous suction, IVF in progress   Plan for OR      2/21 @4874 to OR:  LAPAROSCOPY DIAGNOSTIC, lysis of adhesions, REDUCTION OF INTERNAL HERNIA   General anesthesia  Operative findings: Adhesions from left ovary to sigmoid colon mesentery causing internal hernia and closed loop obstruction    Bowel viable, reduced with gentle traction    Adhesions lysed    Small bowel inspected from ligament of treitz to terminal ileum, normal appendix visualized   Chylous appearing fluid in abdomen sent for culture   Narrative: veress needle was inserted at palmers point with an opening pressure of 4mmHg  Pneumoperitoneum to a pressure of 15mmHg was obtained without a change in the patients vital signs  Next, additional 5mm ports were placed on the patients right side of her abdomen  Her bowel was inspected and found to have dilated loops of small bowel  The ligament of treitz was identified and the bowel was run distally  At one point the bowel was seen going through an adhesion and hole of mesentary  This was completely reduced and it appeared to be a hole in the [place]  Upon closer review it was noted that the ovary was adhered to the mesentary and was displaced more medially and was causing a closed loop obstruction  This adhesions was taken with the harmonic scalpel  The chylous white fluid was sent for culture  The remaining fluid was evacuated from the abdomen  The rest of the small bowel was run to the terminal ileum  A normal appendix was visualized  At this point ports were removed from the abdomen once pneumoperitoneum was resolved  2/21 PM Update: NG removed this AM postop; tolerating diet, pain controlled  Normal bowel sounds, abdomen is soft and tender  2/22: voiding clear yellow urine       ED Triage Vitals   Temperature Pulse Respirations Blood Pressure SpO2   02/20/21 2319 02/20/21 2319 02/20/21 2319 02/20/21 2346 02/20/21 2319   98 1 °F (36 7 °C) 74 19 92/60 98 %      Temp Source Heart Rate Source Patient Position - Orthostatic VS BP Location FiO2 (%)   02/20/21 2319 02/20/21 2319 02/20/21 2319 02/20/21 2319 --   Tympanic Monitor Lying Right arm       Pain Score       02/20/21 2343       Worst Possible Pain          Wt Readings from Last 1 Encounters:   02/22/21 82 kg (180 lb 12 4 oz)     Additional Vital Signs:   Date/Time  Temp  Pulse  Resp  BP  MAP (mmHg)  SpO2  O2 Flow Rate (L/min)  O2 Device     02/22/21 03:58:56  98 2 °F (36 8 °C)  72  20  117/77  90  94 %  --  --     02/21/21 22:42:50  98 9 °F (37 2 °C)  80  19  100/61  74  96 %  --  --     02/21/21 2000  --  --  --  --  --  --  --  None (Room air)     02/21/21 1938  100 1 °F (37 8 °C)  96  18  98/58  71  96 %  --  --     02/21/21 15:01:47  98 6 °F (37 °C)  79  18  96/58  71  98 %  --  --     02/21/21 13:14:50  99 2 °F (37 3 °C)  89  --  103/65  78  97 %  --  --     02/21/21 0809  98 °F (36 7 °C)  73  18  97/61  --  97 %  --  None (Room air)     02/21/21 0742  97 5 °F (36 4 °C)  64  16  98/53  --  100 %  5 L/min  Simple mask     02/21/21 0730  --  64  16  100/59  --  100 %  5 L/min  Simple mask     02/21/21 0715  --  62  16  108/62  --  100 %  5 L/min  Simple mask     02/21/21 0700  --  62  16  101/60  --  100 %  5 L/min  Simple mask     02/21/21 0657  --  --  --  --  --  100 %  5 L/min  Simple mask     02/21/21 0645  --  80  16  94/53  --  100 %  5 L/min  Simple mask     02/21/21 0635  97 5 °F (36 4 °C)  86  16  98/59  --  100 %  5 L/min  Simple mask     02/21/21 0400  --  78  14  108/64  79  98 %  --  --     02/21/21 0220  --  81  15  96/52  --  100 %  --  None (Room air)         Pertinent Labs/Diagnostic Test Results:   2/21 CT a&p: Dilated bowel loops and mesenteric edema with mesentery emanating from a focal point indicating  a small bowel closed loop obstruction secondary to an internal hernia    1 6 cm left renal cystic lesion which does not meet CT criteria for simple cyst on this examination; recommend elective ultrasound or MRI abdomen with contrast, renal protocol, for further evaluation      Results from last 7 days   Lab Units 02/21/21  0240   SARS-COV-2  Negative     Results from last 7 days   Lab Units 02/20/21  2337   WBC Thousand/uL 7 52   HEMOGLOBIN g/dL 12 8   HEMATOCRIT % 41 4   PLATELETS Thousands/uL 327   NEUTROS ABS Thousands/µL 4 10         Results from last 7 days   Lab Units 02/20/21  2337   SODIUM mmol/L 141   POTASSIUM mmol/L 3 5 CHLORIDE mmol/L 104   CO2 mmol/L 30   ANION GAP mmol/L 7   BUN mg/dL 15   CREATININE mg/dL 0 77   EGFR ml/min/1 73sq m 87   CALCIUM mg/dL 8 6     Results from last 7 days   Lab Units 02/20/21  2337   AST U/L 20   ALT U/L 42   ALK PHOS U/L 70   TOTAL PROTEIN g/dL 7 4   ALBUMIN g/dL 3 5   TOTAL BILIRUBIN mg/dL 0 10*     Results from last 7 days   Lab Units 02/21/21  0654   POC GLUCOSE mg/dl 111     Results from last 7 days   Lab Units 02/20/21  2337   GLUCOSE RANDOM mg/dL 128     Results from last 7 days   Lab Units 02/20/21  2337   LIPASE u/L 149       Results from last 7 days   Lab Units 02/21/21  0240   INFLUENZA A PCR  Negative   INFLUENZA B PCR  Negative   RSV PCR  Negative       Results from last 7 days   Lab Units 02/21/21  0606   BODY FLUID CULTURE, STERILE  No growth     ED Treatment:   Medication Administration from 02/20/2021 2312 to 02/21/2021 8497       Date/Time Order Dose Route Action     02/20/2021 2344 ondansetron (ZOFRAN) injection 4 mg 4 mg Intravenous Given     02/20/2021 2343 sodium chloride 0 9 % bolus 1,000 mL 1,000 mL Intravenous New Bag     02/20/2021 2343 ketorolac (TORADOL) injection 15 mg 15 mg Intravenous Given     02/20/2021 2344 morphine (PF) 4 mg/mL injection 4 mg 4 mg Intravenous Given     02/21/2021 0220 sodium chloride 0 9 % bolus 1,000 mL 1,000 mL Intravenous New Bag     02/21/2021 0244 benzocaine (HURRICAINE) 20 % mucosal spray 2 spray 2 spray Mucosal Given        Past Medical History:   Diagnosis Date    Lyme disease        Admitting Diagnosis: Bowel obstruction ([facility] [address] ) [K56 609]  Abdominal pain [R10 9]  Age/Sex: 63 y o  female  Admission Orders:  Scheduled Medications:  enoxaparin, 40 mg, Subcutaneous, Daily    ceFAZolin (ANCEF) IVPB (premix in dextrose) 2,000 mg 50 mL   Dose: 2,000 mg  Freq:  Once Route: IV  Start: 02/21/21 0500 End: 02/21/21 0528    NS 1li bolus 2/21 @0220    Continuous IV Infusions:  sodium chloride, 125 mL/hr, Intravenous, Continuous      PRN Meds:  acetaminophen, 650 mg, Oral, Q6H PRN  calcium carbonate, 500 mg, Oral, Daily PRN  docusate sodium, 100 mg, Oral, BID PRN  HYDROmorphone, 0 5 mg, Intravenous, Q4H PRN  oxyCODONE, 10 mg, Oral, Q4H PRN  oxyCODONE, 5 mg, Oral, Q4H PRN x 1 2/21, x 1 2/22      SCD's  NPO, adv to [facility] Utilization Review Department  ATTENTION: Please call with any questions or concerns to [phone] and carefully listen to the prompts so that you are directed to the right person  All voicemails are confidential   Finis Feeling all requests for admission clinical reviews, approved or denied determinations and any other requests to dedicated fax number below belonging to the campus where the patient is receiving treatment   List of dedicated fax numbers for the Facilities:  [address] [place] [address] DENIALS (Administrative/Medical Necessity) [phone]   [address] (Maternity/NICU/Pediatrics) [phone]   [address] [place] [address] Dr Meredith [facility] Maikel Diez [address] ([address]) [address] [place] [address] Marissa Diana [address] [facility]) [address] [place] [address] [phone]

## 2021-02-22 NOTE — DISCHARGE INSTRUCTIONS
Post-Operative Care Instructions      1  General: You may feel pulling sensations around the wound or funny aches and pains around the incisions  This is normal  Even minor surgery is a change in your body and this is your body's reaction to it  If you have had abdominal surgery, it may help to support the incision with a small pillow or blanket for comfort when moving or coughing  2  Wound care: The glue over the incisions will fall off over the next week or two  If you have staples or stitches, they will be removed by the physician at your follow up appointment  3  Showering: You may shower  Do not soak wound in a bath, hot tub, pool, lake, etc  Do not scrub or use exfoliants on the surgical wounds  4  Activity: You may go up and down stairs, walk as much as you are comfortable, but walk at least 3 times each day  If you have had abdominal surgery, do not perform any strenuous exercise or lift anything heavier than 10-15 pounds for at least 3 weeks, unless cleared by your physician  5  Diet: You may resume your regular diet  6  Medications: Resume all of your previous medications, unless told otherwise by the doctor  A good option for pain control is to start with acetaminophen(Tylenol) 650mg and ibuprofen(Advil) 400mg and alternate taking them every 2 hours  If this is not sufficient then you make take the narcotic pain medicine as prescribed  Insure that you do not take more than 4000 mg of Tylenol per day  You do not need to take the narcotic pain medication unless you are having significant pain and discomfort  7  Driving: You will need someone to drive you home on the day of surgery  Do not drive or make any important decisions while on narcotic pain medication or for up to 24 hours after anesthesia for surgery  Generally, you may drive when you're off all narcotic pain medications  8  Upset Stomach: You may take Maalox, Tums, or similar items for an upset stomach   If your narcotic pain medication causes an upset stomach, do not take it on an empty stomach  Try taking it with at least some crackers or toast      9  Constipation: Patients often experienced constipation after surgery  You may take over-the-counter medication for this, such as Metamucil, Senokot, Colace, milk of magnesia, etc  If you experience significant nausea or vomiting after abdominal surgery, call the office before trying any of these medications  10  Call the office: If you are experiencing any of the following: fevers above 101 5°, significant nausea or vomiting, if the wound develops drainage and/or excessive redness around the wound, or if you have significant diarrhea or other worsening symptoms  11  Pain: You may be given a prescription for pain medication  This should be given to you upon discharge from the hospital       Oxycodone/Acetaminophen (By mouth)   Acetaminophen (d-hexd-e-MIN-oh-fen), Oxycodone Hydrochloride (me-f-LBV-done ana maría-droe-KLOR-aleks)  Treats moderate to moderately severe pain  This medicine is a narcotic pain reliever  Brand Name(s): Endocet, Nalocet, Percocet, Primlev, Prolate   There may be other brand names for this medicine  When This Medicine Should Not Be Used: This medicine is not right for everyone  Do not use it if you had an allergic reaction to acetaminophen or oxycodone, or if you have serious lung or breathing problems (including asthma, respiratory depression), or stomach or bowel blockage (including paralytic ileus)  How to Use This Medicine:   Capsule, Liquid, Tablet, Long Acting Tablet  · Your doctor will tell you how much medicine to use  Do not use more than directed  · An overdose can be dangerous  Follow directions carefully so you do not get too much medicine at one time  · Oral liquid: Measure the oral liquid medicine with a marked measuring spoon, oral syringe, or medicine cup  · Swallow the extended-release tablet whole  Do not crush, break, or chew it   Do not lick or wet the tablet before placing it in your mouth  Do not give this medicine through a feeding tube  · This medicine should come with a Medication Guide  Ask your pharmacist for a copy if you do not have one  · Missed dose: If you miss a dose of this medicine, skip the missed dose and go back to your regular dosing schedule  Do not double doses  · Store the medicine in a closed container at room temperature, away from heat, moisture, and direct light  Drop off any unused narcotic medicine at a drug take-back location right away  If you do not have a drug take-back location near you, flush any unused narcotic medicine down the toilet  Check your local drug store and clinics for take-back locations  You can also check the [employer] web site for locations  Here is the link to the FDA safe disposal of medicines website: www fda gov/drugs/resourcesforyou/consumers/buyingusingmedicinesafely/ensuringsafeuseofmedicine/safedisposalofmedicines/lij917718 htm  Drugs and Foods to Avoid:   Ask your doctor or pharmacist before using any other medicine, including over-the-counter medicines, vitamins, and herbal products  · Do not use Xartemis XR if you are using or have used an MAO inhibitor in the past 14 days  · Some medicines can affect how this medicine works  Tell your doctor if you are using any of the following:   ? Carbamazepine, erythromycin, ketoconazole, lamotrigine, mirtazapine, naltrexone, phenytoin, probenecid, propranolol, rifampin, ritonavir, tramadol, trazodone, or zidovudine  ? Birth control pills  ? Diuretic (water pill)  ? Medicine to treat depression  ? Phenothiazine medicine  ? Triptan medicine to treat migraine headaches  · Do not drink alcohol while you are using this medicine  Acetaminophen can damage your liver, and alcohol can increase this risk  Do not take acetaminophen without asking your doctor if you have 3 or more drinks of alcohol every day    · Tell your doctor if you use anything else that makes you sleepy  Some examples are allergy medicine, narcotic pain medicine, and alcohol  Tell your doctor if you are using buprenorphine, butorphanol, nalbuphine, pentazocine, a benzodiazepine, or a muscle relaxer (including cyclobenzaprine, metaxalone)  · Do not drink alcohol while you are using this medicine  Acetaminophen can damage your liver, and your risk is higher if you also drink alcohol  Warnings While Using This Medicine:   · Tell your doctor if you are pregnant or breastfeeding, or if you have kidney disease, liver disease, heart disease, low blood pressure, breathing problems or lung disease (including COPD), thyroid problems, Ashwin disease, pancreas or gallbladder problems, prostate problems, trouble urinating, or a stomach problems, or a history of head injury or brain damage, seizures, or alcohol or drug abuse  Tell your doctor if you are allergic to codeine  · This medicine may cause the following problems:  ? High risk of overdose, which can lead to death  ? Respiratory depression (serious breathing problem that can be life-threatening)  ? Sleep-related breathing problems (including sleep apnea, sleep-related hypoxemia)  ? Liver problems  ? Serious skin reactions  ? Serotonin syndrome (when used with certain medicines)  · This medicine may make you dizzy or drowsy  Do not drive or do anything that could be dangerous until you know how this medicine affects you  Sit or lie down if you feel dizzy  Stand up carefully  · This medicine contains acetaminophen  Read the labels of all other medicines you are using to see if they also contain acetaminophen, or ask your doctor or pharmacist  Sonny Haines not use more than 4 grams (4,000 milligrams) total of acetaminophen in one day  · This medicine can be habit-forming  Do not use more than your prescribed dose  Call your doctor if you think your medicine is not working  · Do not stop using this medicine suddenly   Your doctor will need to slowly decrease your dose before you stop it completely  · This medicine could cause infertility  Talk with your doctor before using this medicine if you plan to have children  · This medicine may cause constipation, especially with long-term use  Ask your doctor if you should use a laxative to prevent and treat constipation  · Keep all medicine out of the reach of children  Never share your medicine with anyone  Possible Side Effects While Using This Medicine:   Call your doctor right away if you notice any of these side effects:  · Allergic reaction: Itching or hives, swelling in your face or hands, swelling or tingling in your mouth or throat, chest tightness, trouble breathing  · Anxiety, restlessness, fast heartbeat, fever, muscle spasms, twitching, diarrhea, seeing or hearing things that are not there  · Blistering, peeling, red skin rash  · Blue lips, fingernails, or skin  · Dark urine or pale stools, loss of appetite, stomach pain, yellow skin or eyes  · Extreme weakness, shallow breathing, uneven heartbeat, seizures, sweating, or cold or clammy skin  · Severe confusion, lightheadedness, dizziness, or fainting  · Severe constipation, nausea, or vomiting  · Trouble breathing or slow breathing  If you notice these less serious side effects, talk with your doctor:   · Headache  · Mild constipation, nausea, or vomiting  · Mild sleepiness or drowsiness  If you notice other side effects that you think are caused by this medicine, tell your doctor  Call your doctor for medical advice about side effects  You may report side effects to FDA at 0-127-FDA-6416  © Copyright [address] Information is for End User's use only and may not be sold, redistributed or otherwise used for commercial purposes  The above information is an  only  It is not intended as medical advice for individual conditions or treatments   Talk to your doctor, nurse or pharmacist before following any medical regimen to see if it is safe and effective for you

## 2021-02-22 NOTE — PHYSICAL THERAPY NOTE
PT EVALUATION    Pt is independent with ADLs, gait and mobility  No further skilled PT/OT needs  Pt is safe for dc home  02/22/21 1000   PT Last Visit   PT Visit Date 02/22/21   Note Type   Note type Evaluation   Pain Assessment   Pain Assessment Tool 0-10   Pain Score 3   Pain Location/Orientation Location: Abdomen   Home Living   Type of [address] [place] [address] Two level; Able to live on main level with bedroom/bathroom  (2 [address] with rail)   Prior Function   Level of [place] Independent with ADLs and functional mobility   Lives With [facility] Help From Family   ADL Assistance Independent   IADLs Independent   Vocational Full time employment   Comments Pt amb w/out AD PTA   Restrictions/Precautions   Other Precautions Pain   General   Additional Pertinent History Pt adm with abd pain and is s/p exp lap 2/21/21  Cognition   Overall Cognitive Status WFL   Arousal/Participation Cooperative   Orientation Level Oriented X4   Following Commands Follows all commands and directions without difficulty   RLE Assessment   RLE Assessment WFL   LLE Assessment   LLE Assessment WFL   Bed Mobility   Supine to Sit 7  Independent   Sit to Supine 7  Independent   Additional Comments Pt instructed/demonstrated log rolling with bed mobility   Transfers   Sit to Stand 7  Independent   Stand to Sit 7  Independent   Ambulation/Elevation   Gait pattern WNL   Gait Assistance 7  Independent   Assistive Device None   Distance 200 feet with change in direction   Stair Management Assistance 7  Independent   Stair Management Technique Alternating pattern; One rail L   Number of Stairs 4   Balance   Static Sitting Good   Dynamic Sitting Good   Static Standing Good   Dynamic Standing Good   Ambulatory Good   Activity Tolerance   Activity Tolerance Patient limited by pain   Assessment   Problem List Decreased endurance;Decreased mobility;Pain   Assessment Patient seen for Physical Therapy evaluation   Patient admitted with Small bowel obstruction ([address] )  Comorbidities affecting patient's physical performance include: fatigue, RLS, arthralgia R AC joint  Personal factors affecting patient at time of initial evaluation include: lives in two story house and stairs to enter home  Prior to admission, patient was independent with functional mobility without assistive device, independent with ADLS, independent with IADLS, living with son in a two level home with 2 steps to enter, ambulating household distance, ambulating community distances and works full time  Please find objective findings from Physical Therapy assessment regarding body systems outlined above with impairments and limitations including decreased endurance, pain, decreased activity tolerance and decreased functional mobility tolerance  The Barthel Index was used as a functional outcome tool presenting with a score of 100 today indicating no limitations of functional mobility and ADLS  Patient's clinical presentation is currently evolving as seen in patient's presentation of vital sign response, changing level of pain, new onset of impairment of functional mobility and decreased endurance  As demonstrated by objective findings, the assigned level of complexity for this evaluation is moderate  The patient's AM-PAC Basic Mobility Inpatient Short Form Raw Score is 24, Standardized Score is 57 68  A standardized score greater than 42 9 suggests the patient may benefit from discharge to home  Please also refer to the recommendation of the Physical Therapist for safe discharge planning  Pt is independent with ADLs, gait and mobility  No further skilled PT/OT needs  Pt is safe for dc home  Plan   Treatment/Interventions ADL retraining;Functional transfer training;LE strengthening/ROM; Elevations; Therapeutic exercise; Endurance training;Patient/family training;Equipment eval/education; Bed mobility;Gait training; Compensatory technique education   PT Frequency One time visit Recommendation   PT Discharge Recommendation Return to previous environment with social support   Additional Comments Pt instructed how to get in/out of car   Marissa Pereira [address]   Turning in Bed Without Bedrails 4   Lying on Back to Sitting on Edge of Flat Bed 4   Moving Bed to Chair 4   Standing Up From Chair 4   Walk in Room 4   Climb 3-5 Stairs 4   Basic Mobility Inpatient Raw Score 24   Basic Mobility Standardized Score 57 68   Barthel Index   Feeding 10   Bathing 5   Grooming Score 5   Dressing Score 10   Bladder Score 10   Bowels Score 10   Toilet Use Score 10   Transfers (Bed/Chair) Score 15   Mobility (Level Surface) Score 15   Stairs Score 10   Barthel Index Score 631   Licensure   [place] License Number  [employer], [place] [id number]

## 2021-02-22 NOTE — CASE MANAGEMENT
LOS: 1 DAY  UNPLANNED RA RISK SCORE: 8  RA: NO  BUNDLE: NO    CM met with patient and discussed dc planning and the role of CM  Patient lives in a house with her 26 y/o son  There are 4 steps to enter and then one floor  She is independent of all ADL's and works FT  She srives herself to work and to all appointments  She declines the need for any services at this time  No DME  No POA/LW  Information provided  Denies and h/o MH issues or D&A problems  She uses the [address] at [facility]  Her PCP is Dr Fahad Randolph     Patient will have a ride home with her boyfriend Rex Son at [employer]  He will pick her up around [address]

## 2021-02-22 NOTE — UTILIZATION REVIEW
Notification of Inpatient Admission/Inpatient Authorization Request   This is a Notification of Inpatient Admission for [address]  Be advised that this patient was admitted to our facility under Inpatient Status  Contact Marcie Jaime at [phone] for additional admission information  [address]  DEPT  DEDICATED [phone]  Patient Name:   Jitendra Monae   YOB: 1965       [address]   [address]:   [address] [facility]  Tax ID: [id number]  NPI: [id number] Attending Provider/NPI:  Phone:  Address: Shaun Jewell [[id number]]  [phone]  Same as VENKAT/Maurice Coles 1106 of Service Code: 24 Place of Service Name:  [address] [place] [address]   Start Date: 2/21/21 0254 Discharge Date & Time: No discharge date for patient encounter  Type of Admission: Inpatient Status Discharge Disposition (if discharged): Home/Self Care   Patient Diagnoses: Bowel obstruction ([facility] [address] ) [K56 609]  Abdominal pain [R10 9]     Orders: Admission Orders (From admission, onward)     Ordered        02/21/21 0446  Inpatient Admission  Once                    Assigned Utilization Review Contact: Kimberly Jaime  Utilization   Network Utilization Review Department  Phone: [phone]; Fax [phone]  Email: [email or web address]   ATTENTION PAYERS: Please call the assigned Utilization  directly with any questions or concerns ALL voicemails in the department are confidential  Send all requests for admission clinical reviews, approved or denied determinations and any other requests to dedicated fax number belonging to the campus where the patient is receiving treatment

## 2021-02-22 NOTE — OCCUPATIONAL THERAPY NOTE
OT EVALUATION       02/22/21 1012   Note Type   Note type Evaluation   Cancel Reasons Other   Assessment   Assessment Pt is independent with ADLS  No skilled OT needs at this time  Licensure   [place] License Number  [address] [place] [address]  Tawanna Mendez [address], OTR/L, [id number]

## 2021-02-23 ENCOUNTER — TRANSITIONAL CARE MANAGEMENT (OUTPATIENT)
Dept: FAMILY MEDICINE CLINIC | Facility: CLINIC | Age: 56
End: 2021-02-23

## 2021-02-23 NOTE — PLAN OF CARE
Problem: PAIN - ADULT  Goal: Verbalizes/displays adequate comfort level or baseline comfort level  Description: Interventions:  - Encourage patient to monitor pain and request assistance  - Assess pain using appropriate pain scale  - Administer analgesics based on type and severity of pain and evaluate response  - Implement non-pharmacological measures as appropriate and evaluate response  - Consider cultural and social influences on pain and pain management  - Notify physician/advanced practitioner if interventions unsuccessful or patient reports new pain  Outcome: Completed     Problem: INFECTION - ADULT  Goal: Absence or prevention of progression during hospitalization  Description: INTERVENTIONS:  - Assess and monitor for signs and symptoms of infection  - Monitor lab/diagnostic results  - Monitor all insertion sites, i e  IV site, surgical site   - [place] appropriate cooling/warming therapies per order  - Administer medications as ordered  - Instruct and encourage patient and family to use good hand hygiene technique  - Identify and instruct in appropriate isolation precautions for identified infection/condition  Outcome: Completed     Problem: SKIN/TISSUE INTEGRITY - ADULT  Goal: Incision(s), wounds(s) or drain site(s) healing without S/S of infection  Description: INTERVENTIONS  - Assess and document risk factors for skin impairment   - Assess and document dressing, incision, wound bed, drain sites and surrounding tissue  - Consider nutrition services referral as needed  - Oral mucous membranes remain intact  - Provide patient/ family education  Outcome: Completed

## 2021-02-24 NOTE — UTILIZATION REVIEW
Notification of Discharge  This is a Notification of Discharge from our facility [address]  Please be advised that this patient has been discharge from our facility  Below you will find the admission and discharge date and time including the patients disposition  PRESENTATION DATE: 2/20/2021 11:16 PM  OBS ADMISSION DATE:   IP ADMISSION DATE: 2/21/21 0254   DISCHARGE DATE: 2/22/2021  7:55 PM  DISPOSITION: Home/Self Care Home/Self Care   Admission Orders listed below:  Admission Orders (From admission, onward)     Ordered        02/21/21 0446  Inpatient Admission  Once                   Please contact the UR Department if additional information is required to close this patient's authorization/case  [facility] Utilization Review Department  Main: [phone] x carefully listen to the prompts  All voicemails are confidential   [email or web address]  Send all requests for admission clinical reviews, approved or denied determinations and any other requests to dedicated fax number below belonging to the campus where the patient is receiving treatment   List of dedicated fax numbers:  [address] DENIALS (Administrative/Medical Necessity) [phone]   [address] (Maternity/NICU/Pediatrics) [phone]   [facility] [phone]   [facility] [phone]   Gian Yee [phone]   Damaris Mack [facility] [address] [place] [address] [phone]   [facility]  [phone]   [address] [facility]  [address] [facility] [address] [facility] [phone]

## 2021-02-25 ENCOUNTER — OFFICE VISIT (OUTPATIENT)
Dept: PODIATRY | Facility: CLINIC | Age: 56
End: 2021-02-25
Payer: COMMERCIAL

## 2021-02-25 VITALS
SYSTOLIC BLOOD PRESSURE: 125 MMHG | DIASTOLIC BLOOD PRESSURE: 80 MMHG | BODY MASS INDEX: 28.09 KG/M2 | WEIGHT: 174.8 LBS | HEIGHT: 66 IN

## 2021-02-25 DIAGNOSIS — L60.3 NAIL DYSTROPHY: Primary | ICD-10-CM

## 2021-02-25 DIAGNOSIS — L60.1 ONYCHOLYSIS: ICD-10-CM

## 2021-02-25 DIAGNOSIS — M79.674 PAIN IN TOE OF RIGHT FOOT: ICD-10-CM

## 2021-02-25 LAB
ATRIAL RATE: 50 BPM
BACTERIA SPEC BFLD CULT: NO GROWTH
P AXIS: 40 DEGREES
PR INTERVAL: 148 MS
QRS AXIS: 52 DEGREES
QRSD INTERVAL: 80 MS
QT INTERVAL: 446 MS
QTC INTERVAL: 406 MS
T WAVE AXIS: 52 DEGREES
VENTRICULAR RATE: 50 BPM

## 2021-02-25 PROCEDURE — 99202 OFFICE O/P NEW SF 15 MIN: CPT | Performed by: PODIATRIST

## 2021-02-25 PROCEDURE — 93010 ELECTROCARDIOGRAM REPORT: CPT | Performed by: INTERNAL MEDICINE

## 2021-02-25 PROCEDURE — 11730 AVULSION NAIL PLATE SIMPLE 1: CPT | Performed by: PODIATRIST

## 2021-02-25 RX ORDER — LIDOCAINE HYDROCHLORIDE 10 MG/ML
1 INJECTION, SOLUTION EPIDURAL; INFILTRATION; INTRACAUDAL; PERINEURAL ONCE
Status: COMPLETED | OUTPATIENT
Start: 2021-02-25 | End: 2021-02-25

## 2021-02-25 RX ORDER — LIDOCAINE HYDROCHLORIDE AND EPINEPHRINE 10; 10 MG/ML; UG/ML
1 INJECTION, SOLUTION INFILTRATION; PERINEURAL ONCE
Status: COMPLETED | OUTPATIENT
Start: 2021-02-25 | End: 2021-02-25

## 2021-02-25 RX ADMIN — LIDOCAINE HYDROCHLORIDE 1 ML: 10 INJECTION, SOLUTION EPIDURAL; INFILTRATION; INTRACAUDAL; PERINEURAL at 14:43

## 2021-02-25 RX ADMIN — LIDOCAINE HYDROCHLORIDE AND EPINEPHRINE 1 ML: 10; 10 INJECTION, SOLUTION INFILTRATION; PERINEURAL at 14:43

## 2021-02-25 NOTE — PROGRESS NOTES
Assessment/Plan:      Explained the patient that she has a dystrophic right great toenail due to prior trauma  The toenail can be removed but it is likely that it will regrow in a dystrophic manner  Should this occur, total matrixectomy would be recommended as well  Patient desires to try understanding that nail will not regrow for approximately 4-6 months  Discussed treatment options recommending total avulsion  Verbal consent was given  Procedure performed as follows: Anesthesia via 3 cc of a 1:1 mixture of 1 percent xylocaine with epinephrine and 1 percent xylocaine plain  A Betadine prep was performed  The right great toenail was removed avulsed to the eponychium  A bacitracin dressing is applied  The patient is to soak in warm water twice a day followed by a Neosporin dressing  No problem-specific Assessment & Plan notes found for this encounter  Diagnoses and all orders for this visit:    Nail dystrophy  -     lidocaine (PF) (XYLOCAINE-MPF) 1 % injection 1 mL  -     lidocaine-epinephrine (XYLOCAINE/EPINEPHRINE) 1 %-1:100,000 injection 1 mL    Onycholysis    Pain in toe of right foot          Subjective:      Patient ID: Jostin Villarreal is a 63 y o  female  HPI      Patient presents with concern regarding her right great toenail  This toenail is loose and discolored but not very painful  Patient notes that the nail has been injured on multiple occasions  No other toenail is so affected  Patient desires toenail removed with the hope being that a better 1 would grow back  The following portions of the patient's history were reviewed and updated as appropriate: allergies, current medications, past family history, past medical history, past social history, past surgical history and problem list     Review of Systems   Respiratory: Negative  Cardiovascular: Negative  Gastrointestinal: Negative  Musculoskeletal: Negative  Skin: Negative                Objective:      BP 125/80 (BP Location: Left arm)   Ht 5' 6" (1 676 m)   Wt 79 3 kg (174 lb 12 8 oz)   LMP 06/22/2016   BMI 28 21 kg/m²          Physical Exam  Constitutional:       Appearance: Normal appearance  Cardiovascular:      Pulses: Normal pulses  Musculoskeletal: Normal range of motion  Skin:     Comments: Right hallux nail plate exhibits onycholysis  Toenail is gray in color       Nail removal    Date/Time: 2/25/2021 2:57 PM  Performed by: Marquez Vera DPM  Authorized by: Marquez Vera DPM     Patient location:  ClinicUniversal Protocol:  Consent: Verbal consent obtained  Risks and benefits: risks, benefits and alternatives were discussed  Consent given by: patient  Patient understanding: patient states understanding of the procedure being performed  Patient identity confirmed: verbally with patient      Location:     Foot:  R big toe  Pre-procedure details:     Skin preparation:  Betadine  Anesthesia (see MAR for exact dosages):      Anesthesia method:  Nerve block    Block anesthetic:  Lidocaine 1% WITH epi and lidocaine 1% w/o epi    Block injection procedure:  Anatomic landmarks identified  Nail Removal:     Nail removed:  Complete    Nail bed sutured: no    Ingrown nail:     Wedge excision of skin: no      Nail matrix removed or ablated:  None  Post-procedure details:     Dressing:  4x4 sterile gauze and antibiotic ointment N/A

## 2021-02-26 LAB — BACTERIA SPEC ANAEROBE CULT: NO GROWTH

## 2021-03-04 ENCOUNTER — OFFICE VISIT (OUTPATIENT)
Dept: PODIATRY | Facility: CLINIC | Age: 56
End: 2021-03-04
Payer: COMMERCIAL

## 2021-03-04 VITALS
DIASTOLIC BLOOD PRESSURE: 85 MMHG | WEIGHT: 174 LBS | BODY MASS INDEX: 27.97 KG/M2 | HEIGHT: 66 IN | SYSTOLIC BLOOD PRESSURE: 134 MMHG

## 2021-03-04 DIAGNOSIS — L60.1 ONYCHOLYSIS: Primary | ICD-10-CM

## 2021-03-04 PROCEDURE — 99212 OFFICE O/P EST SF 10 MIN: CPT | Performed by: PODIATRIST

## 2021-03-04 NOTE — PROGRESS NOTES
Patient presents 1 week post total nail avulsion right hallux  Patient has mild pain secondary to the sit procedure but also erythema and drainage from the medial   Eponychium  This is atypical for total nail avulsion without phenol  Initially recommended a Z-Jomar but patient has tendency towards yeast infections  Instead Bactroban ointment recommended b i d  She will be reassessed in 2 weeks

## 2021-03-09 ENCOUNTER — HOSPITAL ENCOUNTER (OUTPATIENT)
Dept: RADIOLOGY | Facility: HOSPITAL | Age: 56
Discharge: HOME | End: 2021-03-09
Payer: COMMERCIAL

## 2021-03-09 VITALS — HEIGHT: 66 IN | WEIGHT: 174 LBS | BODY MASS INDEX: 27.97 KG/M2

## 2021-03-09 DIAGNOSIS — Z12.31 SCREENING MAMMOGRAM FOR HIGH-RISK PATIENT: ICD-10-CM

## 2021-03-09 PROCEDURE — 77067 SCR MAMMO BI INCL CAD: CPT

## 2021-03-09 PROCEDURE — 77063 BREAST TOMOSYNTHESIS BI: CPT

## 2021-03-11 ENCOUNTER — TELEPHONE (OUTPATIENT)
Dept: FAMILY MEDICINE CLINIC | Facility: CLINIC | Age: 56
End: 2021-03-11

## 2021-03-11 ENCOUNTER — TELEPHONE (OUTPATIENT)
Dept: OBGYN CLINIC | Facility: CLINIC | Age: 56
End: 2021-03-11

## 2021-03-11 NOTE — TELEPHONE ENCOUNTER
Patient called in, she's been playing phone tag with our nurses   She has an appointment scheduled for PMB on 3/26 but she recently had an emergency abdominal surgery and she has a lot of questions:    -She recently had an EMB does she need another?  -She isn't sure if she will be up for an appointment  -She was also prescribed a cream that she isn't sure if she should still be using at this time    Please advise    Best number ending in: [phone]

## 2021-03-12 NOTE — TELEPHONE ENCOUNTER
Pt contacted and informed as directed  Pt agreed with plan of action and requested to change appt from 03/26 with provider tami and instead she requested to see provider Leslie sooner  Per patients request, pt scheduled for 03/16/21 2:05 arrival in [place] with Leslie, appointment to begin 2:20

## 2021-03-12 NOTE — TELEPHONE ENCOUNTER
I would advise that she comes when she feels up to it but I wouldn't delay follow up longer than 4 weeks

## 2021-03-12 NOTE — TELEPHONE ENCOUNTER
Spoke with pt  States this is her 3rd episode of pmb in several years  emb with rs 1/8/18, began with red spotting 2/15 times 5 days  Had been rx's estradiol cream 01% by dr Holli Palmer at [facility]  Also had an eb prior to 1/8/18 with gyn [place]  She was unable to continue with rs as he is unavailable evenings and she  is very happy with kk who she recently saw  req further instruction  Will come back if she feels absolutely necesary

## 2021-03-12 NOTE — TELEPHONE ENCOUNTER
Per notes in chart we have no calls from the pt since 02/17/2021 encounter  Sending to provider to address concerns

## 2021-03-14 NOTE — PROGRESS NOTES
Subjective:       Jeffery Flanagan presents to the clinic following a hospitalization from 2/20/21-2/22/21  Patient underwent diagnostic laparoscopy, lysis of adhesions and reduction of internal hernia on 2/21/21  Cultures from patients abdomen no growth: aerobic or anaerobic     Patient reports doing well  No nasuea/vomiting  Tolerating regular diet with regular bowel moevments  No further vaginal bleeding, scheduled to see gynecologist tomorrow  Patient does report having prior to surgery and since srugery LLQ pain  She relates this so worse with pushing or heavy lifting  Not pressent on todays exam  Not worse with flexation or extension of hip  No appreciaated hernia     PICTURES IN MEDIA CHART FROM SURGERY    Per DC summary: " Jeffery Flanagan is a 63 y o  female admitted for small bowel obstruction  Patient presented with abdominal pain after eating dinner  Ct scan showed:Dilated bowel loops and mesenteric edema with mesentery emanating from a focal point indicating  a small bowel closed loop obstruction secondary to an internal hernia  Patient was then take to the OR for exploratory laparotomy, lysis of adhesions and reduction of internal hernia  Intraoperative findings: adhesions from left ovary to sigmoid colon causing internal hernia and closed loop obstruction  POD# 1 patient is tolerating regular diet, passing flatus and abdominal pain well controlled  Dicussed with patient discharge instructions, wound care, follow up, and when to follow up sooner at length  All questions answered to satisfactory  Patient verbalizes understanding and agrees with treatment plan  "   Objective:      LMP 06/22/2016     General:  alert and oriented, in no acute distress   Abdomen: soft, bowel sounds active, non-tender   Incision:   healing well, no drainage, no erythema, no hernia, no seroma, no swelling, no dehiscence, incision well approximated       Assessment:      Doing well postoperatively  Plan:      1   Continue any current medications  2  Wound care discussed    3  Follow up with gynecologist tomorrow, consider ultrasound to evaluate abdominal wall and left groin though no hernia appreciated on physical exam

## 2021-03-15 ENCOUNTER — OFFICE VISIT (OUTPATIENT)
Dept: SURGERY | Facility: CLINIC | Age: 56
End: 2021-03-15

## 2021-03-15 VITALS
HEIGHT: 66 IN | BODY MASS INDEX: 27.64 KG/M2 | DIASTOLIC BLOOD PRESSURE: 72 MMHG | SYSTOLIC BLOOD PRESSURE: 122 MMHG | HEART RATE: 78 BPM | WEIGHT: 172 LBS | TEMPERATURE: 96.2 F

## 2021-03-15 DIAGNOSIS — K56.609 SMALL BOWEL OBSTRUCTION (HCC): Primary | ICD-10-CM

## 2021-03-15 PROCEDURE — 99024 POSTOP FOLLOW-UP VISIT: CPT | Performed by: SURGERY

## 2021-03-15 NOTE — TELEPHONE ENCOUNTER
Pt called back today 03/15 stating she wanted to give addition information  Pt used 3 day monistat Friday 02/11, 02/12 and 02/13 and it caused some burning and she felt better and then she had PMB but she read that is can cause bleeding  Pt saw Abrahan López general surgeon and she advised pt to call ob provider and let her know to take a look at the picture that are in the media file from the surgery on 02/21/21  Pt had milky fluid outside outside of her intestine by her left ovary that was tested  Its not cancer, but ist next to the left ovary and the ovary was turned and still causing pt pain on the left side  There is still blood flow to the ovary  Surgeon does not believe its a hernia and would like provider to rule out if the turned over may be the cause,  as general surgeon believes it may be the ovary or ruptured cyts  Pt will see provider on 03/16  as scheduled but wanted to give KK the  Update

## 2021-03-15 NOTE — TELEPHONE ENCOUNTER
Juanita De Leon, she will need to see a physician for the general surgeon's concern   I will still see her for the 3/16/21 visit for  bleeding but please schedule her with a doc to review the other issue

## 2021-03-15 NOTE — LETTER
March 15, 2021     Patient: Berenice Honeycutt   YOB: 1965   Date of Visit: 3/15/2021       To Whom it May Concern:    Tenzin Kevin is under my professional care  She was seen in my office on 3/15/2021  She may return to work on 3/17/2021  With no heavy lifting greater than 20 lbs until 4/5/2021       If you have any questions or concerns, please don't hesitate to call           Sincerely,          Ladan Bejarano MD        CC: No Recipients

## 2021-03-16 ENCOUNTER — OFFICE VISIT (OUTPATIENT)
Dept: OBGYN CLINIC | Facility: CLINIC | Age: 56
End: 2021-03-16
Payer: COMMERCIAL

## 2021-03-16 VITALS — DIASTOLIC BLOOD PRESSURE: 88 MMHG | WEIGHT: 171.4 LBS | BODY MASS INDEX: 27.66 KG/M2 | SYSTOLIC BLOOD PRESSURE: 122 MMHG

## 2021-03-16 DIAGNOSIS — N95.0 POSTMENOPAUSAL BLEEDING: Primary | ICD-10-CM

## 2021-03-16 DIAGNOSIS — R35.0 URINARY FREQUENCY: ICD-10-CM

## 2021-03-16 DIAGNOSIS — K56.609 SMALL BOWEL OBSTRUCTION (HCC): ICD-10-CM

## 2021-03-16 LAB
BACTERIA UR QL AUTO: ABNORMAL /HPF
BILIRUB UR QL STRIP: NEGATIVE
CLARITY UR: CLEAR
COLOR UR: YELLOW
GLUCOSE UR STRIP-MCNC: NEGATIVE MG/DL
HGB UR QL STRIP.AUTO: ABNORMAL
HYALINE CASTS #/AREA URNS LPF: ABNORMAL /LPF
KETONES UR STRIP-MCNC: NEGATIVE MG/DL
LEUKOCYTE ESTERASE UR QL STRIP: NEGATIVE
NITRITE UR QL STRIP: NEGATIVE
NON-SQ EPI CELLS URNS QL MICRO: ABNORMAL /HPF
PH UR STRIP.AUTO: 6.5 [PH]
PROT UR STRIP-MCNC: NEGATIVE MG/DL
RBC #/AREA URNS AUTO: ABNORMAL /HPF
SP GR UR STRIP.AUTO: 1.01 (ref 1–1.03)
UROBILINOGEN UR QL STRIP.AUTO: 0.2 E.U./DL
WBC #/AREA URNS AUTO: ABNORMAL /HPF

## 2021-03-16 PROCEDURE — 81001 URINALYSIS AUTO W/SCOPE: CPT | Performed by: NURSE PRACTITIONER

## 2021-03-16 PROCEDURE — 99214 OFFICE O/P EST MOD 30 MIN: CPT | Performed by: NURSE PRACTITIONER

## 2021-03-16 PROCEDURE — 87086 URINE CULTURE/COLONY COUNT: CPT | Performed by: NURSE PRACTITIONER

## 2021-03-18 LAB — BACTERIA UR CULT: NORMAL

## 2021-03-22 ENCOUNTER — HOSPITAL ENCOUNTER (OUTPATIENT)
Dept: RADIOLOGY | Facility: HOSPITAL | Age: 56
Discharge: HOME | End: 2021-03-22
Payer: COMMERCIAL

## 2021-03-22 ENCOUNTER — TELEMEDICINE (OUTPATIENT)
Dept: OBGYN CLINIC | Facility: CLINIC | Age: 56
End: 2021-03-22
Payer: COMMERCIAL

## 2021-03-22 ENCOUNTER — TRANSCRIBE ORDERS (OUTPATIENT)
Dept: RADIOLOGY | Facility: HOSPITAL | Age: 56
End: 2021-03-22

## 2021-03-22 DIAGNOSIS — N95.0 POSTMENOPAUSAL BLEEDING: ICD-10-CM

## 2021-03-22 DIAGNOSIS — R10.32 LLQ PAIN: Primary | ICD-10-CM

## 2021-03-22 PROBLEM — R35.0 URINARY FREQUENCY: Status: ACTIVE | Noted: 2021-03-22

## 2021-03-22 PROCEDURE — 76856 US EXAM PELVIC COMPLETE: CPT

## 2021-03-22 PROCEDURE — 76830 TRANSVAGINAL US NON-OB: CPT

## 2021-03-22 PROCEDURE — 99212 OFFICE O/P EST SF 10 MIN: CPT | Performed by: OBSTETRICS & GYNECOLOGY

## 2021-03-22 NOTE — ASSESSMENT & PLAN NOTE
The patient will schedule VV with a physician colleague to review images of adnexa from recent surgery

## 2021-03-22 NOTE — PROGRESS NOTES
Virtual Regular Visit      Assessment/Plan:    Reviewed pictures with patient  What is visualized of tube and ovary appear normal   Will await pelvic US results as this will better evaluate her ovary  She is in agreement with plan and had no further questions or concerns  Reason for visit is   Chief Complaint   Patient presents with    Virtual Regular Visit        Encounter provider Diogo Guerrero MD    Provider located at [address] [address]      Recent Visits  Date Type Provider Dept   03/16/21 Office Visit [place]   Showing recent visits within past 7 days and meeting all other requirements     Future Appointments  No visits were found meeting these conditions  Showing future appointments within next 150 days and meeting all other requirements        The patient was identified by name and date of birth  Bri Fletcher was informed that this is a telemedicine visit and that the visit is being conducted through [employer] and patient was informed that this is a secure, HIPAA-compliant platform  She agrees to proceed     My office door was closed  No one else was in the room  She acknowledged consent and understanding of privacy and security of the video platform  The patient has agreed to participate and understands they can discontinue the visit at any time  It was my intent to perform this visit via video technology but the patient was not able to do a video connection so the visit was completed via audio telephone only  Patient is aware this is a billable service  Subjective  Bri Fletcher is a 63 y o  female for virtual visit   Ricco Blakely presents today for a VV to discuss her intra-op pictures to review her ovary/tube  She is recovering well from surgery  Just had pelvic US for indication of postmenopausal bleeding - results pending       She has no specific concerns today  She has some chronic LLQ pain - previously attributed to hip pain  Surgeon was concerned that her pain could be associated with her ovary          Past Medical History:   Diagnosis Date    Lyme disease     Small bowel obstruction ([address] )        Past Surgical History:   Procedure Laterality Date    [place] LAP,DIAGNOSTIC ABDOMEN N/A 2/21/2021    Procedure: LAPAROSCOPY DIAGNOSTIC, exploratory laparotomy, lysis of adhesions, REDUCTION OF INTERNAL HERNIA;  Surgeon: Es Lucia MD;  Location: [address] [place] [address];  Service: General       Current Outpatient Medications   Medication Sig Dispense Refill    Calcium Carb-Cholecalciferol (CALCIUM 1000 + D) 1000-800 MG-UNIT TABS Take by mouth daily        guaifenesin-codeine (GUAIFENESIN AC) 100-10 MG/5ML liquid Take 5 mL by mouth 3 (three) times a day as needed for cough (Patient not taking: Reported on 1/16/2020) 120 mL 0    Iron-Vitamin C (IRON 100/C) 100-250 MG TABS Take by mouth once a week        LORazepam (ATIVAN) 0 5 mg tablet Take 1 tablet (0 5 mg total) by mouth as needed for anxiety (Launch) Take as need for dental work 30 tablet 0    Multiple Vitamin (MULTI-VITAMIN DAILY) TABS Take 1 tablet by mouth daily      mupirocin (BACTROBAN) 2 % ointment Apply topically 2 (two) times a day (Patient not taking: Reported on 3/15/2021) 22 g 0    Omega-3 Fatty Acids (FISH OIL) 645 MG CAPS Take by mouth once a week        oxyCODONE-acetaminophen (PERCOCET) 5-325 mg per tablet Take 1 tablet by mouth every 6 (six) hours as needed for severe pain for up to 10 dosesMax Daily Amount: 4 tablets (Patient not taking: Reported on 3/15/2021) 10 tablet 0    pramipexole (MIRAPEX) 0 25 mg tablet Take 1 tablet (0 25 mg total) by mouth daily at bedtime as needed (restless leg) 90 tablet 3    tobramycin-dexamethasone (TOBRADEX) ophthalmic suspension Administer 1 drop to the right eye every 4 (four) hours while awake (Patient not taking: Reported on 1/16/2020) 5 mL 1  tretinoin (RETIN-A) 0 025 % cream Apply thin film topically once daily 45 g 3     No current facility-administered medications for this visit  No Known Allergies    Review of Systems    Video Exam    There were no vitals filed for this visit  Physical Exam     I spent 8 minutes directly with the patient during this visit      VIRTUAL VISIT DISCLAIMER    Shanice Alma Rosa acknowledges that she has consented to an online visit or consultation  She understands that the online visit is based solely on information provided by her, and that, in the absence of a face-to-face physical evaluation by the physician, the diagnosis she receives is both limited and provisional in terms of accuracy and completeness  This is not intended to replace a full medical face-to-face evaluation by the physician  Shanice St understands and accepts these terms

## 2021-03-22 NOTE — ASSESSMENT & PLAN NOTE
Vaginal bleeding occurred while self treating for candidal infection  This does not sound strongly suggestive of being uterine in origin  Recommended thyroid studies and ultrasound   Will advise with results and recommend RTO for EMB if indicated

## 2021-03-22 NOTE — ASSESSMENT & PLAN NOTE
C/o urinary freq without sx of pyelo or other sx of UTI  Urine studies were sent  Will advise as indicated   Continue to push fluids

## 2021-03-22 NOTE — PROGRESS NOTES
Assessment/Plan:    Urinary frequency  C/o urinary freq without sx of pyelo or other sx of UTI  Urine studies were sent  Will advise as indicated  Continue to push fluids     Postmenopausal bleeding  Vaginal bleeding occurred while self treating for candidal infection  This does not sound strongly suggestive of being uterine in origin  Recommended thyroid studies and ultrasound  Will advise with results and recommend RTO for EMB if indicated     Small bowel obstruction [facility])  The patient will schedule VV with a physician colleague to review images of adnexa from recent surgery  Diagnoses and all orders for this visit:    Postmenopausal bleeding  -     Cancel: Tissue Exam  -     US pelvis complete w transvaginal; Future    Urinary frequency  -     Urinalysis with microscopic  -     Urine culture    Small bowel obstruction (HCC)          Subjective:      Patient ID: Reggie Harding is a 63 y o  female  This patient presents with  bleeding   LMP was in 2016ish  She called 2/17/21 with scant bright red bleeding which lasted for several days, no further bleeding since   Onset occurred after using OTC yeast infection med  She denies having pain with this   She had vaginal bleeding in 2018 and  bleeding work up was normal - ES 2mm and EMB neg     She reports some urinary freq today but denies dysuria, hematuria, fever or flank pain    Also with recent h/o emergent lap for bowel obstruction  Her surgeon requests that we review the images collected of her adnexa during this procedure   She denies postop complaints          The following portions of the patient's history were reviewed and updated as appropriate: allergies, current medications, past family history, past medical history, past social history, past surgical history and problem list     Review of Systems   Constitutional: Negative  Respiratory: Negative  Cardiovascular: Negative  Gastrointestinal: Negative      Genitourinary: Positive for frequency  Negative for dysuria, flank pain, hematuria, pelvic pain, urgency, vaginal bleeding and vaginal discharge  Musculoskeletal: Negative  Skin: Negative  Neurological: Negative  Psychiatric/Behavioral: Negative  Objective:      /88 (BP Location: Right arm, Patient Position: Sitting, Cuff Size: Standard)   Wt 77 7 kg (171 lb 6 4 oz)   LMP 06/22/2016   BMI 27 66 kg/m²          Physical Exam  Constitutional:       Appearance: She is well-developed  HENT:      Head: Normocephalic and atraumatic  Eyes:      Pupils: Pupils are equal, round, and reactive to light  Neck:      Musculoskeletal: Normal range of motion  Pulmonary:      Effort: Pulmonary effort is normal    Abdominal:      Hernia: There is no hernia in the left inguinal area or right inguinal area  Genitourinary:     Labia:         Right: No rash, tenderness, lesion or injury  Left: No rash, tenderness, lesion or injury  Vagina: No vaginal discharge, erythema, tenderness or bleeding  Cervix: No cervical motion tenderness, discharge or friability  Uterus: Normal        Adnexa:         Right: No mass, tenderness or fullness  Left: No mass, tenderness or fullness  Rectum: Normal    Musculoskeletal: Normal range of motion  Skin:     General: Skin is warm and dry  Neurological:      Mental Status: She is alert and oriented to person, place, and time  Psychiatric:         Behavior: Behavior normal          Thought Content:  Thought content normal          Judgment: Judgment normal

## 2021-03-24 ENCOUNTER — TELEPHONE (OUTPATIENT)
Dept: OBGYN CLINIC | Facility: CLINIC | Age: 56
End: 2021-03-24

## 2021-03-24 NOTE — TELEPHONE ENCOUNTER
----- Message from Barbara Rodriguez, [address] sent at 3/23/2021  5:33 PM EDT -----  Urine studies are normal   Please notify patient   Please advise her to continue pushing fluids and f/u PRN

## 2021-03-29 ENCOUNTER — TELEPHONE (OUTPATIENT)
Dept: OBGYN CLINIC | Facility: CLINIC | Age: 56
End: 2021-03-29

## 2021-03-29 NOTE — TELEPHONE ENCOUNTER
Per comm consent lm & reviewed note from Leslie in entirety    ----- Message from Abhijit Lebron, [address]  sent at 3/28/2021  5:21 PM EDT -----  Normal endo stripe for  status and no free fluid  Please notify patient and encourage her to continue to observe and calls with further episodes of  bleeding

## 2021-04-09 ENCOUNTER — TELEPHONE (OUTPATIENT)
Dept: SURGERY | Facility: CLINIC | Age: 56
End: 2021-04-09

## 2021-04-09 NOTE — TELEPHONE ENCOUNTER
GOOD AFTERNOON Kasie SANDERS [birth year]  [phone]  WOULD LIKE TO DISCUSS HER CT RESULTS WITH YOU  HAVE A GOOD WEEKEND

## 2021-04-20 ENCOUNTER — TELEPHONE (OUTPATIENT)
Dept: OTHER | Facility: OTHER | Age: 56
End: 2021-04-20

## 2021-04-20 NOTE — TELEPHONE ENCOUNTER
Pt called me on our health call line, I tried to get a back line of someone to call her back in regards to imaging she needs

## 2021-04-21 ENCOUNTER — TELEPHONE (OUTPATIENT)
Dept: FAMILY MEDICINE CLINIC | Facility: CLINIC | Age: 56
End: 2021-04-21

## 2021-04-21 DIAGNOSIS — N28.1 RENAL CYST, LEFT: Primary | ICD-10-CM

## 2021-04-21 DIAGNOSIS — G89.29 CHRONIC PAIN OF RIGHT KNEE: Primary | ICD-10-CM

## 2021-04-21 DIAGNOSIS — M25.561 CHRONIC PAIN OF RIGHT KNEE: Primary | ICD-10-CM

## 2021-04-21 NOTE — TELEPHONE ENCOUNTER
Patient called about recent imaging studies  She is asking for follow up imaging on the CT  which showed  1 6 cm left renal cystic lesion which does not meet CT criteria for simple cyst on this examination; recommend elective ultrasound or MRI abdomen with contrast, renal protocol, for further evaluation  She would also like you to include focus on her left ovary in the imaging ordered  Due to the US report - 2  Left ovary is not well visualized  No adnexal mass  She said she had a twisted ovary in past on that side  She is also c/o severe right knee pain that started today and is asking for imaging of that - she had an MRI in the past and did not f/u with any treatment  She would like a call back if you have any questions  She declined scheduling an appt

## 2021-04-21 NOTE — TELEPHONE ENCOUNTER
Renal ultrasound ordered  GYN follow up relative to ovarian concerns   refer to ortho for knee   printed     OV for any other concerns   Thanks

## 2021-05-20 ENCOUNTER — TELEMEDICINE (OUTPATIENT)
Dept: FAMILY MEDICINE CLINIC | Facility: CLINIC | Age: 56
End: 2021-05-20
Payer: COMMERCIAL

## 2021-05-20 DIAGNOSIS — H92.02 OTALGIA OF LEFT EAR: Primary | ICD-10-CM

## 2021-05-20 RX ORDER — NEOMYCIN SULFATE, POLYMYXIN B SULFATE AND HYDROCORTISONE 10; 3.5; 1 MG/ML; MG/ML; [USP'U]/ML
4 SUSPENSION/ DROPS AURICULAR (OTIC) 4 TIMES DAILY
Qty: 10 ML | Refills: 0 | Status: SHIPPED | OUTPATIENT
Start: 2021-05-20 | End: 2022-02-03

## 2021-05-20 NOTE — PROGRESS NOTES
Virtual Brief Visit    Assessment/Plan:    Problem List Items Addressed This Visit     None      Visit Diagnoses     Otalgia of left ear    -  Primary        neopsporn/juliano HC drops f/u as instructed        Reason for visit is   Chief Complaint   Patient presents with    Virtual Brief Visit        Encounter provider Gemma Riggs MD    Provider located at [address] [place] [id number]    Recent Visits  No visits were found meeting these conditions  Showing recent visits within past 7 days and meeting all other requirements     Today's Visits  Date Type Provider Dept   05/20/21 Telemedicine Gemma Riggs MD [facility]   Showing today's visits and meeting all other requirements     Future Appointments  No visits were found meeting these conditions  Showing future appointments within next 150 days and meeting all other requirements        After connecting through telephone, the patient was identified by name and date of birth  Shabnam Prince was informed that this is a telemedicine visit and that the visit is being conducted through telephone  My office door was closed  No one else was in the room  She acknowledged consent and understanding of privacy and security of the platform  The patient has agreed to participate and understands she can discontinue the visit at any time  Patient is aware this is a billable service  Subjective    Shabnam Prince is a 63 y o  female  L ear pain called left message returned call L ear pain no trauma    HPI     Past Medical History:   Diagnosis Date    Lyme disease     Small bowel obstruction ([address] )        Past Surgical History:   Procedure Laterality Date    [place] LAP,DIAGNOSTIC ABDOMEN N/A 2/21/2021    Procedure: LAPAROSCOPY DIAGNOSTIC, exploratory laparotomy, lysis of adhesions, REDUCTION OF INTERNAL HERNIA;  Surgeon: Janet Dubose MD;  [facility];  Service: General       Current Outpatient Medications   Medication Sig Dispense Refill    Calcium Carb-Cholecalciferol (CALCIUM 1000 + D) 1000-800 MG-UNIT TABS Take by mouth daily        guaifenesin-codeine (GUAIFENESIN AC) 100-10 MG/5ML liquid Take 5 mL by mouth 3 (three) times a day as needed for cough (Patient not taking: Reported on 1/16/2020) 120 mL 0    Iron-Vitamin C (IRON 100/C) 100-250 MG TABS Take by mouth once a week        LORazepam (ATIVAN) 0 5 mg tablet Take 1 tablet (0 5 mg total) by mouth as needed for anxiety (Launch) Take as need for dental work 30 tablet 0    Multiple Vitamin (MULTI-VITAMIN DAILY) TABS Take 1 tablet by mouth daily      mupirocin (BACTROBAN) 2 % ointment Apply topically 2 (two) times a day (Patient not taking: Reported on 3/15/2021) 22 g 0    neomycin-polymyxin-hydrocortisone (CORTISPORIN) 0 35%-10,000 units/mL-1% otic suspension Administer 4 drops into the left ear 4 (four) times a day 10 mL 0    Omega-3 Fatty Acids (FISH OIL) 645 MG CAPS Take by mouth once a week        oxyCODONE-acetaminophen (PERCOCET) 5-325 mg per tablet Take 1 tablet by mouth every 6 (six) hours as needed for severe pain for up to 10 dosesMax Daily Amount: 4 tablets (Patient not taking: Reported on 3/15/2021) 10 tablet 0    pramipexole (MIRAPEX) 0 25 mg tablet Take 1 tablet (0 25 mg total) by mouth daily at bedtime as needed (restless leg) 90 tablet 3    tobramycin-dexamethasone (TOBRADEX) ophthalmic suspension Administer 1 drop to the right eye every 4 (four) hours while awake (Patient not taking: Reported on 1/16/2020) 5 mL 1    tretinoin (RETIN-A) 0 025 % cream Apply thin film topically once daily 45 g 3     No current facility-administered medications for this visit  No Known Allergies    Review of Systems   Constitutional: Negative for chills and fever  HENT: Positive for ear pain  Negative for congestion, drooling, ear discharge, nosebleeds and sinus pain           Left   Eyes: Negative for photophobia, pain and redness  Respiratory: Negative for apnea  There were no vitals filed for this visit  It was my intent to perform this visit via video technology but the patient was not able to do a video connection so the visit was completed via audio telephone only  I spent 10 minutes directly with the patient during this visit    VIRTUAL VISIT DISCLAIMER    Audicandice Yessi acknowledges that she has consented to an online visit or consultation  She understands that the online visit is based solely on information provided by her, and that, in the absence of a face-to-face physical evaluation by the physician, the diagnosis she receives is both limited and provisional in terms of accuracy and completeness  This is not intended to replace a full medical face-to-face evaluation by the physician  Jignesh Dickson understands and accepts these terms

## 2021-06-17 ENCOUNTER — TELEPHONE (OUTPATIENT)
Dept: FAMILY MEDICINE CLINIC | Facility: CLINIC | Age: 56
End: 2021-06-17

## 2021-06-17 NOTE — TELEPHONE ENCOUNTER
Patient is requesting a call back as she received a bill for Telemedicine Visit on 5/20/2021 w/ Dr Edilberto Jewell  Patient states they did play phone tag but had not actually have a virtual visit  She also states there is a price discrepancy as her co-pay is $15 00 and was charged $25 00   Patient can be reached at the number below;      [phone]

## 2021-06-23 ENCOUNTER — HOSPITAL ENCOUNTER (OUTPATIENT)
Dept: RADIOLOGY | Facility: HOSPITAL | Age: 56
Discharge: HOME | End: 2021-06-23
Payer: COMMERCIAL

## 2021-06-23 DIAGNOSIS — N28.1 RENAL CYST, LEFT: ICD-10-CM

## 2021-06-23 PROCEDURE — 76770 US EXAM ABDO BACK WALL COMP: CPT

## 2021-06-24 ENCOUNTER — OFFICE VISIT (OUTPATIENT)
Dept: FAMILY MEDICINE CLINIC | Facility: CLINIC | Age: 56
End: 2021-06-24
Payer: COMMERCIAL

## 2021-06-24 VITALS
DIASTOLIC BLOOD PRESSURE: 64 MMHG | SYSTOLIC BLOOD PRESSURE: 106 MMHG | OXYGEN SATURATION: 98 % | HEIGHT: 66 IN | WEIGHT: 169.6 LBS | RESPIRATION RATE: 18 BRPM | TEMPERATURE: 98.6 F | BODY MASS INDEX: 27.26 KG/M2 | HEART RATE: 87 BPM

## 2021-06-24 DIAGNOSIS — H92.02 OTALGIA OF LEFT EAR: ICD-10-CM

## 2021-06-24 DIAGNOSIS — H91.93 HEARING DECREASED, BILATERAL: ICD-10-CM

## 2021-06-24 DIAGNOSIS — H69.82 EUSTACHIAN TUBE DYSFUNCTION, LEFT: Primary | ICD-10-CM

## 2021-06-24 DIAGNOSIS — G25.81 RESTLESS LEG SYNDROME: ICD-10-CM

## 2021-06-24 DIAGNOSIS — F41.8 SITUATIONAL ANXIETY: ICD-10-CM

## 2021-06-24 PROBLEM — H69.92 EUSTACHIAN TUBE DYSFUNCTION, LEFT: Status: ACTIVE | Noted: 2021-06-24

## 2021-06-24 PROCEDURE — 99214 OFFICE O/P EST MOD 30 MIN: CPT | Performed by: FAMILY MEDICINE

## 2021-06-24 NOTE — PROGRESS NOTES
Subjective:           Problem List Items Addressed This Visit        Nervous and Auditory    Eustachian tube dysfunction, left - Primary       Other    Situational anxiety    Restless leg syndrome    Hearing decreased, bilateral    Otalgia of left ear        Tympanostomy mild flattening R>L L normal      No orders of the defined types were placed in this encounter  Patient Instructions   Afrin 1 spray daily x 3 days  Flonase 1 spray daily x 7 days rinse mouth after use   ENT Audiology if needed    BMI Counseling: Body mass index is 27 37 kg/m²  Discussed the patient's BMI with her  The [address]    Chief Complaint   Patient presents with    Need For Referral     To audiology  Previously prescribed abx for left ear infection  Now presents with hearing changes   Reports some apparent hearing loss, while at the same time other sounds appear "amplified "      HPI Audiology evaluation follow up H/O RLS Anxiety SBO Cystocele rectocele    /64 (BP Location: Left arm, Patient Position: Sitting, Cuff Size: Standard)   Pulse 87   Temp 98 6 °F (37 °C) (Tympanic)   Resp 18   Ht 5' 6" (1 676 m)   Wt 76 9 kg (169 lb 9 6 oz)   LMP 06/22/2016   SpO2 98%   BMI 27 37 kg/m²       No Known Allergies    Current Outpatient Medications on File Prior to Visit   Medication Sig Dispense Refill    Calcium Carb-Cholecalciferol (CALCIUM 1000 + D) 1000-800 MG-UNIT TABS Take by mouth daily        Iron-Vitamin C (IRON 100/C) 100-250 MG TABS Take by mouth once a week        LORazepam (ATIVAN) 0 5 mg tablet Take 1 tablet (0 5 mg total) by mouth as needed for anxiety (Launch) Take as need for dental work 30 tablet 0    Multiple Vitamin (MULTI-VITAMIN DAILY) TABS Take 1 tablet by mouth daily      pramipexole (MIRAPEX) 0 25 mg tablet Take 1 tablet (0 25 mg total) by mouth daily at bedtime as needed (restless leg) 90 tablet 3    tretinoin (RETIN-A) 0 025 % cream Apply thin film topically once daily 45 g 3    guaifenesin-codeine (GUAIFENESIN AC) 100-10 MG/5ML liquid Take 5 mL by mouth 3 (three) times a day as needed for cough (Patient not taking: Reported on 1/16/2020) 120 mL 0    mupirocin (BACTROBAN) 2 % ointment Apply topically 2 (two) times a day (Patient not taking: Reported on 3/15/2021) 22 g 0    neomycin-polymyxin-hydrocortisone (CORTISPORIN) 0 35%-10,000 units/mL-1% otic suspension Administer 4 drops into the left ear 4 (four) times a day (Patient not taking: Reported on 6/24/2021) 10 mL 0    Omega-3 Fatty Acids (FISH OIL) 645 MG CAPS Take by mouth once a week        oxyCODONE-acetaminophen (PERCOCET) 5-325 mg per tablet Take 1 tablet by mouth every 6 (six) hours as needed for severe pain for up to 10 dosesMax Daily Amount: 4 tablets (Patient not taking: Reported on 3/15/2021) 10 tablet 0    tobramycin-dexamethasone (TOBRADEX) ophthalmic suspension Administer 1 drop to the right eye every 4 (four) hours while awake (Patient not taking: Reported on 1/16/2020) 5 mL 1     No current facility-administered medications on file prior to visit  Past Medical History:   Diagnosis Date    Lyme disease     Small bowel obstruction ([address] )        Past Surgical History:   Procedure Laterality Date    [place] LAP,DIAGNOSTIC ABDOMEN N/A 2/21/2021    Procedure: LAPAROSCOPY DIAGNOSTIC, exploratory laparotomy, lysis of adhesions, REDUCTION OF INTERNAL HERNIA;  Surgeon: Carlene Galvez MD;  Location: [address] [place] [address];  Service: General          reports that she has never smoked  She has never used smokeless tobacco  She reports current alcohol use  She reports that she does not use drugs  reports that she has never smoked  She has never used smokeless tobacco         Review of Systems   Constitutional: Negative for chills and fever  HENT: Positive for ear pain and hearing loss  Negative for congestion, drooling, ear discharge, nosebleeds and sinus pain  Left   Eyes: Negative for photophobia, pain and redness  Respiratory: Negative for apnea  Gastrointestinal: Negative for blood in stool  Genitourinary: Negative for hematuria, urgency and vaginal discharge  Musculoskeletal: Negative for gait problem  Neurological: Negative for seizures, facial asymmetry and light-headedness  Psychiatric/Behavioral: Negative for behavioral problems and confusion  Physical Exam  Constitutional:       Appearance: She is well-developed  HENT:      Head: Normocephalic and atraumatic  Right Ear: Tympanic membrane, ear canal and external ear normal  There is no impacted cerumen  Left Ear: Ear canal and external ear normal  There is no impacted cerumen  Ears:      Comments:  Retraction TM L > R     Mouth/Throat:      Mouth: Mucous membranes are dry  Eyes:      General: No scleral icterus  Conjunctiva/sclera: Conjunctivae normal       Pupils: Pupils are equal, round, and reactive to light  Neck:      Thyroid: No thyromegaly  Vascular: No JVD  Cardiovascular:      Rate and Rhythm: Normal rate and regular rhythm  Heart sounds: No murmur heard  Pulmonary:      Effort: Pulmonary effort is normal       Breath sounds: Normal breath sounds  No stridor  Abdominal:      General: Abdomen is flat  There is no distension  Tenderness: There is no guarding  Musculoskeletal:         General: No deformity  Lymphadenopathy:      Cervical: No cervical adenopathy  Skin:     General: Skin is dry  Capillary Refill: Capillary refill takes less than 2 seconds  Findings: No erythema  Neurological:      General: No focal deficit present  Mental Status: She is oriented to person, place, and time  Cranial Nerves: No cranial nerve deficit  Psychiatric:         Behavior: Behavior normal          Thought Content:  Thought content normal          Judgment: Judgment normal

## 2021-06-24 NOTE — PATIENT INSTRUCTIONS
Afrin 1 spray daily x 3 days   Flonase 1 spray daily x 7 days rinse mouth after use   ENT Audiology if needed

## 2021-07-01 ENCOUNTER — HOSPITAL ENCOUNTER (EMERGENCY)
Facility: HOSPITAL | Age: 56
Discharge: HOME | End: 2021-07-01
Attending: EMERGENCY MEDICINE | Admitting: EMERGENCY MEDICINE
Payer: OTHER MISCELLANEOUS

## 2021-07-01 VITALS
DIASTOLIC BLOOD PRESSURE: 83 MMHG | SYSTOLIC BLOOD PRESSURE: 133 MMHG | WEIGHT: 170 LBS | BODY MASS INDEX: 27.32 KG/M2 | HEIGHT: 66 IN | RESPIRATION RATE: 20 BRPM | TEMPERATURE: 98.2 F | OXYGEN SATURATION: 100 % | HEART RATE: 102 BPM
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
## 2021-07-01 DIAGNOSIS — Z77.21 EXPOSURE TO BLOOD OR BODY FLUID: Primary | ICD-10-CM

## 2021-07-01 LAB
ALT SERPL W P-5'-P-CCNC: 37 U/L (ref 12–78)
HBV SURFACE AB SER-ACNC: 265.95 MIU/ML
HBV SURFACE AG SER QL: NORMAL
HCV AB SER QL: NORMAL

## 2021-07-01 PROCEDURE — 99283 EMERGENCY DEPT VISIT LOW MDM: CPT

## 2021-07-01 PROCEDURE — 99282 EMERGENCY DEPT VISIT SF MDM: CPT | Performed by: PHYSICIAN ASSISTANT

## 2021-07-01 PROCEDURE — 87389 HIV-1 AG W/HIV-1&-2 AB AG IA: CPT | Performed by: PHYSICIAN ASSISTANT

## 2021-07-01 PROCEDURE — 86706 HEP B SURFACE ANTIBODY: CPT | Performed by: PHYSICIAN ASSISTANT

## 2021-07-01 PROCEDURE — 36415 COLL VENOUS BLD VENIPUNCTURE: CPT | Performed by: PHYSICIAN ASSISTANT

## 2021-07-01 PROCEDURE — 84460 ALANINE AMINO (ALT) (SGPT): CPT | Performed by: PHYSICIAN ASSISTANT

## 2021-07-01 PROCEDURE — 86803 HEPATITIS C AB TEST: CPT | Performed by: PHYSICIAN ASSISTANT

## 2021-07-01 PROCEDURE — 90715 TDAP VACCINE 7 YRS/> IM: CPT | Performed by: PHYSICIAN ASSISTANT

## 2021-07-01 PROCEDURE — 90471 IMMUNIZATION ADMIN: CPT

## 2021-07-01 PROCEDURE — 87340 HEPATITIS B SURFACE AG IA: CPT | Performed by: PHYSICIAN ASSISTANT

## 2021-07-01 RX ADMIN — TETANUS TOXOID, REDUCED DIPHTHERIA TOXOID AND ACELLULAR PERTUSSIS VACCINE, ADSORBED 0.5 ML: 5; 2.5; 8; 8; 2.5 SUSPENSION INTRAMUSCULAR at 14:08

## 2021-07-01 NOTE — ED PROVIDER NOTES
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 Taking?    Calcium Carb-Cholecalciferol (CALCIUM 1000 + D) 1000-800 MG-UNIT TABS  Self Yes No   Sig: Take by mouth daily     Iron-Vitamin C (IRON 100/C) 100-250 MG TABS  Self Yes No   Sig: Take by mouth once a week     LORazepam (ATIVAN) 0 5 mg tablet  Self No No   Sig: Take 1 tablet (0 5 mg total) by mouth as needed for anxiety (Launch) Take as need for dental work   Multiple Vitamin (MULTI-VITAMIN DAILY) TABS  Self Yes No   Sig: Take 1 tablet by mouth daily   Omega-3 Fatty Acids (FISH OIL) 645 MG CAPS  Self Yes No   Sig: Take by mouth once a week     guaifenesin-codeine (GUAIFENESIN AC) 100-10 MG/5ML liquid  Self No No   Sig: Take 5 mL by mouth 3 (three) times a day as needed for cough   Patient not taking: Reported on 1/16/2020   mupirocin (BACTROBAN) 2 % ointment   No No   Sig: Apply topically 2 (two) times a day   Patient not taking: Reported on 3/15/2021   neomycin-polymyxin-hydrocortisone (CORTISPORIN) 0 35%-10,000 units/mL-1% otic suspension   No No   Sig: Administer 4 drops into the left ear 4 (four) times a day   Patient not taking: Reported on 6/24/2021   oxyCODONE-acetaminophen (PERCOCET) 5-325 mg per tablet   No No   Sig: Take 1 tablet by mouth every 6 (six) hours as needed for severe pain for up to 10 dosesMax Daily Amount: 4 tablets   Patient not taking: Reported on 3/15/2021   pramipexole (MIRAPEX) 0 25 mg tablet   No No   Sig: Take 1 tablet (0 25 mg total) by mouth daily at bedtime as needed (restless leg)   tobramycin-dexamethasone (TOBRADEX) ophthalmic suspension  Self No No   Sig: Administer 1 drop to the right eye every 4 (four) hours while awake   Patient not taking: Reported on 1/16/2020   tretinoin (RETIN-A) 0 025 % cream   No No   Sig: Apply thin film topically once daily      Facility-Administered Medications: None       Past Medical History:   Diagnosis Date    Lyme disease     Small bowel obstruction (HCC)        Past Surgical History:   Procedure Laterality Date    VT LAP,DIAGNOSTIC ABDOMEN N/A 2/21/2021    Procedure: LAPAROSCOPY DIAGNOSTIC, exploratory laparotomy, lysis of adhesions, REDUCTION OF INTERNAL HERNIA;  Surgeon: Joann Leon MD;  [facility];  Service: General       Family History   Problem Relation Age of Onset    Atrial fibrillation Mother     Hyperlipidemia Mother     Heart disease Father     Hypertension Father     Hyperlipidemia Father     Diabetes Father     Prostate cancer Maternal Grandfather 48    Prostate cancer Maternal Uncle 61    Breast cancer Paternal Aunt 28     I have reviewed and agree with the history as documented  E-Cigarette/Vaping    E-Cigarette Use Never User      E-Cigarette/Vaping Substances    Nicotine No     THC No     CBD No     Flavoring No     Other No     Unknown No      Social History     Tobacco Use    Smoking status: Never Smoker    Smokeless tobacco: Never Used   Vaping Use    Vaping Use: Never used   Substance Use Topics    Alcohol use: Yes     Comment: occasional    Drug use: No       Review of Systems   Constitutional: Negative for diaphoresis, fatigue and fever     HENT: Negative for congestion, ear pain, nosebleeds and sore throat  Eyes: Negative for photophobia, pain, discharge and visual disturbance  Respiratory: Negative for cough, choking, chest tightness, shortness of breath and wheezing  Cardiovascular: Negative for chest pain and palpitations  Gastrointestinal: Negative for abdominal distention, abdominal pain, diarrhea and vomiting  Genitourinary: Negative for dysuria, flank pain and frequency  Musculoskeletal: Negative for back pain, gait problem and joint swelling  Skin: Positive for wound  Negative for color change and rash  Neurological: Negative for dizziness, syncope and headaches  Psychiatric/Behavioral: Negative for behavioral problems and confusion  The patient is not nervous/anxious  All other systems reviewed and are negative  Physical Exam  Physical Exam  Vitals and nursing note reviewed  Constitutional:       Appearance: She is well-developed  HENT:      Head: Normocephalic and atraumatic  Right Ear: External ear normal       Left Ear: External ear normal       Nose: Nose normal    Eyes:      Conjunctiva/sclera: Conjunctivae normal       Pupils: Pupils are equal, round, and reactive to light  Cardiovascular:      Rate and Rhythm: Normal rate and regular rhythm  Pulmonary:      Effort: Pulmonary effort is normal       Breath sounds: Normal breath sounds  Abdominal:      General: Bowel sounds are normal       Palpations: Abdomen is soft  Tenderness: There is no abdominal tenderness  Musculoskeletal:        Hands:       Cervical back: Normal range of motion and neck supple  Skin:     General: Skin is warm  Capillary Refill: Capillary refill takes less than 2 seconds  Neurological:      General: No focal deficit present  Mental Status: She is alert and oriented to person, place, and time  Mental status is at baseline     Psychiatric:         Behavior: Behavior normal          Vital Signs  ED Triage Vitals [07/01/21 1326]   Temperature Pulse Respirations Blood Pressure SpO2   98 2 °F (36 8 °C) 102 20 133/83 100 %      Temp Source Heart Rate Source Patient Position - Orthostatic VS BP Location FiO2 (%)   Oral Monitor Lying Right arm --      Pain Score       No Pain           Vitals:    07/01/21 1326   BP: 133/83   Pulse: 102   Patient Position - Orthostatic VS: Lying         Visual Acuity      ED Medications  Medications   tetanus-diphtheria-acellular pertussis (BOOSTRIX) IM injection 0 5 mL (has no administration in time range)       Diagnostic Studies  Results Reviewed     Procedure Component Value Units Date/Time    HIV 1/2 Antigen/Antibody (4th Generation) w Reflex [facility] [[id number]] Collected: 07/01/21 1358    Lab Status: In process Specimen: Blood from Arm, Right Updated: 07/01/21 1402    ALT [[id number]] Collected: 07/01/21 1358    Lab Status: In process Specimen: Blood from Arm, Right Updated: 07/01/21 1402    Hepatitis B surface antigen [[id number]] Collected: 07/01/21 1358    Lab Status: In process Specimen: Blood from Arm, Right Updated: 07/01/21 1402    Hepatitis C antibody [[id number]] Collected: 07/01/21 1358    Lab Status: In process Specimen: Blood from Arm, Right Updated: 07/01/21 1402    Hepatitis B surface antibody [[id number]] Collected: 07/01/21 1358    Lab Status: In process Specimen: Blood from Arm, Right Updated: 07/01/21 1402                 No orders to display              Procedures  Procedures         ED Course                                           MDM    Disposition  Final diagnoses:   Exposure to blood or body fluid   Needlestick injury accident with exposure to body fluid     Time reflects when diagnosis was documented in both MDM as applicable and the Disposition within this note     Time User Action Codes Description Comment    7/1/2021  2:03 PM David Jules [Z77 21] Exposure to blood or body fluid     7/1/2021  2:03 PM [place], [address] [place] Add [id number]  1XXA] Needlestick injury accident with exposure to body fluid       ED Disposition     ED Disposition Condition Date/Time Comment    Discharge Stable Thu Jul 1, 2021  2:03 PM [address] [place] discharge to home/self care  Follow-up Information     Follow up With Specialties Details Why [address] [facility]    [address] [place] [address]  [phone]          Patient's Medications   Discharge Prescriptions    No medications on file     No discharge procedures on file      PDMP Review       Value Time User    PDMP Reviewed  Yes 2/21/2021  6:43 AM Estefania Morley PA-C          ED Provider  Electronically Signed by           Leo Mcadams PA-C  07/01/21 3026

## 2021-07-02 LAB — HIV 1+2 AB+HIV1 P24 AG SERPL QL IA: NORMAL

## 2021-07-13 ENCOUNTER — OFFICE VISIT (OUTPATIENT)
Dept: OBGYN CLINIC | Facility: HOSPITAL | Age: 56
End: 2021-07-13
Payer: OTHER MISCELLANEOUS

## 2021-07-13 ENCOUNTER — HOSPITAL ENCOUNTER (OUTPATIENT)
Dept: RADIOLOGY | Facility: HOSPITAL | Age: 56
Discharge: HOME | End: 2021-07-13
Attending: ORTHOPAEDIC SURGERY
Payer: COMMERCIAL

## 2021-07-13 VITALS
WEIGHT: 169 LBS | BODY MASS INDEX: 27.16 KG/M2 | HEART RATE: 80 BPM | HEIGHT: 66 IN | SYSTOLIC BLOOD PRESSURE: 112 MMHG | DIASTOLIC BLOOD PRESSURE: 79 MMHG

## 2021-07-13 DIAGNOSIS — M25.512 ACUTE PAIN OF LEFT SHOULDER: Primary | ICD-10-CM

## 2021-07-13 DIAGNOSIS — S46.812A STRAIN OF DELTOID MUSCLE, LEFT, INITIAL ENCOUNTER: ICD-10-CM

## 2021-07-13 DIAGNOSIS — M25.512 ACUTE PAIN OF LEFT SHOULDER: ICD-10-CM

## 2021-07-13 PROCEDURE — 73030 X-RAY EXAM OF SHOULDER: CPT

## 2021-07-13 PROCEDURE — 99243 OFF/OP CNSLTJ NEW/EST LOW 30: CPT | Performed by: ORTHOPAEDIC SURGERY

## 2021-07-13 NOTE — PROGRESS NOTES
Assessment  [facility]    Discussion and Plan:    ***    Subjective:   Patient ID: Patrick Modi is a 63 y o  female      HPI    The patient presents with a chief complaint of left shoulder pain  The pain began {NUMBERS; 0-10:35648} {Time; day/wk/mo/yr(s):9076} ago and {IS/ IS NOT:34436} associated with an acute injury  ***  The patient describes the pain as {PAIN QUALITY:99288} in intensity,  {desc; discomfort timing chest:14459} in timing, and localizes the pain to the  {Right/left:19066} {Shoulder crepitus locations:40083}  The pain is worse with {Aggravating factors extremity pain:30496} and relieved by {Pain upper relieved by:91319}  The pain {IS/ IS NOT:61898} associated with numbness and tingling  The pain {IS/ IS NOT:96003} associated with constitutional symptoms  The patient {IS/ IS NOT:71805} awoken at night by the pain  The patient has had *** treatment  ***        The following portions of the patient's history were reviewed and updated as appropriate: allergies, current medications, past family history, past medical history, past social history, past surgical history and problem list     Review of Systems    Objective:  /79   Pulse 80   Ht 5' 6" (1 676 m)   Wt 76 7 kg (169 lb)   LMP 06/22/2016   BMI 27 28 kg/m²       Ortho Exam    Physical Exam      I have personally reviewed pertinent films in PACS and my interpretation is as follows      ***

## 2021-07-13 NOTE — LETTER
July 14, 2021     Ceci Diez MD  [id number]  [address] [address]    Patient: Teja Galdamez   YOB: 1965   Date of Visit: 7/13/2021       Dear Dr Chris Dahl:    Thank you for referring Ora Martin to me for evaluation  Below are my notes for this consultation  If you have questions, please do not hesitate to call me  I look forward to following your patient along with you  Sincerely,        Mohinder Diop MD        CC: No Recipients  Mohinder Diop MD  7/13/2021  4:17 PM  Signed  I personally examined the patient and reviewed the history provided  I agree with the note and the assessment and plan by Dr Kamala Sher MD      Assessment:     resolved left shoulder deltoid strain    Plan:     patient has a benign examination today and has recover from her injury, if she has persistent or recurrent symptoms in the future be happy to re-evaluate her but otherwise we do not need to continue to follow her for this self-limiting injury        Assessment  Diagnoses and all orders for this visit:    Acute pain of left shoulder  -     XR shoulder 2+ vw left; Future    Strain of deltoid muscle, left, initial encounter        Discussion and Plan:    Patient continues to improve  Still has some subjective weakness, will continue to observe with conservative treatment at this time NSAID's, tylenol, and ice as needed  Patient should follow up if pain worsens or returns and consideration will be given to more advanced imaging and physical therapy at that time if needed  Subjective:   Patient ID: Teja Galdamez is a 63 y o  female      Patient reports that 6 weeks ago she had an injury at the workplace that occurred while moving a patient  She was moving the patient when the transfer board got stuck and she had a jarring injury to left shoulder  She had severe pain in the immediate period after the injury, somewhat responsive to heat and ibuprofen/NSAID's   Pain worse with overhead movements, worse with overuse of left arm  She was referred to orthopedics for evaluation  In the time that she spent between injury and appointment with orthopedics, she reports that her left shoulder pain has significantly improved  She still has some subjective weakness to left shoulder with use, and mild pain laterally on shoulder, but is otherwise able to use shoulder  The following portions of the patient's history were reviewed and updated as appropriate: allergies, current medications, past family history, past medical history, past social history, past surgical history and problem list     Review of Systems   Constitutional: Negative for chills and fever  HENT: Negative for sinus pain and sore throat  Eyes: Negative for pain  Respiratory: Negative for shortness of breath  Cardiovascular: Negative for chest pain  Gastrointestinal: Negative for abdominal pain, nausea and vomiting  Endocrine: Negative for cold intolerance and heat intolerance  Genitourinary: Negative for dysuria  Musculoskeletal: Positive for arthralgias and myalgias  Skin: Negative for color change  Allergic/Immunologic: Negative for environmental allergies and food allergies  Neurological: Positive for numbness (Occasional paresthesias radiating down arm)  Negative for weakness  Objective:  /79   Pulse 80   Ht 5' 6" (1 676 m)   Wt 76 7 kg (169 lb)   LMP 06/22/2016   BMI 27 28 kg/m²       Left Shoulder Exam     Tenderness   Left shoulder tenderness location: Some mild tenderness laterally on left shoulder near deltoid insertion on humerus  Range of Motion   The patient has normal left shoulder ROM  Muscle Strength   The patient has normal left shoulder strength  Tests   Torres test: negative  Impingement: negative    Other   Erythema: absent  Sensation: normal     Comments:  Fingers warm well perfused            Physical Exam  Vitals reviewed  HENT:      Head: Normocephalic  Eyes:      Conjunctiva/sclera: Conjunctivae normal    Pulmonary:      Effort: Pulmonary effort is normal    Musculoskeletal:         General: No deformity  Normal range of motion  Skin:     General: Skin is warm and dry  Neurological:      Mental Status: She is alert  Sensory: No sensory deficit  Motor: No weakness  Psychiatric:         Mood and Affect: Mood normal          Behavior: Behavior normal            I have personally reviewed pertinent films in PACS and my interpretation is as follows  Left shoulder xray shows no acute fracture or dislocation  No significant degenerative changes left shoulder

## 2021-08-27 ENCOUNTER — TELEPHONE (OUTPATIENT)
Dept: FAMILY MEDICINE CLINIC | Facility: CLINIC | Age: 56
End: 2021-08-27

## 2021-08-27 DIAGNOSIS — L03.119 CELLULITIS OF LOWER EXTREMITY, UNSPECIFIED LATERALITY: Primary | ICD-10-CM

## 2021-08-27 RX ORDER — CEPHALEXIN 500 MG/1
500 CAPSULE ORAL EVERY 12 HOURS SCHEDULED
Qty: 14 CAPSULE | Refills: 0 | Status: SHIPPED | OUTPATIENT
Start: 2021-08-27 | End: 2021-09-03

## 2021-08-27 NOTE — TELEPHONE ENCOUNTER
S/w Patient to inform her that Rx is a [employer] and can not be changed per previous message   Patient is okay with this

## 2021-10-15 ENCOUNTER — TELEMEDICINE (OUTPATIENT)
Dept: FAMILY MEDICINE CLINIC | Facility: CLINIC | Age: 56
End: 2021-10-15
Payer: COMMERCIAL

## 2021-10-15 DIAGNOSIS — J06.9 VIRAL UPPER RESPIRATORY TRACT INFECTION: Primary | ICD-10-CM

## 2021-10-15 PROCEDURE — 99441 PR PHYS/QHP TELEPHONE EVALUATION 5-10 MIN: CPT | Performed by: FAMILY MEDICINE

## 2021-12-01 ENCOUNTER — TELEPHONE (OUTPATIENT)
Dept: GASTROENTEROLOGY | Facility: CLINIC | Age: 56
End: 2021-12-01

## 2021-12-02 ENCOUNTER — PREP FOR PROCEDURE (OUTPATIENT)
Dept: GASTROENTEROLOGY | Facility: CLINIC | Age: 56
End: 2021-12-02

## 2021-12-02 DIAGNOSIS — Z12.11 SCREENING FOR COLON CANCER: Primary | ICD-10-CM

## 2021-12-03 ENCOUNTER — IMMUNIZATIONS (OUTPATIENT)
Dept: FAMILY MEDICINE CLINIC | Facility: HOSPITAL | Age: 56
End: 2021-12-03

## 2021-12-03 DIAGNOSIS — Z23 ENCOUNTER FOR IMMUNIZATION: Primary | ICD-10-CM

## 2021-12-03 PROCEDURE — 91306 COVID-19 MODERNA VACC 0.25 ML BOOSTER: CPT

## 2021-12-03 PROCEDURE — 0064A COVID-19 MODERNA VACC 0.25 ML BOOSTER: CPT

## 2022-01-28 ENCOUNTER — TELEPHONE (OUTPATIENT)
Dept: PREADMISSION TESTING | Facility: HOSPITAL | Age: 57
End: 2022-01-28

## 2022-02-03 NOTE — PRE-PROCEDURE INSTRUCTIONS
Pre-Surgery Instructions:   Medication Instructions    Calcium Carb-Cholecalciferol (CALCIUM 1000 + D) 1000-800 MG-UNIT TABS Patient was instructed by Physician and understands   Iron-Vitamin C (IRON 100/C) 100-250 MG TABS Patient was instructed by Physician and understands   LORazepam (ATIVAN) 0 5 mg tablet Patient was instructed by Physician and understands   Multiple Vitamin (MULTI-VITAMIN DAILY) TABS Patient was instructed by Physician and understands   pramipexole (MIRAPEX) 0 25 mg tablet Patient was instructed by Physician and understands   tretinoin (RETIN-A) 0 025 % cream Patient was instructed by Physician and understands

## 2022-02-10 ENCOUNTER — ANESTHESIA (OUTPATIENT)
Dept: GASTROENTEROLOGY | Facility: AMBULARY SURGERY CENTER | Age: 57
End: 2022-02-10

## 2022-02-10 ENCOUNTER — HOSPITAL ENCOUNTER (OUTPATIENT)
Dept: GASTROENTEROLOGY | Facility: AMBULARY SURGERY CENTER | Age: 57
Setting detail: OUTPATIENT SURGERY
Discharge: HOME | End: 2022-02-10
Attending: INTERNAL MEDICINE | Admitting: INTERNAL MEDICINE
Payer: COMMERCIAL

## 2022-02-10 ENCOUNTER — ANESTHESIA EVENT (OUTPATIENT)
Dept: GASTROENTEROLOGY | Facility: AMBULARY SURGERY CENTER | Age: 57
End: 2022-02-10

## 2022-02-10 VITALS
HEIGHT: 66 IN | SYSTOLIC BLOOD PRESSURE: 104 MMHG | BODY MASS INDEX: 27.16 KG/M2 | HEART RATE: 64 BPM | TEMPERATURE: 96.1 F | RESPIRATION RATE: 18 BRPM | OXYGEN SATURATION: 99 % | WEIGHT: 169 LBS | DIASTOLIC BLOOD PRESSURE: 64 MMHG

## 2022-02-10 DIAGNOSIS — R19.4 ENCOUNTER FOR DIAGNOSTIC COLONOSCOPY DUE TO CHANGE IN BOWEL HABITS: ICD-10-CM

## 2022-02-10 PROBLEM — Z83.719 FAMILY HISTORY OF COLONIC POLYPS: Status: ACTIVE | Noted: 2022-02-10

## 2022-02-10 PROBLEM — Z83.71 FAMILY HISTORY OF COLONIC POLYPS: Status: ACTIVE | Noted: 2022-02-10

## 2022-02-10 PROCEDURE — 45378 DIAGNOSTIC COLONOSCOPY: CPT | Performed by: INTERNAL MEDICINE

## 2022-02-10 RX ORDER — PROPOFOL 10 MG/ML
INJECTION, EMULSION INTRAVENOUS CONTINUOUS PRN
Status: DISCONTINUED | OUTPATIENT
Start: 2022-02-10 | End: 2022-02-10

## 2022-02-10 RX ORDER — SODIUM CHLORIDE, SODIUM LACTATE, POTASSIUM CHLORIDE, CALCIUM CHLORIDE 600; 310; 30; 20 MG/100ML; MG/100ML; MG/100ML; MG/100ML
125 INJECTION, SOLUTION INTRAVENOUS CONTINUOUS
Status: DISCONTINUED | OUTPATIENT
Start: 2022-02-10 | End: 2022-02-14 | Stop reason: HOSPADM

## 2022-02-10 RX ORDER — LIDOCAINE HYDROCHLORIDE 10 MG/ML
INJECTION, SOLUTION EPIDURAL; INFILTRATION; INTRACAUDAL; PERINEURAL AS NEEDED
Status: DISCONTINUED | OUTPATIENT
Start: 2022-02-10 | End: 2022-02-10

## 2022-02-10 RX ORDER — PROPOFOL 10 MG/ML
INJECTION, EMULSION INTRAVENOUS AS NEEDED
Status: DISCONTINUED | OUTPATIENT
Start: 2022-02-10 | End: 2022-02-10

## 2022-02-10 RX ADMIN — PROPOFOL 140 MCG/KG/MIN: 10 INJECTION, EMULSION INTRAVENOUS at 08:27

## 2022-02-10 RX ADMIN — PROPOFOL 100 MG: 10 INJECTION, EMULSION INTRAVENOUS at 08:27

## 2022-02-10 RX ADMIN — LIDOCAINE HYDROCHLORIDE 50 MG: 10 INJECTION, SOLUTION EPIDURAL; INFILTRATION; INTRACAUDAL; PERINEURAL at 08:27

## 2022-02-10 RX ADMIN — SODIUM CHLORIDE, SODIUM LACTATE, POTASSIUM CHLORIDE, AND CALCIUM CHLORIDE: .6; .31; .03; .02 INJECTION, SOLUTION INTRAVENOUS at 08:25

## 2022-02-10 NOTE — ANESTHESIA PREPROCEDURE EVALUATION
Procedure:  COLONOSCOPY    Relevant Problems   GI/HEPATIC   (+) Cyst of pancreas   (+) Small bowel obstruction (HCC)      MUSCULOSKELETAL   (+) Strain of deltoid muscle, left, initial encounter      NEURO/PSYCH   (+) Situational anxiety      Other   (+) Restless leg syndrome        Physical Exam    Airway    Mallampati score: II  TM Distance: >3 FB  Neck ROM: full     Dental   Comment: Temporary crown, awaiting implant,     Cardiovascular  Cardiovascular exam normal    Pulmonary  Pulmonary exam normal     Other Findings        Anesthesia Plan  ASA Score- 2     Anesthesia Type- IV sedation with anesthesia with ASA Monitors  Additional Monitors:   Airway Plan:           Plan Factors-    Chart reviewed  Patient summary reviewed  Induction- intravenous  Postoperative Plan-     Informed Consent- Anesthetic plan and risks discussed with patient  I personally reviewed this patient with the CRNA  Discussed and agreed on the Anesthesia Plan with the CRNA  Laura Lazar

## 2022-02-10 NOTE — H&P
History and Physical - [facility]  Ze Chavez 61 y o  female MRN: [id number]                  HPI: Ze Chavez is a 61y o  year old female who presents for colonoscopy for family history of polyps mother and brother  Last colonoscopy 7 years ago      REVIEW OF SYSTEMS: Per the HPI, and otherwise unremarkable      Historical Information   Past Medical History:   Diagnosis Date    Lyme disease     Small bowel obstruction (HCC)      Past Surgical History:   Procedure Laterality Date    COLONOSCOPY  2013    DILATION AND CURETTAGE OF UTERUS      OVARIAN CYST REMOVAL  1995    ME LAP,DIAGNOSTIC ABDOMEN N/A 2/21/2021    Procedure: LAPAROSCOPY DIAGNOSTIC, exploratory laparotomy, lysis of adhesions, REDUCTION OF INTERNAL HERNIA;  Surgeon: Natty Lux MD;  Location: [facility];  Service: General     Social History   Social History     Substance and Sexual Activity   Alcohol Use Yes    Comment: occasional     Social History     Substance and Sexual Activity   Drug Use No     Social History     Tobacco Use   Smoking Status Never Smoker   Smokeless Tobacco Never Used     Family History   Problem Relation Age of Onset    Atrial fibrillation Mother     Hyperlipidemia Mother     Heart disease Father     Hypertension Father     Hyperlipidemia Father     Diabetes Father     Prostate cancer Maternal Grandfather 48    Prostate cancer Maternal Uncle 61    Breast cancer Paternal Aunt 28       Meds/Allergies       Current Outpatient Medications:     Iron-Vitamin C (IRON 100/C) 100-250 MG TABS    Multiple Vitamin (MULTI-VITAMIN DAILY) TABS    Calcium Carb-Cholecalciferol (CALCIUM 1000 + D) 1000-800 MG-UNIT TABS    LORazepam (ATIVAN) 0 5 mg tablet    pramipexole (MIRAPEX) 0 25 mg tablet    tretinoin (RETIN-A) 0 025 % cream    Current Facility-Administered Medications:     lactated ringers infusion, 125 mL/hr, Intravenous, Continuous    No Known Allergies    Objective     /87   Pulse 64 Temp (!) 96 1 °F (35 6 °C) (Tympanic)   Resp 16   Ht 5' 6" (1 676 m)   Wt 76 7 kg (169 lb)   LMP 06/22/2016   SpO2 99%   BMI 27 28 kg/m²       PHYSICAL EXAM    Gen: NAD  Head: NCAT  CV: RRR  CHEST: Clear  ABD: soft, NT/ND  EXT: no edema      ASSESSMENT/PLAN:  This is a 61y o  year old female here for colonoscopy, and she is stable and optimized for her procedure

## 2022-03-03 ENCOUNTER — TELEPHONE (OUTPATIENT)
Dept: PLASTIC SURGERY | Facility: CLINIC | Age: 57
End: 2022-03-03

## 2022-03-31 RX ORDER — SODIUM FLUORIDE 6 MG/ML
PASTE, DENTIFRICE DENTAL
COMMUNITY
Start: 2022-01-28

## 2022-04-05 ENCOUNTER — OFFICE VISIT (OUTPATIENT)
Dept: FAMILY MEDICINE CLINIC | Facility: CLINIC | Age: 57
End: 2022-04-05
Payer: COMMERCIAL

## 2022-04-05 VITALS
HEART RATE: 88 BPM | DIASTOLIC BLOOD PRESSURE: 80 MMHG | SYSTOLIC BLOOD PRESSURE: 124 MMHG | RESPIRATION RATE: 18 BRPM | OXYGEN SATURATION: 96 % | BODY MASS INDEX: 26.03 KG/M2 | WEIGHT: 162 LBS | HEIGHT: 66 IN | TEMPERATURE: 98.2 F

## 2022-04-05 DIAGNOSIS — Z00.00 ENCOUNTER FOR WELLNESS EXAMINATION IN ADULT: Primary | ICD-10-CM

## 2022-04-05 DIAGNOSIS — Z13.220 ENCOUNTER FOR SCREENING FOR LIPID DISORDER: ICD-10-CM

## 2022-04-05 DIAGNOSIS — R53.83 FATIGUE, UNSPECIFIED TYPE: ICD-10-CM

## 2022-04-05 DIAGNOSIS — K08.89 PAIN, DENTAL: ICD-10-CM

## 2022-04-05 DIAGNOSIS — E55.9 VITAMIN D DEFICIENCY: ICD-10-CM

## 2022-04-05 DIAGNOSIS — M76.32 ILIOTIBIAL BAND SYNDROME, LEFT: ICD-10-CM

## 2022-04-05 DIAGNOSIS — Z13.1 ENCOUNTER FOR SCREENING FOR DIABETES MELLITUS: ICD-10-CM

## 2022-04-05 DIAGNOSIS — G25.81 RESTLESS LEG SYNDROME: ICD-10-CM

## 2022-04-05 PROBLEM — H92.02 OTALGIA OF LEFT EAR: Status: RESOLVED | Noted: 2021-06-24 | Resolved: 2022-04-05

## 2022-04-05 PROBLEM — R35.0 URINARY FREQUENCY: Status: RESOLVED | Noted: 2021-03-22 | Resolved: 2022-04-05

## 2022-04-05 PROCEDURE — 99386 PREV VISIT NEW AGE 40-64: CPT | Performed by: FAMILY MEDICINE

## 2022-04-05 RX ORDER — AMOXICILLIN 875 MG/1
875 TABLET, COATED ORAL 2 TIMES DAILY
Qty: 20 TABLET | Refills: 0 | Status: SHIPPED | OUTPATIENT
Start: 2022-04-05 | End: 2022-04-15

## 2022-04-05 RX ORDER — TIZANIDINE 2 MG/1
TABLET ORAL
Qty: 60 TABLET | Refills: 2 | Status: SHIPPED | OUTPATIENT
Start: 2022-04-05

## 2022-04-05 NOTE — PROGRESS NOTES
FAMILY PRACTICE OFFICE VISIT       NAME: Gino Ronquillo  AGE: 61 y o  SEX: female       : 1965        MRN: [id number]        Assessment and Plan     1  Encounter for wellness examination in adult    2  Restless leg syndrome  -     tiZANidine (ZANAFLEX) 2 mg tablet; Take 1-2 tabs qhs    3  Fatigue, unspecified type  -     CBC and differential; Future  -     Comprehensive metabolic panel; Future  -     TSH, 3rd generation; Future    4  Vitamin D deficiency  -     Vitamin D 25 hydroxy; Future    5  Encounter for screening for lipid disorder  -     Lipid Panel with Direct LDL reflex; Future    6  Encounter for screening for diabetes mellitus  -     Hemoglobin A1C; Future    7  Iliotibial band syndrome, left  -     Ambulatory Referral to Orthopedic Surgery; Future    8  Pain, dental  -     amoxicillin (AMOXIL) 875 mg tablet; Take 1 tablet (875 mg total) by mouth 2 (two) times a day for 10 days       Patient presents to establish care with our practice  She is up-to-date with health screenings  Pending mammogram and gyn exam     She will proceed with routine blood work  Start amoxicillin for chronic dental pain, follow-up with dentist closely  Referral to Orthopedic team for evaluation of left ITB syndrome/hip pain  Trial of Zanaflex p r n  restless leg syndrome  Follow-up pending labs, updates, annually and as needed  There are no Patient Instructions on file for this visit  No follow-ups on file  Discussed with the patient and all questioned fully answered  She will call me if any problems arise  M*Modal software was used to dictate this note  It may contain errors with dictating incorrect words/spelling  Please contact provider directly with any questions         Chief Complaint     Chief Complaint   Patient presents with    Establish Care    Night Time Restlessness     Arms and Legs     Teeth Grinding     Wears appliance at night    See Periodontist     Has tempory crown Left upper last tooth will be extracted May 12th    Facial Pain     Swelling- Numbness since dental work     Hip Pain     Left sided  Shooting numb pain from hip to knee occurs is pushing w/c-stretchers  See Chiropractor who thinks it could be arthritis        History of Present Illness      Annual well exam   New pt to our practice   Under dental care, pending tooth extraction on 4/12/22   Grinding teeth at night, uses mouth guard    Uses Mirapex PRN only for symptoms of restless leg  Usually pain is worse with walking and exercising all day long  No trouble falling asleep - but sometimes wakes up in the middle of the night  Remote sleep study-  Patient has snoring but no BIJAN;dxed with RLS  Magnesium and vit D on a daily bases    Pending  mammo 4/2022    GYN- pending  UTD with colonoscopy    Patient offers no complaints of chest pain, palpitations, shortness of breath or dizziness  She is under care of chiropractor of lower back and hip pain  Hip Pain         Review of Systems   Review of Systems   Constitutional: Negative  HENT: Negative  Eyes: Negative  Respiratory: Negative  Cardiovascular: Negative  Gastrointestinal: Negative  Endocrine: Negative  Musculoskeletal: Positive for arthralgias and back pain  Skin: Negative  Allergic/Immunologic: Negative  Neurological: Negative  Hematological: Negative  Psychiatric/Behavioral: Positive for sleep disturbance         Active Problem List     Patient Active Problem List   Diagnosis    Arthralgia of right acromioclavicular joint    Cyst of pancreas    Fatigue    Situational anxiety    Encounter for gynecological examination (general) (routine) without abnormal findings    Weight gain    Cystocele with small rectocele and uterine descent    Acne    Restless leg syndrome    Small bowel obstruction (HCC)    Postmenopausal bleeding    Eustachian tube dysfunction, left    Hearing decreased, bilateral    Strain of deltoid muscle, left, initial encounter    Family history of colonic polyps    Iliotibial band syndrome, left       Past Medical History:  Past Medical History:   Diagnosis Date    Lyme disease     Small bowel obstruction (HCC)        Past Surgical History:  Past Surgical History:   Procedure Laterality Date    COLONOSCOPY  2013    DILATION AND CURETTAGE OF UTERUS      OVARIAN CYST REMOVAL  1995    CT LAP,DIAGNOSTIC ABDOMEN N/A 2/21/2021    Procedure: LAPAROSCOPY DIAGNOSTIC, exploratory laparotomy, lysis of adhesions, REDUCTION OF INTERNAL HERNIA;  Surgeon: Robe Shafer MD;  Location: [facility];  Service: General       Family History:  Family History   Problem Relation Age of Onset    Atrial fibrillation Mother     Hyperlipidemia Mother     Heart disease Father     Hypertension Father     Hyperlipidemia Father     Diabetes Father     Prostate cancer Maternal Grandfather 48    Prostate cancer Maternal Uncle 61    Breast cancer Paternal Aunt 28       Social History:  Social History     Socioeconomic History    Marital status:      Spouse name: Not on file    Number of children: Not on file    Years of education: Not on file    Highest education level: Not on file   Occupational History    Not on file   Tobacco Use    Smoking status: Never Smoker    Smokeless tobacco: Never Used   Vaping Use    Vaping Use: Never used   Substance and Sexual Activity    Alcohol use: Yes     Comment: occasional    Drug use: No    Sexual activity: Yes     Partners: Male     Birth control/protection: Post-menopausal   Other Topics Concern    Not on file   Social History Narrative    Not on file     Social Determinants of Health     Financial Resource Strain: Not on file   Food Insecurity: Not on file   Transportation Needs: Not on file   Physical Activity: Not on file   Stress: Not on file   Social Connections: Not on file   Intimate Partner Violence: Not on file   Housing Stability: Not on file Objective     Vitals:    04/05/22 1618   BP: 124/80   BP Location: Left arm   Patient Position: Sitting   Cuff Size: Standard   Pulse: 88   Resp: 18   Temp: 98 2 °F (36 8 °C)   TempSrc: Temporal   SpO2: 96%   Weight: 73 5 kg (162 lb)   Height: 5' 6" (1 676 m)       Wt Readings from Last 3 Encounters:   04/05/22 73 5 kg (162 lb)   02/10/22 76 7 kg (169 lb)   07/13/21 76 7 kg (169 lb)       Physical Exam  Vitals and nursing note reviewed  Constitutional:       General: She is not in acute distress  Appearance: Normal appearance  She is well-developed  She is not ill-appearing  HENT:      Head: Normocephalic and atraumatic  Right Ear: Tympanic membrane normal       Left Ear: Tympanic membrane normal       Mouth/Throat:      Mouth: Mucous membranes are moist       Dentition: Dental tenderness and gingival swelling present  Pharynx: No posterior oropharyngeal erythema  Comments: Mild left facial/cheek swelling  Eyes:      General: No scleral icterus  Conjunctiva/sclera: Conjunctivae normal    Neck:      Thyroid: No thyromegaly  Vascular: No carotid bruit  Cardiovascular:      Rate and Rhythm: Normal rate and regular rhythm  Heart sounds: Normal heart sounds  No murmur heard  Pulmonary:      Effort: Pulmonary effort is normal  No respiratory distress  Breath sounds: Normal breath sounds  No wheezing  Abdominal:      General: Bowel sounds are normal  There is no distension or abdominal bruit  Palpations: Abdomen is soft  Tenderness: There is no abdominal tenderness  Musculoskeletal:         General: Normal range of motion  Cervical back: Neck supple  No rigidity  Right lower leg: No edema  Left lower leg: No edema  Comments: Tenderness at left anterior hip down to the left knee, reproducible tenderness with palpation of left ITB  Skin:     General: Skin is warm     Neurological:      Mental Status: She is alert and oriented to person, place, and time  Cranial Nerves: No cranial nerve deficit  Coordination: Coordination normal    Psychiatric:         Mood and Affect: Mood normal          Behavior: Behavior normal          Thought Content:  Thought content normal           Pertinent Laboratory/Diagnostic Studies:    Lab Results   Component Value Date    WBC 7 52 02/20/2021    HGB 12 8 02/20/2021    HCT 41 4 02/20/2021    MCV 95 02/20/2021     02/20/2021       No results found for: TSH    Lab Results   Component Value Date    CHOL 204 07/31/2015     Lab Results   Component Value Date    TRIG 105 01/26/2021     Lab Results   Component Value Date    HDL 57 01/26/2021     Lab Results   Component Value Date    LDLCALC 128 (H) 01/26/2021     Lab Results   Component Value Date    HGBA1C 5 5 01/26/2021     Lab Results   Component Value Date    SODIUM 141 02/20/2021    K 3 5 02/20/2021     02/20/2021    CO2 30 02/20/2021    ANIONGAP 6 04/24/2014    AGAP 7 02/20/2021    BUN 15 02/20/2021    CREATININE 0 77 02/20/2021    GLUC 128 02/20/2021    GLUF 93 01/26/2021    CALCIUM 8 6 02/20/2021    AST 20 02/20/2021    ALT 37 07/01/2021    ALKPHOS 70 02/20/2021    PROT 7 3 04/24/2014    TP 7 4 02/20/2021    BILITOT 0 38 04/24/2014    TBILI 0 10 (L) 02/20/2021    EGFR 87 02/20/2021       Orders Placed This Encounter   Procedures    CBC and differential    Comprehensive metabolic panel    Lipid Panel with Direct LDL reflex    TSH, 3rd generation    Vitamin D 25 hydroxy    Hemoglobin A1C    Ambulatory Referral to Orthopedic Surgery       ALLERGIES:  No Known Allergies    Current Medications     Current Outpatient Medications   Medication Sig Dispense Refill    Calcium Carb-Cholecalciferol (CALCIUM 1000 + D) 1000-800 MG-UNIT TABS Take by mouth daily        Iron-Vitamin C (IRON 100/C) 100-250 MG TABS Take by mouth once a week        LORazepam (ATIVAN) 0 5 mg tablet Take 1 tablet (0 5 mg total) by mouth as needed for anxiety (Launch) Take as need for dental work 30 tablet 0    Multiple Vitamin (MULTI-VITAMIN DAILY) TABS Take 1 tablet by mouth daily      pramipexole (MIRAPEX) 0 25 mg tablet Take 1 tablet (0 25 mg total) by mouth daily at bedtime as needed (restless leg) 90 tablet 3    Sodium Fluoride 5000 PPM 1 1 % PSTE       tretinoin (RETIN-A) 0 025 % cream Apply thin film topically once daily 45 g 3    amoxicillin (AMOXIL) 875 mg tablet Take 1 tablet (875 mg total) by mouth 2 (two) times a day for 10 days 20 tablet 0    tiZANidine (ZANAFLEX) 2 mg tablet Take 1-2 tabs qhs 60 tablet 2     No current facility-administered medications for this visit  There are no discontinued medications      Health Maintenance     Health Maintenance   Topic Date Due    Cervical Cancer Screening  01/16/2021    Influenza Vaccine (1) 09/01/2021    BMI: Followup Plan  02/11/2022    Breast Cancer Screening: Mammogram  03/09/2022    Depression Screening  04/05/2023    BMI: Adult  04/05/2023    Annual Physical  04/05/2023    Colorectal Cancer Screening  02/09/2027    DTaP,Tdap,and Td Vaccines (3 - Td or Tdap) 07/01/2031    HIV Screening  Completed    Hepatitis C Screening  Completed    COVID-19 Vaccine  Completed    Pneumococcal Vaccine: Pediatrics (0 to 5 Years) and At-Risk Patients (6 to 59 Years)  Aged Out    HIB Vaccine  Aged Out    Hepatitis B Vaccine  Aged Out    IPV Vaccine  Aged Out    Hepatitis A Vaccine  Aged Out    Meningococcal ACWY Vaccine  Aged Out    HPV Vaccine  Aged [employer] History   Administered Date(s) Administered    COVID-19 MODERNA VACC 0 25 ML IM BOOSTER 12/03/2021    COVID-19 MODERNA VACC 0 5 ML IM 12/30/2020, 01/26/2021    INFLUENZA 10/11/2020    Tdap 06/16/2017, 07/01/2021       Auorra Ann MD

## 2022-04-11 ENCOUNTER — TELEPHONE (OUTPATIENT)
Dept: PLASTIC SURGERY | Facility: CLINIC | Age: 57
End: 2022-04-11

## 2022-04-11 NOTE — TELEPHONE ENCOUNTER
Had called patient on 4/5 and emailed same date  Called again today concerning r/s of appt with Dr Mackenzie Jewell  We were just informed that Dr Mackenzie Jewell does NOT do botox for TMJ and need to r/s appt altogether    If patient calls back, Dr David Alegre does botox for TMJ

## 2022-04-12 ENCOUNTER — TELEPHONE (OUTPATIENT)
Dept: FAMILY MEDICINE CLINIC | Facility: CLINIC | Age: 57
End: 2022-04-12

## 2022-04-12 DIAGNOSIS — R53.83 FATIGUE, UNSPECIFIED TYPE: Primary | ICD-10-CM

## 2022-04-12 NOTE — TELEPHONE ENCOUNTER
Patient called, wanted to know if she should wait to get her blood work done until after she is finished taking amoxicillin, patient also wanted to know if she should be tested for lyme's disease she stated that the last time her thigh's were burning she had lyme's disease

## 2022-04-13 NOTE — TELEPHONE ENCOUNTER
Patient can definitely weight and proceed with blood work after she completes antibiotic  I will add Lyme testing to her panel    Thank you

## 2022-04-14 NOTE — TELEPHONE ENCOUNTER
2nd Call  Called pt- N/A- Unable to leave a message, VM is full  MD's orders sent to pt via My Chart

## 2022-04-22 ENCOUNTER — ANNUAL EXAM (OUTPATIENT)
Dept: OBGYN CLINIC | Facility: CLINIC | Age: 57
End: 2022-04-22
Payer: COMMERCIAL

## 2022-04-22 VITALS
BODY MASS INDEX: 27.66 KG/M2 | WEIGHT: 162 LBS | HEIGHT: 64 IN | SYSTOLIC BLOOD PRESSURE: 102 MMHG | DIASTOLIC BLOOD PRESSURE: 62 MMHG

## 2022-04-22 DIAGNOSIS — Z01.419 ENCOUNTER FOR ANNUAL ROUTINE GYNECOLOGICAL EXAMINATION: Primary | ICD-10-CM

## 2022-04-22 DIAGNOSIS — Z12.31 ENCOUNTER FOR SCREENING MAMMOGRAM FOR MALIGNANT NEOPLASM OF BREAST: ICD-10-CM

## 2022-04-22 PROCEDURE — S0610 ANNUAL GYNECOLOGICAL EXAMINA: HCPCS | Performed by: OBSTETRICS & GYNECOLOGY

## 2022-04-22 NOTE — PROGRESS NOTES
Assessment/Plan:     Diagnoses and all orders for this visit:    Encounter for annual routine gynecological examination    Encounter for screening mammogram for malignant neoplasm of breast  -     Mammo screening bilateral w 3d & cad; Future         Subjective      Riki Roblero is a 61 y o  female who presents for annual exam  The patient has no complaints today  The patient is sexually active  The patient is not taking hormone replacement therapy  Patient denies post-menopausal vaginal bleeding  The patient reports improvement in prolapse and urinary symptoms s/p pelvic floor PT  No menopausal symptoms  Menstrual History:  OB History        1    Para   1    Term   1            AB        Living   1       SAB        IAB        Ectopic        Multiple        Live Births                   Patient's last menstrual period was 2016  Past Medical History:   Diagnosis Date    Lyme disease     Small bowel obstruction ([facility] [address] )        Family History   Problem Relation Age of Onset    Atrial fibrillation Mother     Hyperlipidemia Mother     Heart disease Father     Hypertension Father     Hyperlipidemia Father     Diabetes Father     Prostate cancer Maternal Grandfather 48    Prostate cancer Maternal Uncle 61    Breast cancer Paternal Aunt 31       The following portions of the patient's history were reviewed and updated as appropriate: allergies, current medications, past family history, past medical history, past social history, past surgical history and problem list     Review of Systems  Pertinent items are noted in HPI       Objective      /62 (BP Location: Left arm, Patient Position: Sitting, Cuff Size: Large)   Ht 5' 4" (1 626 m)   Wt 73 5 kg (162 lb)   LMP 2016   BMI 27 81 kg/m²     General:   alert and oriented, in no acute distress   Heart:    Breasts: regular rate and rhythm, S1, S2 normal, no murmur, click, rub or gallop   appear normal, no suspicious masses, no skin or nipple changes or axillary nodes     Lungs: clear to auscultation bilaterally   Abdomen: soft, non-tender, without masses or organomegaly   Vulva: normal   Vagina: normal mucosa; + cystocele and rectocele   Cervix: no lesions   Uterus: normal size, mobile, non-tender   Adnexa: normal adnexa and no mass, fullness, tenderness

## 2022-04-25 ENCOUNTER — HOSPITAL ENCOUNTER (OUTPATIENT)
Dept: RADIOLOGY | Facility: HOSPITAL | Age: 57
Discharge: HOME | End: 2022-04-25
Payer: COMMERCIAL

## 2022-04-25 VITALS — HEIGHT: 66 IN | BODY MASS INDEX: 26.03 KG/M2 | WEIGHT: 162 LBS

## 2022-04-25 DIAGNOSIS — Z12.31 ENCOUNTER FOR SCREENING MAMMOGRAM FOR MALIGNANT NEOPLASM OF BREAST: ICD-10-CM

## 2022-04-25 PROCEDURE — 77063 BREAST TOMOSYNTHESIS BI: CPT

## 2022-04-25 PROCEDURE — 77067 SCR MAMMO BI INCL CAD: CPT

## 2022-04-26 ENCOUNTER — APPOINTMENT (OUTPATIENT)
Dept: LAB | Facility: HOSPITAL | Age: 57
End: 2022-04-26
Payer: COMMERCIAL

## 2022-04-26 DIAGNOSIS — Z13.1 ENCOUNTER FOR SCREENING FOR DIABETES MELLITUS: ICD-10-CM

## 2022-04-26 DIAGNOSIS — E55.9 VITAMIN D DEFICIENCY: ICD-10-CM

## 2022-04-26 DIAGNOSIS — R53.83 FATIGUE, UNSPECIFIED TYPE: ICD-10-CM

## 2022-04-26 DIAGNOSIS — Z13.220 ENCOUNTER FOR SCREENING FOR LIPID DISORDER: ICD-10-CM

## 2022-04-26 LAB
25(OH)D3 SERPL-MCNC: 36.5 NG/ML (ref 30–100)
ALBUMIN SERPL BCP-MCNC: 3.8 G/DL (ref 3.5–5)
ALP SERPL-CCNC: 62 U/L (ref 46–116)
ALT SERPL W P-5'-P-CCNC: 35 U/L (ref 12–78)
ANION GAP SERPL CALCULATED.3IONS-SCNC: 1 MMOL/L (ref 4–13)
AST SERPL W P-5'-P-CCNC: 15 U/L (ref 5–45)
BASOPHILS # BLD AUTO: 0.05 THOUSANDS/ΜL (ref 0–0.1)
BASOPHILS NFR BLD AUTO: 1 % (ref 0–1)
BILIRUB SERPL-MCNC: 0.56 MG/DL (ref 0.2–1)
BUN SERPL-MCNC: 14 MG/DL (ref 5–25)
CALCIUM SERPL-MCNC: 9.4 MG/DL (ref 8.3–10.1)
CHLORIDE SERPL-SCNC: 104 MMOL/L (ref 100–108)
CHOLEST SERPL-MCNC: 220 MG/DL
CO2 SERPL-SCNC: 31 MMOL/L (ref 21–32)
CREAT SERPL-MCNC: 0.79 MG/DL (ref 0.6–1.3)
EOSINOPHIL # BLD AUTO: 0.11 THOUSAND/ΜL (ref 0–0.61)
EOSINOPHIL NFR BLD AUTO: 3 % (ref 0–6)
ERYTHROCYTE [DISTWIDTH] IN BLOOD BY AUTOMATED COUNT: 13.2 % (ref 11.6–15.1)
EST. AVERAGE GLUCOSE BLD GHB EST-MCNC: 114 MG/DL
GFR SERPL CREATININE-BSD FRML MDRD: 83 ML/MIN/1.73SQ M
GLUCOSE P FAST SERPL-MCNC: 111 MG/DL (ref 65–99)
HBA1C MFR BLD: 5.6 %
HCT VFR BLD AUTO: 43.3 % (ref 34.8–46.1)
HDLC SERPL-MCNC: 56 MG/DL
HGB BLD-MCNC: 14.2 G/DL (ref 11.5–15.4)
IMM GRANULOCYTES # BLD AUTO: 0.02 THOUSAND/UL (ref 0–0.2)
IMM GRANULOCYTES NFR BLD AUTO: 1 % (ref 0–2)
LDLC SERPL CALC-MCNC: 146 MG/DL (ref 0–100)
LYMPHOCYTES # BLD AUTO: 1.61 THOUSANDS/ΜL (ref 0.6–4.47)
LYMPHOCYTES NFR BLD AUTO: 40 % (ref 14–44)
MCH RBC QN AUTO: 30.1 PG (ref 26.8–34.3)
MCHC RBC AUTO-ENTMCNC: 32.8 G/DL (ref 31.4–37.4)
MCV RBC AUTO: 92 FL (ref 82–98)
MONOCYTES # BLD AUTO: 0.37 THOUSAND/ΜL (ref 0.17–1.22)
MONOCYTES NFR BLD AUTO: 9 % (ref 4–12)
NEUTROPHILS # BLD AUTO: 1.86 THOUSANDS/ΜL (ref 1.85–7.62)
NEUTS SEG NFR BLD AUTO: 46 % (ref 43–75)
NRBC BLD AUTO-RTO: 0 /100 WBCS
PLATELET # BLD AUTO: 305 THOUSANDS/UL (ref 149–390)
PMV BLD AUTO: 9.4 FL (ref 8.9–12.7)
POTASSIUM SERPL-SCNC: 3.9 MMOL/L (ref 3.5–5.3)
PROT SERPL-MCNC: 7.7 G/DL (ref 6.4–8.2)
RBC # BLD AUTO: 4.72 MILLION/UL (ref 3.81–5.12)
SODIUM SERPL-SCNC: 136 MMOL/L (ref 136–145)
TRIGL SERPL-MCNC: 92 MG/DL
TSH SERPL DL<=0.05 MIU/L-ACNC: 2.97 UIU/ML (ref 0.45–4.5)
WBC # BLD AUTO: 4.02 THOUSAND/UL (ref 4.31–10.16)

## 2022-04-26 PROCEDURE — 86618 LYME DISEASE ANTIBODY: CPT

## 2022-04-26 PROCEDURE — 80061 LIPID PANEL: CPT

## 2022-04-26 PROCEDURE — 84443 ASSAY THYROID STIM HORMONE: CPT

## 2022-04-26 PROCEDURE — 85025 COMPLETE CBC W/AUTO DIFF WBC: CPT

## 2022-04-26 PROCEDURE — 82306 VITAMIN D 25 HYDROXY: CPT

## 2022-04-26 PROCEDURE — 36415 COLL VENOUS BLD VENIPUNCTURE: CPT

## 2022-04-26 PROCEDURE — 80053 COMPREHEN METABOLIC PANEL: CPT

## 2022-04-26 PROCEDURE — 83036 HEMOGLOBIN GLYCOSYLATED A1C: CPT

## 2022-04-27 ENCOUNTER — TELEPHONE (OUTPATIENT)
Dept: OBGYN CLINIC | Facility: CLINIC | Age: 57
End: 2022-04-27

## 2022-04-27 ENCOUNTER — APPOINTMENT (OUTPATIENT)
Dept: LAB | Facility: HOSPITAL | Age: 57
End: 2022-04-27
Attending: OBSTETRICS & GYNECOLOGY
Payer: COMMERCIAL

## 2022-04-27 DIAGNOSIS — N30.00 ACUTE CYSTITIS WITHOUT HEMATURIA: ICD-10-CM

## 2022-04-27 DIAGNOSIS — N30.00 ACUTE CYSTITIS WITHOUT HEMATURIA: Primary | ICD-10-CM

## 2022-04-27 LAB
B BURGDOR IGG+IGM SER-ACNC: 26
BACTERIA UR QL AUTO: NORMAL /HPF
BILIRUB UR QL STRIP: NEGATIVE
CLARITY UR: CLEAR
COLOR UR: COLORLESS
GLUCOSE UR STRIP-MCNC: NEGATIVE MG/DL
HGB UR QL STRIP.AUTO: NEGATIVE
KETONES UR STRIP-MCNC: NEGATIVE MG/DL
LEUKOCYTE ESTERASE UR QL STRIP: NEGATIVE
NITRITE UR QL STRIP: NEGATIVE
NON-SQ EPI CELLS URNS QL MICRO: NORMAL /HPF
PH UR STRIP.AUTO: 6.5 [PH]
PROT UR STRIP-MCNC: NEGATIVE MG/DL
RBC #/AREA URNS AUTO: NORMAL /HPF
SP GR UR STRIP.AUTO: 1 (ref 1–1.03)
UROBILINOGEN UR STRIP-ACNC: <2 MG/DL
WBC #/AREA URNS AUTO: NORMAL /HPF

## 2022-04-27 PROCEDURE — 81001 URINALYSIS AUTO W/SCOPE: CPT

## 2022-04-27 PROCEDURE — 87086 URINE CULTURE/COLONY COUNT: CPT

## 2022-04-27 RX ORDER — NITROFURANTOIN 25; 75 MG/1; MG/1
100 CAPSULE ORAL 2 TIMES DAILY
Qty: 14 CAPSULE | Refills: 0 | Status: SHIPPED | OUTPATIENT
Start: 2022-04-27 | End: 2022-05-04

## 2022-04-27 NOTE — TELEPHONE ENCOUNTER
Pt saw you on Mon and did not mention this - she thought it was all muscular pain - but she has bilateral flank pain , frequency, NO burning, and cloudy urine  She said these are all sx of when she gets uti  Has had sx for 1 week  Sent for u/a and culture  She has no allergies and uses [facility]  Any RX? ?  Thanks

## 2022-04-28 LAB — BACTERIA UR CULT: NORMAL

## 2022-06-29 ENCOUNTER — OFFICE VISIT (OUTPATIENT)
Dept: URGENT CARE | Facility: CLINIC | Age: 57
End: 2022-06-29
Payer: COMMERCIAL

## 2022-06-29 VITALS
DIASTOLIC BLOOD PRESSURE: 70 MMHG | WEIGHT: 162.6 LBS | BODY MASS INDEX: 26.13 KG/M2 | TEMPERATURE: 100.6 F | OXYGEN SATURATION: 98 % | HEIGHT: 66 IN | SYSTOLIC BLOOD PRESSURE: 110 MMHG | HEART RATE: 98 BPM | RESPIRATION RATE: 18 BRPM

## 2022-06-29 DIAGNOSIS — R05.1 ACUTE COUGH: ICD-10-CM

## 2022-06-29 DIAGNOSIS — L03.011 PARONYCHIA OF FINGER OF RIGHT HAND: Primary | ICD-10-CM

## 2022-06-29 PROCEDURE — 87636 SARSCOV2 & INF A&B AMP PRB: CPT | Performed by: PHYSICIAN ASSISTANT

## 2022-06-29 PROCEDURE — 99203 OFFICE O/P NEW LOW 30 MIN: CPT | Performed by: PHYSICIAN ASSISTANT

## 2022-06-29 RX ORDER — CEPHALEXIN 500 MG/1
500 CAPSULE ORAL EVERY 8 HOURS SCHEDULED
Qty: 21 CAPSULE | Refills: 0 | Status: SHIPPED | OUTPATIENT
Start: 2022-06-29 | End: 2022-07-06

## 2022-06-29 NOTE — LETTER
[facility] [id number]  Dept: [phone]    June 29, 2022    Patient: Cecil Carbone  YOB: 1965    Cecil Carbone was seen and evaluated at our [facility]  Please note if Covid and Flu tests are negative, they may return to work when fever free for 24 hours without the use of a fever reducing agent  If Covid or Flu test is positive, they may return to work 5 days from the onset of symptoms  Upon return, they must then adhere to strict masking for an additional 5 days      Sincerely,    Елена Schmitt PA-C

## 2022-06-29 NOTE — PROGRESS NOTES
[facility]        NAME: Tereza Rodriguez is a 61 y o  female  : 1965    MRN: [id number]  DATE: 2022  TIME: 3:00 PM    Assessment and Plan   Paronychia of finger of right hand [L03 011]  1  Paronychia of finger of right hand  cephalexin (KEFLEX) 500 mg capsule   2  Acute cough  Covid/Flu-Office Collect         Patient Instructions       Continue to monitor symptoms  If new or worsening symptoms develop, go immediately to Er  Drink plenty of fluids  Follow up with Family Doctor this week  Chief Complaint     Chief Complaint   Patient presents with    Cold Like Symptoms     Pt states ill x3 days  sore throat and throat tightness,  body aches, dry cough, headache, chills, leg pain,  fever 101 5  while taking Advil and Tylenol  Pt had a Covid test  was neg  yesterday  Pt is Vaxed and Boosted, Flu shot current   Wound Infection     Pt also has infection  around   the right index fingernail on going x3 weeks  area becomes red and swollen and tender to the touch  Pt has been using Neosporin  History of Present Illness       Cough  This is a new problem  Episode onset: 3 days ago  The problem has been gradually worsening  The problem occurs every few minutes  The cough is non-productive  Associated symptoms include chills, a fever, postnasal drip, a rash and a sore throat  Pertinent negatives include no chest pain, ear pain, headaches, myalgias, rhinorrhea, shortness of breath or wheezing  Nothing aggravates the symptoms  Treatments tried: Tylenol  The treatment provided no relief  There is no history of asthma  Positive COVID contact at work less than a week ago      Redness and swelling to R 2nd digit around fingernail for one week s/p manicure 2 weeks ago      Review of Systems   Review of Systems   Constitutional: Positive for chills, fatigue and fever  Negative for diaphoresis  HENT: Positive for postnasal drip, sinus pressure, sinus pain, sneezing and sore throat  Negative for congestion, ear pain, rhinorrhea and voice change  Eyes: Negative  Respiratory: Positive for cough  Negative for chest tightness, shortness of breath and wheezing  Cardiovascular: Negative for chest pain and palpitations  Gastrointestinal: Negative for abdominal pain, constipation, diarrhea, nausea and vomiting  Endocrine: Negative  Genitourinary: Negative for dysuria  Musculoskeletal: Negative for back pain, myalgias and neck pain  Skin: Positive for rash  Negative for pallor  Allergic/Immunologic: Negative  Neurological: Negative for dizziness, syncope and headaches  Hematological: Negative  Psychiatric/Behavioral: Negative            Current Medications       Current Outpatient Medications:     Calcium Carb-Cholecalciferol 1000-800 MG-UNIT TABS, Take by mouth daily  , Disp: , Rfl:     cephalexin (KEFLEX) 500 mg capsule, Take 1 capsule (500 mg total) by mouth every 8 (eight) hours for 7 days, Disp: 21 capsule, Rfl: 0    Iron-Vitamin C 100-250 MG TABS, Take by mouth once a week  , Disp: , Rfl:     LORazepam (ATIVAN) 0 5 mg tablet, Take 1 tablet (0 5 mg total) by mouth as needed for anxiety (Launch) Take as need for dental work, Disp: 30 tablet, Rfl: 0    Multiple Vitamin (MULTI-VITAMIN DAILY) TABS, Take 1 tablet by mouth daily, Disp: , Rfl:     Sodium Fluoride 5000 PPM 1 1 % PSTE, , Disp: , Rfl:     tiZANidine (ZANAFLEX) 2 mg tablet, Take 1-2 tabs qhs, Disp: 60 tablet, Rfl: 2    tretinoin (RETIN-A) 0 025 % cream, Apply thin film topically once daily, Disp: 45 g, Rfl: 3    pramipexole (MIRAPEX) 0 25 mg tablet, Take 1 tablet (0 25 mg total) by mouth daily at bedtime as needed (restless leg) (Patient not taking: No sig reported), Disp: 90 tablet, Rfl: 3    Current Allergies     Allergies as of 06/29/2022    (No Known Allergies)            The following portions of the patient's history were reviewed and updated as appropriate: allergies, current medications, past family history, past medical history, past social history, past surgical history and problem list      Past Medical History:   Diagnosis Date    Lyme disease     Small bowel obstruction ([address] )        Past Surgical History:   Procedure Laterality Date    COLONOSCOPY  2013    DILATION AND CURETTAGE OF UTERUS      OVARIAN CYST REMOVAL  1995    ID LAP,DIAGNOSTIC ABDOMEN N/A 2/21/2021    Procedure: LAPAROSCOPY DIAGNOSTIC, exploratory laparotomy, lysis of adhesions, REDUCTION OF INTERNAL HERNIA;  Surgeon: Govind Lamb MD;  Location: [facility];  Service: General       Family History   Problem Relation Age of Onset    Atrial fibrillation Mother     Hyperlipidemia Mother     Heart disease Father     Hypertension Father     Hyperlipidemia Father     Diabetes Father     Prostate cancer Maternal Grandfather 48    Prostate cancer Maternal Uncle 61    Breast cancer Paternal Aunt 28         Medications have been verified  Objective   /70   Pulse 98   Temp (!) 100 6 °F (38 1 °C) (Tympanic)   Resp 18   Ht 5' 6" (1 676 m)   Wt 73 8 kg (162 lb 9 6 oz)   LMP 06/22/2016   SpO2 98%   BMI 26 24 kg/m²        Physical Exam     Physical Exam  Vitals and nursing note reviewed  Constitutional:       General: She is not in acute distress  Appearance: Normal appearance  She is well-developed  She is not ill-appearing or diaphoretic  HENT:      Head: Normocephalic and atraumatic  Right Ear: External ear normal       Left Ear: External ear normal       Nose: Congestion present  No rhinorrhea  Mouth/Throat:      Pharynx: Posterior oropharyngeal erythema present  No oropharyngeal exudate  Eyes:      General:         Right eye: No discharge  Left eye: No discharge  Conjunctiva/sclera: Conjunctivae normal    Cardiovascular:      Rate and Rhythm: Normal rate and regular rhythm  Heart sounds: Normal heart sounds     Pulmonary:      Effort: Pulmonary effort is normal  No respiratory distress  Breath sounds: Normal breath sounds  No wheezing, rhonchi or rales  Musculoskeletal:      Cervical back: Normal range of motion and neck supple  Comments: Redness around cuticle ulnar aspect of R 2nd digit  No drainage  Mild swelling   Lymphadenopathy:      Cervical: No cervical adenopathy  Skin:     General: Skin is warm  Capillary Refill: Capillary refill takes less than 2 seconds  Findings: No rash  Neurological:      Mental Status: She is alert

## 2022-06-29 NOTE — PATIENT INSTRUCTIONS
Patient Instructions   COVID testing initiated  Results may take up to 2-3 days to return, but often come back sooner (1-2 days)     If the patient has a [facility] My Chart account, results may be accessed on line  If the patient does not have the [facility] account, please establish one so results can be accessed  This will be the easiest and quickest way to get a copy of your test results if you require printed documentation  If patient is symptomatic and until results are obtained, home quarantine / self isolation strongly encouraged  If testing is done for screening purposes and patient is not symptomatic, we still recommend masking, social distancing, good hygiene practices be followed  If COVID test is positive, patient / care giver will be contacted by ordering provider or designated staff  If COVID test is positive, please call the primary care provider office to inform of positive test and request follow up evaluation appointment  (Generally, primary care providers are doing telemedicine visits with their positive COVID patients )  If COVID test is positive, please again review all information below  Further questions may be addressed by the primary care provider or the [address] [place], [place] [id number] Mueller at [phone]  If the patient / caregiver has not heard about test results or has been unable to access results on the patient My Chart account in a timely fashion, please call the provider's office where test was ordered (or Hot Line if applicable)  to inquire about results  If results are negative and patient / care giver has been found to have already accessed results through the [facility]  [facility] Chart rosina, no call will be made  Until results are obtained, home quarantine / self isolation strongly encouraged       If the patient would develop profound weakness, chest pain, shortness of breath please proceed to an emergency room for further evaluation otherwise we do recommend that patient follow-up with their primary care provider in the next 5-7 days if not improving  Symptomatic treatment as needed for symptoms relief based on age / medical status of patient  Things like warm salt water gargles, Tylenol or Ibuprofen (if not contraindicated), drinking plenty of fluids, nasal saline rinses / spray, warm tea with honey (not for patients less than 1 year of age),  etc may provide symptoms relief  [address]     Your healthcare provider and/or public health staff have evaluated you and have determined that you do not need to remain in the hospital at this time  At this time you can be isolated at home where you will be monitored by staff from your local or state health department  You should carefully follow the prevention and isolation steps below until a healthcare provider or local or state health department says that you can return to your normal activities  Stay home except to get medical care     People who are mildly ill with COVID-19 are able to isolate at home during their illness  You should restrict activities outside your home, except for getting medical care  Do not go to work, school, or public areas  Avoid using public transportation, ride-sharing, or taxis  Separate yourself from other people and animals in your home     People: As much as possible, you should stay in a specific room and away from other people in your home  Also, you should use a separate bathroom, if available  Animals: You should restrict contact with pets and other animals while you are sick with COVID-19, just like you would around other people  Although there have not been reports of pets or other animals becoming sick with COVID-19, it is still recommended that people sick with COVID-19 limit contact with animals until more information is known about the virus   When possible, have another member of your household care for your animals while you are sick  If you are sick with COVID-19, avoid contact with your pet, including petting, snuggling, being kissed or licked, and sharing food  If you must care for your pet or be around animals while you are sick, wash your hands before and after you interact with pets and wear a facemask  See COVID-19 and Animals for more information  Call ahead before visiting your doctor     If you have a medical appointment, call the healthcare provider and tell them that you have or may have COVID-19  This will help the healthcare providers office take steps to keep other people from getting infected or exposed  Wear a facemask     You should wear a facemask when you are around other people (e g , sharing a room or vehicle) or pets and before you enter a healthcare providers office  If you are not able to wear a facemask (for example, because it causes trouble breathing), then people who live with you should not stay in the same room with you, or they should wear a facemask if they enter your room  Cover your coughs and sneezes     Cover your mouth and nose with a tissue when you cough or sneeze  Throw used tissues in a lined trash can  Immediately wash your hands with soap and water for at least 20 seconds or, if soap and water are not available, clean your hands with an alcohol-based hand  that contains at least 60% alcohol  Clean your hands often     Wash your hands often with soap and water for at least 20 seconds, especially after blowing your nose, coughing, or sneezing; going to the bathroom; and before eating or preparing food  If soap and water are not readily available, use an alcohol-based hand  with at least 60% alcohol, covering all surfaces of your hands and rubbing them together until they feel dry  Soap and water are the best option if hands are visibly dirty  Avoid touching your eyes, nose, and mouth with unwashed hands       Avoid sharing personal household items     You should not share dishes, drinking glasses, cups, eating utensils, towels, or bedding with other people or pets in your home  After using these items, they should be washed thoroughly with soap and water  Clean all high-touch surfaces everyday     High touch surfaces include counters, tabletops, doorknobs, bathroom fixtures, toilets, phones, keyboards, tablets, and bedside tables  Also, clean any surfaces that may have blood, stool, or body fluids on them  Use a household cleaning spray or wipe, according to the label instructions  Labels contain instructions for safe and effective use of the cleaning product including precautions you should take when applying the product, such as wearing gloves and making sure you have good ventilation during use of the product  Monitor your symptoms     Seek prompt medical attention if your illness is worsening (e g , difficulty breathing)  Before seeking care, call your healthcare provider and tell them that you have, or are being evaluated for, COVID-19  Put on a facemask before you enter the facility  These steps will help the healthcare providers office to keep other people in the office or waiting room from getting infected or exposed  Ask your healthcare provider to call the local or [facility] health department  Persons who are placed under active monitoring or facilitated self-monitoring should follow instructions provided by their local health department or occupational health professionals, as appropriate  If you have a medical emergency and need to call 911, notify the dispatch personnel that you have, or are being evaluated for COVID-19  If possible, put on a facemask before emergency medical services arrive  Discontinuing home isolation     Patients with confirmed COVID-19 should remain under home isolation precautions until the following conditions are met:    They have had no fever for at least 24 hours (that is one full day of no fever without the use medicine that reduces fevers)  AND  other symptoms have improved (for example, when their cough or shortness of breath have improved)  AND  at least 10 days have passed since their symptoms first appeared     Patients with confirmed COVID-19 should also notify close contacts (including their workplace) and ask that they self-quarantine  Currently, close contact is defined as being within 6 feet for for a cumulative total of 15 minutes or more over a 24 hour period starting from 2 days before illness onset  (or, for asymptomatic patients, 2 days prior to test specimen collection)  Close contacts of patients diagnosed with COVID-19 should be instructed by the patient to self-quarantine for 14 days from the last time of their last contact with the patient  Source: [employer]     Paronychia   WHAT YOU NEED TO KNOW:   Paronychia is an infection of your nail fold caused by bacteria or a fungus  The nail fold is the skin around your nail  Paronychia may happen suddenly and last for 6 weeks or longer  You may have paronychia on more than 1 finger or toe  DISCHARGE INSTRUCTIONS:   Medicines:   Td vaccine  is a booster shot used to help prevent tetanus and diphtheria  The Td booster may be given to adolescents and adults every 10 years or for certain wounds and injuries  Antibiotics: This medicine will help fight or prevent an infection  It may be given as a pill, cream, or ointment  Steroids: This medicine will help decrease inflammation  It may be given as a pill, cream, or ointment  Antifungal medicine: This medicine helps kill fungus that may be causing your infection  It may be given as a cream or ointment  NSAIDs:  These medicines decrease pain and swelling  NSAIDs are available without a doctor's order  Ask your healthcare provider which medicine is right for you  Ask how much to take and when to take it  Take as directed   NSAIDs can cause stomach bleeding and kidney problems if not taken correctly  Take your medicine as directed  Contact your healthcare provider if you think your medicine is not helping or if you have side effects  Tell him of her if you are allergic to any medicine  Keep a list of the medicines, vitamins, and herbs you take  Include the amounts, and when and why you take them  Bring the list or the pill bottles to follow-up visits  Carry your medicine list with you in case of an emergency  Follow up with your doctor as directed:  Write down your questions so you remember to ask them during your visits  Self-care:   Soak your nail:  Soak your nail in a mixture of equal parts vinegar and water 3 or 4 times each day  This will help decrease inflammation  Apply a warm compress:  Soak a washcloth in warm water and place it on your nail  This will help decrease inflammation  Elevate:  Raise your nail above the level of your heart as often as you can  This will help decrease swelling and pain  Prop your nail on pillows or blankets to keep it elevated comfortably  Use lotion:  Apply lotion after you wash your hands  This will prevent your skin from becoming too dry  Prevent paronychia:   Avoid chemicals and allergens that may harm your skin and nails  This includes soaps, laundry detergents, and nail products  Keep your nails clean and dry  Avoid soaking your nails in water  Use cotton-lined rubber gloves or wear 2 rubber gloves if you work with food or water  The gloves will help protect your nail folds  Keep your nails short  Do not bite your nails, pick at your hangnails, suck your fingers, or wear fake nails  Bring your own nail tools when you go to the nail salon  Contact your healthcare provider if:   Your nail becomes loose, deformed, or falls off  You have a large abscess on your nail  You have questions or concerns about your condition or care      Return to the emergency department if: You have severe nail pain  The inflammation spreads to your hand or arm  © Copyright [employer] 2022 Information is for End User's use only and may not be sold, redistributed or otherwise used for commercial purposes  All illustrations and images included in CareNotes® are the copyrighted property of A [employer] A M [employer] Inc  or Brendon Myers  The above information is an  only  It is not intended as medical advice for individual conditions or treatments  Talk to your doctor, nurse or pharmacist before following any medical regimen to see if it is safe and effective for you

## 2022-06-30 LAB
FLUAV RNA RESP QL NAA+PROBE: NEGATIVE
FLUBV RNA RESP QL NAA+PROBE: NEGATIVE
SARS-COV-2 RNA RESP QL NAA+PROBE: POSITIVE

## 2022-07-13 ENCOUNTER — TELEPHONE (OUTPATIENT)
Dept: PODIATRY | Facility: CLINIC | Age: 57
End: 2022-07-13

## 2022-07-13 NOTE — TELEPHONE ENCOUNTER
Patient wanted to ask if it is normal for the nail that was completely removed to not grow past the the skin on the toe  She stated she is satisfied with how it grew back, she wanted to know for aesthetic purposes

## 2022-07-13 NOTE — TELEPHONE ENCOUNTER
Loulou Anaya is a [employer] employee who has had orthotics in the past with Dr Nina Moore  She would like somebody to call her and tell her if [employer] [facility] covers orthotics or not

## 2022-07-13 NOTE — TELEPHONE ENCOUNTER
I spoke with pt, I let her know our insurance does not cover orthotics and offered self-pay option  She let me know that she had another set of orthotics that were done a while ago by Dr Jason Wilson and she just wanted to see if it was going to be covered  I let her know that another option would be to get her orthotics refurbished and that she'd only have to pay half of the amount instead of getting re-casted  She stated she will keep that in mind and let us know when she'd be able to do it because she wants to use her FSA card to pay

## 2022-07-18 DIAGNOSIS — M25.561 PAIN IN BOTH KNEES, UNSPECIFIED CHRONICITY: Primary | ICD-10-CM

## 2022-07-18 DIAGNOSIS — M25.562 PAIN IN BOTH KNEES, UNSPECIFIED CHRONICITY: Primary | ICD-10-CM

## 2022-07-19 ENCOUNTER — TELEPHONE (OUTPATIENT)
Dept: FAMILY MEDICINE CLINIC | Facility: CLINIC | Age: 57
End: 2022-07-19

## 2022-07-19 NOTE — TELEPHONE ENCOUNTER
Patient calling to advise that she has been experiencing an increase in symptoms with her Restless Leg Syndrome since having Covid 6/29/2022  Patient is looking for recommendation on if she can take Mirapex and muscle relaxer together  She is also experiencing increased muscle strain in her eyes since Covid  Inquiring  if you recommend following up with her eye doctor

## 2022-07-20 ENCOUNTER — HOSPITAL ENCOUNTER (OUTPATIENT)
Dept: RADIOLOGY | Facility: HOSPITAL | Age: 57
Discharge: HOME | End: 2022-07-20
Attending: ORTHOPAEDIC SURGERY
Payer: COMMERCIAL

## 2022-07-20 DIAGNOSIS — M25.562 PAIN IN BOTH KNEES, UNSPECIFIED CHRONICITY: ICD-10-CM

## 2022-07-20 DIAGNOSIS — M25.561 PAIN IN BOTH KNEES, UNSPECIFIED CHRONICITY: ICD-10-CM

## 2022-07-20 PROCEDURE — 73562 X-RAY EXAM OF KNEE 3: CPT

## 2022-07-20 NOTE — TELEPHONE ENCOUNTER
Spoke with patient  Made aware of provider's instructions  Patient verbalized understanding and was agreeable w/ plan

## 2022-07-20 NOTE — TELEPHONE ENCOUNTER
Patient can use Mirapex along with muscle relaxant for symptoms of restless leg  She should schedule follow-up with ophthalmologist    If any ongoing concerns-I am happy to see her in the office for follow-up      Thank you

## 2022-08-03 ENCOUNTER — OFFICE VISIT (OUTPATIENT)
Dept: OBGYN CLINIC | Facility: HOSPITAL | Age: 57
End: 2022-08-03
Payer: COMMERCIAL

## 2022-08-03 VITALS
SYSTOLIC BLOOD PRESSURE: 107 MMHG | HEART RATE: 88 BPM | HEIGHT: 66 IN | WEIGHT: 164 LBS | DIASTOLIC BLOOD PRESSURE: 77 MMHG | BODY MASS INDEX: 26.36 KG/M2

## 2022-08-03 DIAGNOSIS — M54.42 CHRONIC BILATERAL LOW BACK PAIN WITH LEFT-SIDED SCIATICA: ICD-10-CM

## 2022-08-03 DIAGNOSIS — G89.29 CHRONIC BILATERAL LOW BACK PAIN WITH LEFT-SIDED SCIATICA: ICD-10-CM

## 2022-08-03 DIAGNOSIS — M54.16 RADICULOPATHY, LUMBAR REGION: Primary | ICD-10-CM

## 2022-08-03 PROCEDURE — 99213 OFFICE O/P EST LOW 20 MIN: CPT | Performed by: ORTHOPAEDIC SURGERY

## 2022-08-03 RX ORDER — METHYLPREDNISOLONE 4 MG/1
TABLET ORAL
Qty: 21 TABLET | Refills: 0 | Status: SHIPPED | OUTPATIENT
Start: 2022-08-03 | End: 2022-09-01 | Stop reason: ALTCHOICE

## 2022-08-03 RX ORDER — AMOXICILLIN 875 MG/1
TABLET, COATED ORAL
COMMUNITY
Start: 2022-04-28 | End: 2022-09-01 | Stop reason: ALTCHOICE

## 2022-08-03 RX ORDER — CEPHALEXIN 500 MG/1
CAPSULE ORAL
COMMUNITY
Start: 2022-07-07 | End: 2022-09-01 | Stop reason: ALTCHOICE

## 2022-08-03 NOTE — PROGRESS NOTES
Assessment:  1  Radiculopathy, lumbar region  Ambulatory referral to Pain Management    Ambulatory referral to Physical Therapy   2  Chronic bilateral low back pain with left-sided sciatica  Ambulatory referral to Pain Management    Ambulatory referral to Physical Therapy       Plan:  Low back pain with left > right lumbar radiculopathy  The patient is referred to pain management for further evaluation and treatment  She is prescribed medrol dose pack  She should initiate physical therapy  The patient can follow up as needed and is welcome to return at any point with any new or old issue  To do next visit:  Return if symptoms worsen or fail to improve  The above stated was discussed in layman's terms and the patient expressed understanding  All questions were answered to the patient's satisfaction  Scribe Attestation    I,:  Santa Blankenship am acting as a scribe while in the presence of the attending physician :       I,:  Silvano Randall MD personally performed the services described in this documentation    as scribed in my presence :             Subjective:   Gaye Sharma is a 61 y o  female who presents for initial evaluation of bilateral knees  She had last been seen in 2016 including right knee MRI displaying lateral meniscus tear  Today she complains of low back pain with left > right lateral thigh, anterior shin pain with focal left lateral knee pain  She rates her 4/10 and 9/10 at its worse  Pushing wheel chairs/stretchers aggravates while rest alleviates  She does use occasional Advil 400-600mg 1-3x/day  She has seen a chiropractor with some benefit  She does stretches with benefit  She denies past injections or surgery of lumbar, hip or knees          Review of systems negative unless otherwise specified in HPI    Past Medical History:   Diagnosis Date    Lyme disease     Small bowel obstruction ([address] )        Past Surgical History:   Procedure Laterality Date    COLONOSCOPY 2013    DILATION AND CURETTAGE OF UTERUS      OVARIAN CYST REMOVAL  1995    VT LAP,DIAGNOSTIC ABDOMEN N/A 2/21/2021    Procedure: LAPAROSCOPY DIAGNOSTIC, exploratory laparotomy, lysis of adhesions, REDUCTION OF INTERNAL HERNIA;  Surgeon: Gabo Guthrie MD;  Location: [facility];  Service: General       Family History   Problem Relation Age of Onset    Atrial fibrillation Mother     Hyperlipidemia Mother     Heart disease Father     Hypertension Father     Hyperlipidemia Father     Diabetes Father     Prostate cancer Maternal Grandfather 48    Prostate cancer Maternal Uncle 61    Breast cancer Paternal Aunt 28       Social History     Occupational History    Not on file   Tobacco Use    Smoking status: Never Smoker    Smokeless tobacco: Never Used   Vaping Use    Vaping Use: Never used   Substance and Sexual Activity    Alcohol use: Yes     Comment: occasional    Drug use: No    Sexual activity: Yes     Partners: Male     Birth control/protection: Post-menopausal         Current Outpatient Medications:     amoxicillin (AMOXIL) 875 mg tablet, , Disp: , Rfl:     Calcium Carb-Cholecalciferol 1000-800 MG-UNIT TABS, Take by mouth daily  , Disp: , Rfl:     cephalexin (KEFLEX) 500 mg capsule, , Disp: , Rfl:     Iron-Vitamin C 100-250 MG TABS, Take by mouth once a week  , Disp: , Rfl:     LORazepam (ATIVAN) 0 5 mg tablet, Take 1 tablet (0 5 mg total) by mouth as needed for anxiety (Launch) Take as need for dental work, Disp: 30 tablet, Rfl: 0    Multiple Vitamin (MULTI-VITAMIN DAILY) TABS, Take 1 tablet by mouth daily, Disp: , Rfl:     pramipexole (MIRAPEX) 0 25 mg tablet, Take 1 tablet (0 25 mg total) by mouth daily at bedtime as needed (restless leg) (Patient not taking: No sig reported), Disp: 90 tablet, Rfl: 3    Sodium Fluoride 5000 PPM 1 1 % PSTE, , Disp: , Rfl:     tiZANidine (ZANAFLEX) 2 mg tablet, Take 1-2 tabs qhs, Disp: 60 tablet, Rfl: 2    tretinoin (RETIN-A) 0 025 % cream, Apply thin film topically once daily, Disp: 45 g, Rfl: 3    No Known Allergies         Vitals:    08/03/22 1409   BP: 107/77   Pulse: 88       Objective:  Physical exam  · General: Awake, Alert, Oriented  · Eyes: Pupils equal, round and reactive to light  · Heart: regular rate and rhythm  · Lungs: No audible wheezing  · Abdomen: soft                    Ortho Exam  Lumbar:  Good flexibility with flexion with no pain  No pain with extension  No pain with trunk rotation  5/5 strength L3-S1 with exception of 4/5 left L3, L4 and L5  2+ reflexes L4 and S1 bilaterally   No pain with bilateral straight leg raises    Bilateral knees:  Non-tender over pes anserine  No erythema or ecchymosis  No effusion or swelling  Normal strength  Good ROM with crepitus   Calf compartments soft and supple  Sensation intact  Toes are warm sensate and mobile    Left hip:  TTP over greater trochanter    Right hip:  TTP over greater trochanter      Diagnostics, reviewed and taken today if performed as documented: The attending physician has personally reviewed the pertinent films in PACS and interpretation is as follows:  Bilateral knee x-rays:  Mild-moderate patellofemoral arthritic changes  Procedures, if performed today:    Procedures    None performed      Portions of the record may have been created with voice recognition software  Occasional wrong word or "sound a like" substitutions may have occurred due to the inherent limitations of voice recognition software  Read the chart carefully and recognize, using context, where substitutions have occurred

## 2022-09-01 ENCOUNTER — OFFICE VISIT (OUTPATIENT)
Dept: FAMILY MEDICINE CLINIC | Facility: CLINIC | Age: 57
End: 2022-09-01
Payer: COMMERCIAL

## 2022-09-01 VITALS
HEART RATE: 97 BPM | OXYGEN SATURATION: 98 % | HEIGHT: 66 IN | DIASTOLIC BLOOD PRESSURE: 60 MMHG | BODY MASS INDEX: 26.52 KG/M2 | SYSTOLIC BLOOD PRESSURE: 110 MMHG | WEIGHT: 165 LBS | RESPIRATION RATE: 16 BRPM | TEMPERATURE: 98.4 F

## 2022-09-01 DIAGNOSIS — E78.49 OTHER HYPERLIPIDEMIA: ICD-10-CM

## 2022-09-01 DIAGNOSIS — M54.42 CHRONIC BILATERAL LOW BACK PAIN WITH BILATERAL SCIATICA: ICD-10-CM

## 2022-09-01 DIAGNOSIS — M54.41 CHRONIC BILATERAL LOW BACK PAIN WITH BILATERAL SCIATICA: ICD-10-CM

## 2022-09-01 DIAGNOSIS — G89.29 CHRONIC BILATERAL LOW BACK PAIN WITH BILATERAL SCIATICA: ICD-10-CM

## 2022-09-01 DIAGNOSIS — G25.81 RESTLESS LEG SYNDROME: Primary | ICD-10-CM

## 2022-09-01 DIAGNOSIS — M54.16 LUMBAR RADICULOPATHY: ICD-10-CM

## 2022-09-01 PROCEDURE — 99214 OFFICE O/P EST MOD 30 MIN: CPT | Performed by: FAMILY MEDICINE

## 2022-09-01 RX ORDER — GABAPENTIN 100 MG/1
CAPSULE ORAL
Qty: 90 CAPSULE | Refills: 1 | Status: SHIPPED | OUTPATIENT
Start: 2022-09-01

## 2022-09-01 RX ORDER — PRAMIPEXOLE DIHYDROCHLORIDE 0.25 MG/1
TABLET ORAL
Qty: 90 TABLET | Refills: 3 | Status: SHIPPED | OUTPATIENT
Start: 2022-09-01

## 2022-09-01 NOTE — PROGRESS NOTES
FAMILY PRACTICE OFFICE VISIT       NAME: Sudhakar Jiang  AGE: 61 y o  SEX: female       : 1965        MRN: [id number]        Assessment and Plan     1  Restless leg syndrome  -     pramipexole (MIRAPEX) 0 25 mg tablet; Take 1 tablet once a day after dinner  -     Vitamin B12; Future  -     Magnesium; Future    2  Lumbar radiculopathy  -     Ambulatory referral to Pain Management; Future  -     XR spine lumbar minimum 4 views non injury; Future; Expected date: 2022  -     MRI lumbar spine wo contrast; Future; Expected date: 2022  -     gabapentin (NEURONTIN) 100 mg capsule; Take 1 capsule at bedtime for 2 days then increase dose to 2 capsules at bedtime for 2 days, then increase dose up to 3 capsules at bedtime if needed    3  Chronic bilateral low back pain with bilateral sciatica  -     Ambulatory referral to Pain Management; Future  -     XR spine lumbar minimum 4 views non injury; Future; Expected date: 2022  -     MRI lumbar spine wo contrast; Future; Expected date: 2022    4  Other hyperlipidemia  -     Lipid Panel with Direct LDL reflex; Future   I suspect patient's leg pain is multifactorial related to our last, PLMS and lumbar radiculopathy  With that in mind she will continue on Mirapex at dinner and will start gabapentin at night  Low back pain has been quite bothersome  Patient will proceed with x-rays and start physical therapy  If symptoms persist-she will schedule MRI of lumbar spine and will pursue further evaluation with [facility]  BMI Counseling: Body mass index is 26 63 kg/m²  The BMI is above normal  Nutrition recommendations include encouraging healthy choices of fruits and vegetables, consuming healthier snacks, moderation in carbohydrate intake, reducing intake of saturated and trans fat and reducing intake of cholesterol  Exercise recommendations include exercising 3-5 times per week  No pharmacotherapy was ordered   Patient referred to PCP  Rationale for BMI follow-up plan is due to patient being overweight or obese  Depression Screening and Follow-up Plan: Patient was screened for depression during today's encounter  They screened negative with a PHQ-2 score of 0  Patient Instructions   Please start physical therapy  If back pain symptoms will not improve in 6 weeks after physical therapy-please proceed with MRI of lumbar spine  Please schedule evaluation [place] [facility] [place] [facility] [place]  Please take Mirapex after dinner  Please take gabapentin at night, you will start with 1 capsule daily and will titrate dose up to 3 capsules a day  You may take muscle relaxant strictly as needed for back pain, it may make you feel tired  Please send me a [email or web address] message with an update in a few weeks, if needed we can adjust doses of medication  Soak right index finger and half water have vinegar for 20 minutes for at least 30 days  If nail discoloration persist-we will arrange Dermatology consult          No follow-ups on file  Discussed with the patient and all questioned fully answered  She will call me if any problems arise  M*Modal software was used to dictate this note  It may contain errors with dictating incorrect words/spelling  Please contact provider directly with any questions  Chief Complaint     Chief Complaint   Patient presents with    Rest Leg Syndrome     Since Covid the leg painful/tingle/ache front LE with radiation to upper leg    Gynecologic Exam     Seen 4/22/2022- no pap at that visit-see note Last pap 1/16/22     Finger Nail     Right index finger nail is discolored in corner       History of Present Illness     Patient presents for evaluation of ongoing leg cramps, she reports worsening of RLS symptoms after recent COVID-19 infection  Patient reports ongoing urge to move her legs in the evening, symptoms are definitely worse at night  H/O PLMS Dxed with sleep study,treated by Dr Ch  Patient has tried Mirapex- worked WELL but eventually medication has become less effective with time  Flare of same s/o a few years later after Lyme disease, patient  went  back on Mirapex- helped but did not alleviate symptoms completely    6/28/22- COVID infection,Temp up to 102 5 x 3 days of fever, resolved  No chest tightness, cough shortness of breath  Ongoing leg cramps since  Muscle relaxant (tizanidine) does not control s/o  completely  Current dose of Tizanidine is 2 mg     Recent evaluation by Orthopedic surgery for evaluation of knee pain  Reportedly,  Dr Ashu Parikh goal is concerned about lumbar radiculopathy  Patient does have chronic low back pain and muscle relaxant has actually helped with those symptoms  Lower back feels tight and stiff after prolonged sitting , left leg is weaker than right      Left  Eye twitchi and photophobia after drinking caffeinated beverage/green tea      Patient remains on natural remedies due to elevated cholesterol  Most recent labs performed in April revealed total cholesterol of 220 with LDL of 146  Patient is reluctant to take cholesterol lower prescription medications  She is motivated to improve her diet and lose weight  Gynecologic Exam  Associated symptoms include back pain  Review of Systems   Review of Systems   Constitutional: Negative  HENT: Negative  Eyes: Negative  Respiratory: Negative  Gastrointestinal: Negative  Endocrine: Negative  Genitourinary: Negative  Musculoskeletal: Positive for back pain and myalgias  Skin: Negative  Allergic/Immunologic: Negative  Neurological: Negative  Hematological: Negative  Psychiatric/Behavioral: Positive for sleep disturbance         Active Problem List     Patient Active Problem List   Diagnosis    Arthralgia of right acromioclavicular joint    Cyst of pancreas    Fatigue    Situational anxiety    Encounter for gynecological examination (general) (routine) without abnormal findings    Weight gain    Cystocele with small rectocele and uterine descent    Acne    Restless leg syndrome    Small bowel obstruction (HCC)    Postmenopausal bleeding    Eustachian tube dysfunction, left    Hearing decreased, bilateral    Strain of deltoid muscle, left, initial encounter    Family history of colonic polyps    Iliotibial band syndrome, left    Lumbar radiculopathy    Chronic bilateral low back pain with bilateral sciatica    Other hyperlipidemia       Past Medical History:  Past Medical History:   Diagnosis Date    Lyme disease     Small bowel obstruction (HCC)        Past Surgical History:  Past Surgical History:   Procedure Laterality Date    COLONOSCOPY  2013    DILATION AND CURETTAGE OF UTERUS      OVARIAN CYST REMOVAL  1995    WI LAP,DIAGNOSTIC ABDOMEN N/A 2/21/2021    Procedure: LAPAROSCOPY DIAGNOSTIC, exploratory laparotomy, lysis of adhesions, REDUCTION OF INTERNAL HERNIA;  Surgeon: Ivon Aguirre MD;  Location: [facility];  Service: General       Family History:  Family History   Problem Relation Age of Onset    Atrial fibrillation Mother     Hyperlipidemia Mother     Heart disease Father     Hypertension Father     Hyperlipidemia Father     Diabetes Father     Prostate cancer Maternal Grandfather 48    Prostate cancer Maternal Uncle 61    Breast cancer Paternal Aunt 28       Social History:  Social History     Socioeconomic History    Marital status:      Spouse name: Not on file    Number of children: Not on file    Years of education: Not on file    Highest education level: Not on file   Occupational History    Not on file   Tobacco Use    Smoking status: Never Smoker    Smokeless tobacco: Never Used   Vaping Use    Vaping Use: Never used   Substance and Sexual Activity    Alcohol use: Yes     Comment: occasional    Drug use: No    Sexual activity: Yes     Partners: Male     Birth control/protection: Post-menopausal Other Topics Concern    Not on file   Social History Narrative    Not on file     Social Determinants of Health     Financial Resource Strain: Not on file   Food Insecurity: Not on file   Transportation Needs: Not on file   Physical Activity: Not on file   Stress: Not on file   Social Connections: Not on file   Intimate Partner Violence: Not on file   Housing Stability: Not on file         Objective     Vitals:    09/01/22 1127   BP: 110/60   BP Location: Left arm   Patient Position: Sitting   Cuff Size: Standard   Pulse: 97   Resp: 16   Temp: 98 4 °F (36 9 °C)   TempSrc: Temporal   SpO2: 98%   Weight: 74 8 kg (165 lb)   Height: 5' 6" (1 676 m)       Wt Readings from Last 3 Encounters:   09/01/22 74 8 kg (165 lb)   08/03/22 74 4 kg (164 lb)   06/29/22 73 8 kg (162 lb 9 6 oz)       Physical Exam  Vitals and nursing note reviewed  Constitutional:       Appearance: Normal appearance  Musculoskeletal:      Right lower leg: No edema  Left lower leg: No edema  Skin:     Comments: Inner surface of right fingernail with yellow/green discoloration   Neurological:      General: No focal deficit present  Mental Status: She is alert and oriented to person, place, and time  Mental status is at baseline  Cranial Nerves: No cranial nerve deficit  Psychiatric:         Mood and Affect: Mood normal          Behavior: Behavior normal          Thought Content:  Thought content normal           Pertinent Laboratory/Diagnostic Studies:    Lab Results   Component Value Date    WBC 4 02 (L) 04/26/2022    HGB 14 2 04/26/2022    HCT 43 3 04/26/2022    MCV 92 04/26/2022     04/26/2022       No results found for: TSH    Lab Results   Component Value Date    CHOL 204 07/31/2015     Lab Results   Component Value Date    TRIG 92 04/26/2022     Lab Results   Component Value Date    HDL 56 04/26/2022     Lab Results   Component Value Date    LDLCALC 146 (H) 04/26/2022     Lab Results   Component Value Date    HGBA1C 5 6 04/26/2022     Lab Results   Component Value Date    SODIUM 136 04/26/2022    K 3 9 04/26/2022     04/26/2022    CO2 31 04/26/2022    ANIONGAP 6 04/24/2014    AGAP 1 (L) 04/26/2022    BUN 14 04/26/2022    CREATININE 0 79 04/26/2022    GLUC 128 02/20/2021    GLUF 111 (H) 04/26/2022    CALCIUM 9 4 04/26/2022    AST 15 04/26/2022    ALT 35 04/26/2022    ALKPHOS 62 04/26/2022    PROT 7 3 04/24/2014    TP 7 7 04/26/2022    BILITOT 0 38 04/24/2014    TBILI 0 56 04/26/2022    EGFR 83 04/26/2022       Orders Placed This Encounter   Procedures    XR spine lumbar minimum 4 views non injury    MRI lumbar spine wo contrast    Vitamin B12    Magnesium    Lipid Panel with Direct LDL reflex    Ambulatory referral to Pain Management       ALLERGIES:  No Known Allergies    Current Medications     Current Outpatient Medications   Medication Sig Dispense Refill    Calcium Carb-Cholecalciferol 1000-800 MG-UNIT TABS Take by mouth daily        gabapentin (NEURONTIN) 100 mg capsule Take 1 capsule at bedtime for 2 days then increase dose to 2 capsules at bedtime for 2 days, then increase dose up to 3 capsules at bedtime if needed 90 capsule 1    KRILL OIL PO Take 1 capsule by mouth daily      LORazepam (ATIVAN) 0 5 mg tablet Take 1 tablet (0 5 mg total) by mouth as needed for anxiety (Launch) Take as need for dental work 30 tablet 0    Multiple Vitamin (MULTI-VITAMIN DAILY) TABS Take 1 tablet by mouth daily      NON FORMULARY Take 1 each by mouth daily NUTRIM OAT POWDER-SPRINKLE OVER FOOD ONCE A DAY      pramipexole (MIRAPEX) 0 25 mg tablet Take 1 tablet once a day after dinner 90 tablet 3    Sodium Fluoride 5000 PPM 1 1 % PSTE       tiZANidine (ZANAFLEX) 2 mg tablet Take 1-2 tabs qhs 60 tablet 2    tretinoin (RETIN-A) 0 025 % cream Apply thin film topically once daily 45 g 3    Iron-Vitamin C 100-250 MG TABS Take by mouth once a week   (Patient not taking: Reported on 9/1/2022)       No current facility-administered medications for this visit         Medications Discontinued During This Encounter   Medication Reason    amoxicillin (AMOXIL) 875 mg tablet Therapy completed    cephalexin (KEFLEX) 500 mg capsule Therapy completed    methylPREDNISolone 4 MG tablet therapy pack Therapy completed    pramipexole (MIRAPEX) 0 25 mg tablet Reorder       Health Maintenance     Health Maintenance   Topic Date Due    Cervical Cancer Screening  01/16/2021    COVID-19 Vaccine (4 - Booster for Moderna series) 04/03/2022    Influenza Vaccine (1) 09/01/2022    Annual Physical  04/22/2023    Breast Cancer Screening: Mammogram  04/25/2023    Depression Screening  09/01/2023    BMI: Adult  09/01/2023    BMI: Followup Plan  09/05/2023    Colorectal Cancer Screening  02/09/2027    DTaP,Tdap,and Td Vaccines (3 - Td or Tdap) 07/01/2031    HIV Screening  Completed    Hepatitis C Screening  Completed    Pneumococcal Vaccine: Pediatrics (0 to 5 Years) and At-Risk Patients (6 to 59 Years)  Aged Out    HIB Vaccine  Aged Out    Hepatitis B Vaccine  Aged Out    IPV Vaccine  Aged Out    Hepatitis A Vaccine  Aged Out    Meningococcal ACWY Vaccine  Aged Out    HPV Vaccine  Aged [employer] History   Administered Date(s) Administered    COVID-19 MODERNA VACC 0 25 ML IM BOOSTER 12/03/2021    COVID-19 MODERNA VACC 0 5 ML IM 12/30/2020, 01/26/2021    INFLUENZA 10/11/2020    Tdap 06/16/2017, 07/01/2021       Marie Bolton MD

## 2022-09-01 NOTE — PATIENT INSTRUCTIONS
Please start physical therapy  If back pain symptoms will not improve in 6 weeks after physical therapy-please proceed with MRI of lumbar spine  Please schedule evaluation with [facility] in [place]  Please take Mirapex after dinner  Please take gabapentin at night, you will start with 1 capsule daily and will titrate dose up to 3 capsules a day  You may take muscle relaxant strictly as needed for back pain, it may make you feel tired  Please send me a [employer] message with an update in a few weeks, if needed we can adjust doses of medication  Soak right index finger and half water have vinegar for 20 minutes for at least 30 days    If nail discoloration persist-we will arrange Dermatology consult

## 2022-09-05 PROBLEM — M54.41 CHRONIC BILATERAL LOW BACK PAIN WITH BILATERAL SCIATICA: Status: ACTIVE | Noted: 2022-09-05

## 2022-09-05 PROBLEM — M54.42 CHRONIC BILATERAL LOW BACK PAIN WITH BILATERAL SCIATICA: Status: ACTIVE | Noted: 2022-09-05

## 2022-09-05 PROBLEM — G89.29 CHRONIC BILATERAL LOW BACK PAIN WITH BILATERAL SCIATICA: Status: ACTIVE | Noted: 2022-09-05

## 2022-09-05 PROBLEM — E78.49 OTHER HYPERLIPIDEMIA: Status: ACTIVE | Noted: 2022-09-05

## 2022-09-05 PROBLEM — M54.16 LUMBAR RADICULOPATHY: Status: ACTIVE | Noted: 2022-09-05

## 2022-09-21 ENCOUNTER — APPOINTMENT (OUTPATIENT)
Dept: LAB | Facility: CLINIC | Age: 57
End: 2022-09-21
Payer: COMMERCIAL

## 2022-09-21 DIAGNOSIS — E78.49 OTHER HYPERLIPIDEMIA: ICD-10-CM

## 2022-09-21 DIAGNOSIS — G25.81 RESTLESS LEG SYNDROME: ICD-10-CM

## 2022-09-21 LAB
CHOLEST SERPL-MCNC: 196 MG/DL
HDLC SERPL-MCNC: 58 MG/DL
LDLC SERPL CALC-MCNC: 123 MG/DL (ref 0–100)
MAGNESIUM SERPL-MCNC: 2.6 MG/DL (ref 1.6–2.6)
TRIGL SERPL-MCNC: 77 MG/DL
VIT B12 SERPL-MCNC: 457 PG/ML (ref 100–900)

## 2022-09-21 PROCEDURE — 80061 LIPID PANEL: CPT

## 2022-09-21 PROCEDURE — 83735 ASSAY OF MAGNESIUM: CPT

## 2022-09-21 PROCEDURE — 36415 COLL VENOUS BLD VENIPUNCTURE: CPT

## 2022-09-21 PROCEDURE — 82607 VITAMIN B-12: CPT

## 2022-09-22 ENCOUNTER — EVALUATION (OUTPATIENT)
Dept: PHYSICAL THERAPY | Facility: CLINIC | Age: 57
End: 2022-09-22
Payer: COMMERCIAL

## 2022-09-22 DIAGNOSIS — G89.29 CHRONIC BILATERAL LOW BACK PAIN WITH BILATERAL SCIATICA: ICD-10-CM

## 2022-09-22 DIAGNOSIS — M54.42 CHRONIC BILATERAL LOW BACK PAIN WITH BILATERAL SCIATICA: ICD-10-CM

## 2022-09-22 DIAGNOSIS — M54.41 CHRONIC BILATERAL LOW BACK PAIN WITH BILATERAL SCIATICA: ICD-10-CM

## 2022-09-22 DIAGNOSIS — M54.16 LUMBAR RADICULOPATHY: Primary | ICD-10-CM

## 2022-09-22 PROCEDURE — 97161 PT EVAL LOW COMPLEX 20 MIN: CPT | Performed by: PHYSICAL THERAPIST

## 2022-09-22 NOTE — PROGRESS NOTES
PT Evaluation     Today's date: 2022  Patient name: Ceasar Klinefelter  : 1965  MRN: [id number]  Referring provider: Gama Jaramillo,*  Dx:   Encounter Diagnosis     ICD-10-CM    1  Lumbar radiculopathy  M54 16    2  Chronic bilateral low back pain with bilateral sciatica  M54 42     M54 41     G89 29                   Assessment  Assessment details: Ceasar Klinefelter is a 61 y o  female who presents with pain and decreased ROM  Due to these impairments, patient has difficulty performing ADL's, work-related activities, ambulation  Patient's clinical presentation is consistent with their referring diagnosis of Lumbar radiculopathy  (primary encounter diagnosis), Chronic bilateral low back pain with bilateral sciatica  Patient has been educated in home exercise program and plan of care   Patient would benefit from skilled physical therapy services to address their aforementioned functional limitations and progress towards prior level of function and independence with home exercise program      Impairments: abnormal gait, abnormal or restricted ROM, activity intolerance, impaired physical strength, lacks appropriate home exercise program, pain with function, poor posture  and poor body mechanics  Understanding of Dx/Px/POC: good   Prognosis: good    Goals  Short term goals to be accomplished in 3 weeks:  STG 1: Pt will demo independence with postural management  STG 2: Pt will demo I with HEP to maximize progress between therapy sessions  STG 3: Pt will demo L/S AROM < or = min loss throughout to promote improved functional mobility and body mechanics  STG 4:  Pt will reports pain dec freq and intensity 50%  STG 5: Pt will deny sleep disturbance    Long term goals to be accomplished in 6 weeks:  LTG 1: Pt will demo good body mech with >75% functional challenges to prevent reinjury  LTG 2: Pt will be able to return to work pain free as per PLOF    Plan  Plan details:  HEP development, stretching, strengthening, A/AA/PROM, joint mobilizations, posture education, STM/MI as needed to reduce muscle tension, muscle reeducation, PLOC discussed and agreed upon with patient  Patient would benefit from: PT eval and skilled physical therapy  Planned modality interventions: cryotherapy, thermotherapy: hydrocollator packs and traction  Planned therapy interventions: manual therapy, neuromuscular re-education, self care, therapeutic activities, therapeutic exercise and home exercise program  Frequency: 2x week  Duration in weeks: 6  Treatment plan discussed with: patient        Subjective Evaluation    History of Present Illness  Mechanism of injury: Pt reports she has tingling in both her legs L > R, off and on for over a year, having COVID in  she feels this exacerbated it  She reports that she thought it was her knees  She saw her doctor and was told it was not her knees and was her lumbar spine  She has been taking a steroid  She reports pain is worst at night and her restless leg syndrome is provoking even more  Pt has also been prescribed gabapentin by her PCP which she takes at night and takes restless leg syndrome meds at dinner time  This allows her to sleep  This has been progressively worsening  She has pain even tying her shoes  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  Stiffness and sore in the morning, works out in a matter of an hour's time, this creeps back in in the evening  Pt is modifying most of her ADLs that require her to bend forward or pull her foot up to her  Sleep is disturbed  She feels like she can barely walk after work  She lifts her legs with her arms to get into bed  Pain is in L leg greater than R, especially in L hip  Pt goals/thoughts for success: To be able to sleep through the night without pain, and get through an entire day without pain     Quality of life: good    Pain  At worst pain ratin          Objective     Concurrent Complaints  Positive for disturbed sleep  Negative for night pain    Postural Observations  Seated posture: poor  Standing posture: fair        Neurological Testing     Sensation     Lumbar   Left   Diminished: light touch    Right   Intact: light touch    Comments   Left light touch: Lateral thigh, anterior thigh, lateral shank    Reflexes   Left   Patellar (L4): normal (2+)  Achilles (S1): normal (2+)    Right   Patellar (L4): normal (2+)  Achilles (S1): normal (2+)    Additional Neurological Details  Myotomes WNL    Active Range of Motion     Lumbar   Flexion:  WFL  Extension:  with pain Restriction level: minimal  Left lateral flexion:  with pain Restriction level: minimal  Right lateral flexion:  [email or web address]    Joint Play     Hypomobile: L4, L5 and S1   Mechanical Assessment    Cervical      Thoracic      Lumbar    Standing extension: repeated movements  Pain intensity: better  Pain level: decreased              Eval/ Re-eval Auth #/ Referral # Total visits Start date  Expiration date Total active units  Total manual units  PT only or  PT+OT?     NO AUTH REQ                    Precautions: Standard      Visit 1             9/22/22                                                   Neuro Re-Ed             Posture Edu  lumbar roll                                                                                          Ther Ex             MARITZA Over fulcrum 2x10            PAUL  1'            Prone B knee flex 10x                                                                             Pt edu/HEP Centralization, issued and printed                                                                             Modalities

## 2022-09-29 ENCOUNTER — APPOINTMENT (OUTPATIENT)
Dept: PHYSICAL THERAPY | Facility: CLINIC | Age: 57
End: 2022-09-29
Payer: COMMERCIAL

## 2022-09-29 ENCOUNTER — OFFICE VISIT (OUTPATIENT)
Dept: PHYSICAL THERAPY | Facility: CLINIC | Age: 57
End: 2022-09-29
Payer: COMMERCIAL

## 2022-09-29 DIAGNOSIS — M54.42 CHRONIC BILATERAL LOW BACK PAIN WITH BILATERAL SCIATICA: ICD-10-CM

## 2022-09-29 DIAGNOSIS — G89.29 CHRONIC BILATERAL LOW BACK PAIN WITH BILATERAL SCIATICA: ICD-10-CM

## 2022-09-29 DIAGNOSIS — M54.41 CHRONIC BILATERAL LOW BACK PAIN WITH BILATERAL SCIATICA: ICD-10-CM

## 2022-09-29 DIAGNOSIS — M54.16 LUMBAR RADICULOPATHY: Primary | ICD-10-CM

## 2022-09-29 PROCEDURE — 97110 THERAPEUTIC EXERCISES: CPT | Performed by: PHYSICAL THERAPIST

## 2022-09-29 NOTE — PROGRESS NOTES
Daily Note     Today's date: 2022  Patient name: Crys Baer  : 1965  MRN: [id number]  Referring provider: Vel Penny,*  Dx:   Encounter Diagnosis     ICD-10-CM    1  Lumbar radiculopathy  M54 16    2  Chronic bilateral low back pain with bilateral sciatica  M54 42     M54 41     G89 29                   Subjective: Pt reports that overall she is feeling better than she had been  She reports that overall her symptoms have been noticed to be of lesser intensity and frequency  Pt PCP req MRI in 6 weeks  Objective: See treatment diary below  Baseline AROM Lumnbar spine: fleixon WFL  Ext WFL, R SGIS painful and mod gallagher, L SGIS WFL  Post session baseline pain free but "tight" R SGIS with trace-min limitation  Assessment: Tolerated treatment well  Patient demo overall good repsone to est POC and demo good potential for accomplishing est LTGs  Plan: Continue per plan of care  Eval/ Re-eval Auth #/ Referral # Total visits Start date  Expiration date Total active units  Total manual units  PT only or  PT+OT?     NO AUTH REQ                    Precautions: Standard      Visit 1 2            22                                                  Neuro Re-Ed             Posture Edu  lumbar roll Rev                                                                                         Ther Ex             MARITZA Over fulcrum 2x10 3x10            PAUL  1' 10s x 5 (2 rounds           Prone B knee flex 10x 2x10            Hip flexo stretch  SOS prone 5x10s           Hip flexor lunge str  10x                                                  Pt edu/HEP Centralization, issued and printed Rev                                                                            Modalities

## 2022-11-11 ENCOUNTER — TELEPHONE (OUTPATIENT)
Dept: FAMILY MEDICINE CLINIC | Facility: CLINIC | Age: 57
End: 2022-11-11

## 2022-11-11 NOTE — TELEPHONE ENCOUNTER
Hip and groin area is entirely different part of the body, so hip MRI should be ordered and prior authorized separately  Unfortunately it cannot be added to tomorrow's study which will evaluate lumbar spine  We will have to take it step by step    Thank you

## 2022-11-11 NOTE — TELEPHONE ENCOUNTER
Patient called to report that she has been having pain in her left hip/groin area and was wondering if this could be added to tomorrows MRI or something that would need to be evaluated first

## 2022-11-12 ENCOUNTER — HOSPITAL ENCOUNTER (OUTPATIENT)
Dept: RADIOLOGY | Facility: HOSPITAL | Age: 57
Discharge: HOME | End: 2022-11-12

## 2022-11-12 DIAGNOSIS — M54.42 CHRONIC BILATERAL LOW BACK PAIN WITH BILATERAL SCIATICA: ICD-10-CM

## 2022-11-12 DIAGNOSIS — M54.41 CHRONIC BILATERAL LOW BACK PAIN WITH BILATERAL SCIATICA: ICD-10-CM

## 2022-11-12 DIAGNOSIS — M54.16 LUMBAR RADICULOPATHY: ICD-10-CM

## 2022-11-12 DIAGNOSIS — G89.29 CHRONIC BILATERAL LOW BACK PAIN WITH BILATERAL SCIATICA: ICD-10-CM

## 2022-11-16 ENCOUNTER — TELEPHONE (OUTPATIENT)
Dept: FAMILY MEDICINE CLINIC | Facility: CLINIC | Age: 57
End: 2022-11-16

## 2022-11-17 NOTE — TELEPHONE ENCOUNTER
Please contact patient  I reviewed results of MRI and sent her  result message tonight  It is very important that she schedules [facility]-up with [facility] as we have discussed during last office visit      Thank you

## 2022-11-18 ENCOUNTER — CONSULT (OUTPATIENT)
Dept: PAIN MEDICINE | Facility: CLINIC | Age: 57
End: 2022-11-18

## 2022-11-18 VITALS
HEIGHT: 66 IN | WEIGHT: 165 LBS | DIASTOLIC BLOOD PRESSURE: 80 MMHG | BODY MASS INDEX: 26.52 KG/M2 | HEART RATE: 88 BPM | SYSTOLIC BLOOD PRESSURE: 117 MMHG

## 2022-11-18 DIAGNOSIS — G89.29 CHRONIC BILATERAL LOW BACK PAIN WITH BILATERAL SCIATICA: ICD-10-CM

## 2022-11-18 DIAGNOSIS — M54.42 CHRONIC BILATERAL LOW BACK PAIN WITH BILATERAL SCIATICA: ICD-10-CM

## 2022-11-18 DIAGNOSIS — M54.16 LUMBAR RADICULOPATHY: ICD-10-CM

## 2022-11-18 DIAGNOSIS — M47.816 LUMBAR SPONDYLOSIS: Primary | ICD-10-CM

## 2022-11-18 DIAGNOSIS — M54.41 CHRONIC BILATERAL LOW BACK PAIN WITH BILATERAL SCIATICA: ICD-10-CM

## 2022-11-18 DIAGNOSIS — M25.552 LEFT HIP PAIN: ICD-10-CM

## 2022-11-18 DIAGNOSIS — M48.061 SPINAL STENOSIS OF LUMBAR REGION, UNSPECIFIED WHETHER NEUROGENIC CLAUDICATION PRESENT: ICD-10-CM

## 2022-11-18 NOTE — PROGRESS NOTES
Assessment  1  Lumbar spondylosis    2  Lumbar radiculopathy    3  Chronic bilateral low back pain with bilateral sciatica    4  Left hip pain    5  Spinal stenosis of lumbar region, unspecified whether neurogenic claudication present        Plan  70-year-old female referred by Dr Bernard Lundy, presenting for initial consultation regarding lumbosacral back pain that radiates into predominantly lateral aspect of bilateral lower extremities and occasionally into the left groin and hip with associated numbness and paresthesias  Pain began in July 2022 when the patient alfredo COVID  MRI of the lumbar spine November 12, 2022 shows disc bulging from L2-3 to L4-5 and facet arthrosis  Mild central and foraminal stenosis at from L2-3 to L4-5  Possible L4 encroachment bilaterally at L3-4  Physical therapy has been helpful and she does continue to do her home exercise program   She does take OTC NSAIDs intermittently with some relief  She does take Mirapex for RLS  She was taking gabapentin 100 mg p r n  and tizanidine 2 mg p r n  with some relief  She has not needed to take these medications for the past few weeks as she does feel that her symptoms are slowly improving  The patient's major complaint today is her left hip and groin pain  The patient's low back pain appears to be multifactorial including myofascial and facet mediated components  No evidence of sacroiliitis  Lower extremity symptoms likely radicular in nature stemming from stenosis  Left hip and groin pain seems to be hip related or potentially hip flexor in origin  1  I will order an x-ray of the left hip   2  May consider L4-5 LESI if the patient's lower extremity radicular symptoms worsen  Will hold off on interventional therapy at this time considering the patient's symptoms are quite mild   3   Patient may continue with ibuprofen OTC p r n  should not exceed more than 600 mg q 8 hours p r n    4  If the patient's radicular symptoms worsen I advised the patient to start her gabapentin at 100 mg q h s  and she needs to take this medicine consistently for to be effective  The patient did verbalized understanding  5  Patient may continue with tizanidine as prescribed   6  Patient will continue with her home exercise program  7  I will follow up the patient in 2 months or sooner if needed      My impressions and treatment recommendations were discussed in detail with the patient who verbalized understanding and had no further questions  Discharge instructions were provided  I personally saw and examined the patient and I agree with the above discussed plan of care  No orders of the defined types were placed in this encounter  No orders of the defined types were placed in this encounter  History of Present Illness    Eliazar Montoya is a 61 y o  female referred by Dr Megan Govea, presenting for initial consultation regarding lumbosacral back pain that radiates into predominantly lateral aspect of bilateral lower extremities and occasionally into the left groin and hip with associated numbness and paresthesias  Pain began in July 2022 when the patient alfredo COVID  She denies any bowel incontinence or saddle anesthesia  She does have some baseline intermittent bladder incontinence  This is unchanged  MRI of the lumbar spine November 12, 2022 shows disc bulging from L2-3 to L4-5 and facet arthrosis  Mild central and foraminal stenosis at from L2-3 to L4-5  Possible L4 encroachment bilaterally at L3-4  Physical therapy has been helpful and she does continue to do her home exercise program   She does take OTC NSAIDs intermittently with some relief  She does take Mirapex for RLS  She was taking gabapentin 100 mg p r n  and tizanidine 2 mg p r n  with some relief  She has not needed to take these medications for the past few weeks as she does feel that her symptoms are slowly improving    The patient's major complaint today is her left hip and groin pain  The patient rates her pain moderate and intermittent  The pain is worse in the evening  The pain is described as burning, numbness, sharp, pins and needles, and throbbing  The pain is decreased with standing, walking, and relaxation  The pain is increased with bending, sitting, and exercise  She does find some relief with heat and ice application and physical therapy  Other than as stated above, the patient denies any interval changes in medications, medical condition, mental condition, symptoms, or allergies since the last office visit  I have personally reviewed and/or updated the patient's past medical history, past surgical history, family history, social history, current medications, allergies, and vital signs today  Review of Systems   Constitutional: Negative for fever and unexpected weight change  HENT: Negative for trouble swallowing  Eyes: Negative for visual disturbance  Respiratory: Negative for shortness of breath and wheezing  Cardiovascular: Negative for chest pain and palpitations  Gastrointestinal: Negative for constipation, diarrhea, nausea and vomiting  Endocrine: Negative for cold intolerance, heat intolerance and polydipsia  Genitourinary: Negative for difficulty urinating and frequency  Musculoskeletal: Negative for arthralgias, gait problem, joint swelling and myalgias  Skin: Negative for rash  Neurological: Positive for headaches  Negative for dizziness, seizures, syncope and weakness  Hematological: Does not bruise/bleed easily  Psychiatric/Behavioral: Negative for dysphoric mood  All other systems reviewed and are negative        Patient Active Problem List   Diagnosis   • Arthralgia of right acromioclavicular joint   • Cyst of pancreas   • Fatigue   • Situational anxiety   • Encounter for gynecological examination (general) (routine) without abnormal findings   • Weight gain   • Cystocele with small rectocele and uterine descent   • Acne   • Restless leg syndrome   • Small bowel obstruction (HCC)   • Postmenopausal bleeding   • Eustachian tube dysfunction, left   • Hearing decreased, bilateral   • Strain of deltoid muscle, left, initial encounter   • Family history of colonic polyps   • Iliotibial band syndrome, left   • Lumbar radiculopathy   • Chronic bilateral low back pain with bilateral sciatica   • Other hyperlipidemia       Past Medical History:   Diagnosis Date   • Lyme disease    • Small bowel obstruction (HCC)        Past Surgical History:   Procedure Laterality Date   • COLONOSCOPY  2013   • DILATION AND CURETTAGE OF UTERUS     • OVARIAN CYST REMOVAL  1995   • HI LAP,DIAGNOSTIC ABDOMEN N/A 2/21/2021    Procedure: LAPAROSCOPY DIAGNOSTIC, exploratory laparotomy, lysis of adhesions, REDUCTION OF INTERNAL HERNIA;  Surgeon: Johnny Ocasio MD;  Location: [address] [place] [address];  Service: General       Family History   Problem Relation Age of Onset   • Atrial fibrillation Mother    • Hyperlipidemia Mother    • Heart disease Father    • Hypertension Father    • Hyperlipidemia Father    • Diabetes Father    • Prostate cancer Maternal Grandfather 48   • Prostate cancer Maternal Uncle 61   • Breast cancer Paternal Aunt 31       Social History     Occupational History   • Not on file   Tobacco Use   • Smoking status: Never   • Smokeless tobacco: Never   Vaping Use   • Vaping Use: Never used   Substance and Sexual Activity   • Alcohol use: Yes     Comment: occasional   • Drug use: No   • Sexual activity: Yes     Partners: Male     Birth control/protection: Post-menopausal       Current Outpatient Medications on File Prior to Visit   Medication Sig   • Calcium Carb-Cholecalciferol 1000-800 MG-UNIT TABS Take by mouth daily     • gabapentin (NEURONTIN) 100 mg capsule Take 1 capsule at bedtime for 2 days then increase dose to 2 capsules at bedtime for 2 days, then increase dose up to 3 capsules at bedtime if needed   • KRILL OIL PO Take 1 capsule by mouth daily   • LORazepam (ATIVAN) 0 5 mg tablet Take 1 tablet (0 5 mg total) by mouth as needed for anxiety (Launch) Take as need for dental work   • Multiple Vitamin (MULTI-VITAMIN DAILY) TABS Take 1 tablet by mouth daily   • NON FORMULARY Take 1 each by mouth daily NUTRIM OAT POWDER-SPRINKLE OVER FOOD ONCE A DAY   • pramipexole (MIRAPEX) 0 25 mg tablet Take 1 tablet once a day after dinner   • Sodium Fluoride 5000 PPM 1 1 % PSTE    • tiZANidine (ZANAFLEX) 2 mg tablet Take 1-2 tabs qhs   • tretinoin (RETIN-A) 0 025 % cream Apply thin film topically once daily   • Iron-Vitamin C 100-250 MG TABS Take by mouth once a week   (Patient not taking: Reported on 9/1/2022)     No current facility-administered medications on file prior to visit  No Known Allergies    Physical Exam    Ht 5' 6" (1 676 m)   Wt 74 8 kg (165 lb)   LMP 06/22/2016   BMI 26 63 kg/m²     Constitutional: normal, well developed, well nourished, alert, in no distress and non-toxic and no overt pain behavior  Eyes: anicteric  HEENT: grossly intact  Neck: supple, symmetric, trachea midline and no masses   Pulmonary:even and unlabored  Cardiovascular:No edema or pitting edema present  Skin:Normal without rashes or lesions and well hydrated  Psychiatric:Mood and affect appropriate  Neurologic:Cranial Nerves II-XII grossly intact  Musculoskeletal:normal gait  Bilateral lumbar paraspinals minimally tender to palpation from L3-L5  Bilateral SI joints and trochanteric flares nontender to palpation  Bilateral patellar and Achilles reflexes were 2/4 and symmetrical   No clonus was noted bilaterally  Bilateral lower extremity strength 5/5 in all muscle groups  Sensation intact to light touch in L3-S1 dermatomes bilaterally  Negative straight leg raise bilaterally  Negative Oskar's and Gaenslen's test bilaterally  Left groin pain reproduced with external rotation of the left hip    Negative Stinchfield test on the left     Imaging    Study Result  Narrative & Impression   MRI LUMBAR SPINE WITHOUT CONTRAST   INDICATION: M54 16: Radiculopathy, lumbar region   M54 42: Lumbago with sciatica, left side   M54 41: Lumbago with sciatica, right side   G89 29: Other chronic pain  A   COMPARISON: None  TECHNIQUE: Multiplanar, multisequence imaging of the lumbar spine was performed  Skyler Marquis FINDINGS:   VERTEBRAL SEGMENT AND DISC SPACES:   There are 5 lumbar type vertebral bodies  T10-T11 and T11-T12: These levels of the thoracic spine are only included on sagittal images without the aid of axial images for this examination tailored for the lumbar spine  There is no significant spinal canal or neural foraminal narrowing   evident  T12-L1 and L1-L2: No significant abnormality evident  L2-L3: Evidence of mild narrowing of the spinal canal and neural foraminal narrowing of a mild to moderate degree due to disc bulge in combination with hypertrophic degenerative change of the facet joints and ligamenta flava  Mild to moderate disc   degeneration with annular fissuring, old Schmorl's node deformity right anterior superior endplate of L3, anterior osteophytosis and Modic type II signal alteration  L3-L4: Evidence of mild to moderate narrowing of the spinal canal with possible mild neural encroachment upon the intraspinal bilateral L4 nerve roots, and neural foraminal narrowing of a moderate degree on the right and mild to moderate degree on the   left due to disc bulge, right subarticular and foraminal zone disc protrusion in combination with hypertrophic degenerative change of the facet joints and ligamenta flava  Moderate disc degeneration with annular fissuring, old Schmorl's node deformities   of the adjacent vertebral body endplates and Modic type I signal alteration     C4-C5: Evidence of mild to moderate narrowing of the spinal canal and neural foraminal narrowing of a mild to moderate degree bilaterally due to disc bulge in combination with hypertrophic degenerative change of the facet joints and ligamenta flava  Moderate disc degeneration with annular fissuring  L5-S1: No significant abnormality evident  SPINAL CORD: No spinal cord abnormality evident  EXTRASPINAL STRUCTURES: No significant abnormality evident  MARROW SIGNAL: Discogenic Modic signal alteration  No other significant abnormality evident  IMPRESSION:   Lumbar spondylosis associated with areas of narrowing of the spinal canal and neural foramina detailed level by level above and most notable at L3-L4 where there is possible neural encroachment upon intraspinal bilateral L4 nerve roots  Workstation performed:  [id number]       PACS Images     Show images for XR knee 3 vw right non injury  Study Result    Narrative & Impression   RIGHT KNEE     INDICATION:   M25 561: Pain in right knee  M25 562: Pain in left knee      COMPARISON:  6/22/2016 MRI     VIEWS:  XR KNEE 3 VW RIGHT NON INJURY   Images: 3     FINDINGS:  Upper pole patellar enthesophyte again evident     There is no acute fracture or dislocation      There is no joint effusion      No significant degenerative changes      No lytic or blastic osseous lesion      Soft tissues are unremarkable      IMPRESSION:     No acute osseous abnormality      Stable exam     Workstation performed: [id number]

## 2022-11-25 ENCOUNTER — HOSPITAL ENCOUNTER (OUTPATIENT)
Dept: RADIOLOGY | Facility: HOSPITAL | Age: 57
Discharge: HOME | End: 2022-11-25
Attending: ANESTHESIOLOGY

## 2022-11-25 DIAGNOSIS — M25.552 LEFT HIP PAIN: ICD-10-CM

## 2022-12-08 ENCOUNTER — PATIENT MESSAGE (OUTPATIENT)
Dept: PAIN MEDICINE | Facility: CLINIC | Age: 57
End: 2022-12-08

## 2022-12-09 ENCOUNTER — TELEPHONE (OUTPATIENT)
Dept: PAIN MEDICINE | Facility: CLINIC | Age: 57
End: 2022-12-09

## 2022-12-09 ENCOUNTER — PATIENT MESSAGE (OUTPATIENT)
Dept: PAIN MEDICINE | Facility: CLINIC | Age: 57
End: 2022-12-09

## 2022-12-09 NOTE — TELEPHONE ENCOUNTER
Caller: Pt    Doctor: Dr Luz Maria Rosen    Reason for call: Schedule a procedure     Call back#: [phone]

## 2022-12-09 NOTE — PATIENT COMMUNICATION
Since conservative measures are not helping, I can offer the patient a left intra-articular hip injection under fluoroscopic guidance

## 2022-12-12 DIAGNOSIS — M25.552 LEFT HIP PAIN: Primary | ICD-10-CM

## 2022-12-12 NOTE — TELEPHONE ENCOUNTER
Patient contacted and scheduled for left intra-articular hip injection  All pre procedure instructions were given to patient   Nothing to eat or drink for 1 hour prior  Loose fitting clothing   NSAIDS, ANTICOAG'S OKAY   Denies Antibx     Needs   - IF NO  WILL NEED TO SIGN AMA   Patient directed to call the office if becomes sick, as we will most likely reschedule       Insurance auth received but is not a guarantee of payment per your insurance company's authorization disclaimer and it is your responsibility to verify your benefits     COVID -19 screening complete

## 2022-12-14 ENCOUNTER — HOSPITAL ENCOUNTER (OUTPATIENT)
Dept: RADIOLOGY | Facility: CLINIC | Age: 57
Discharge: HOME | End: 2022-12-14

## 2022-12-14 VITALS
SYSTOLIC BLOOD PRESSURE: 133 MMHG | DIASTOLIC BLOOD PRESSURE: 83 MMHG | HEART RATE: 75 BPM | RESPIRATION RATE: 18 BRPM | TEMPERATURE: 98 F | OXYGEN SATURATION: 97 %

## 2022-12-14 DIAGNOSIS — M25.552 LEFT HIP PAIN: ICD-10-CM

## 2022-12-14 RX ORDER — BUPIVACAINE HCL/PF 2.5 MG/ML
30 VIAL (ML) INJECTION ONCE
Status: COMPLETED | OUTPATIENT
Start: 2022-12-14 | End: 2022-12-14

## 2022-12-14 RX ORDER — METHYLPREDNISOLONE ACETATE 40 MG/ML
40 INJECTION, SUSPENSION INTRA-ARTICULAR; INTRALESIONAL; INTRAMUSCULAR; PARENTERAL; SOFT TISSUE ONCE
Status: COMPLETED | OUTPATIENT
Start: 2022-12-14 | End: 2022-12-14

## 2022-12-14 RX ORDER — LIDOCAINE HYDROCHLORIDE 10 MG/ML
5 INJECTION, SOLUTION EPIDURAL; INFILTRATION; INTRACAUDAL; PERINEURAL ONCE
Status: COMPLETED | OUTPATIENT
Start: 2022-12-14 | End: 2022-12-14

## 2022-12-14 RX ADMIN — LIDOCAINE HYDROCHLORIDE 4 ML: 10 INJECTION, SOLUTION EPIDURAL; INFILTRATION; INTRACAUDAL; PERINEURAL at 15:07

## 2022-12-14 RX ADMIN — METHYLPREDNISOLONE ACETATE 40 MG: 40 INJECTION, SUSPENSION INTRA-ARTICULAR; INTRALESIONAL; INTRAMUSCULAR; SOFT TISSUE at 15:07

## 2022-12-14 RX ADMIN — BUPIVACAINE HYDROCHLORIDE 2 ML: 2.5 INJECTION, SOLUTION EPIDURAL; INFILTRATION; INTRACAUDAL at 15:07

## 2022-12-14 RX ADMIN — IOHEXOL 1 ML: 300 INJECTION, SOLUTION INTRAVENOUS at 15:07

## 2022-12-14 NOTE — H&P
History of Present Illness: The patient is a 61 y o  female who presents with complaints of left hip and groin pain      Past Medical History:   Diagnosis Date   • Lyme disease    • Small bowel obstruction ([address] )        Past Surgical History:   Procedure Laterality Date   • COLONOSCOPY  2013   • DILATION AND CURETTAGE OF UTERUS     • OVARIAN CYST REMOVAL  1995   • NM LAP,DIAGNOSTIC ABDOMEN N/A 2/21/2021    Procedure: LAPAROSCOPY DIAGNOSTIC, exploratory laparotomy, lysis of adhesions, REDUCTION OF INTERNAL HERNIA;  Surgeon: Chadd Rothman MD;  Location: [facility];  Service: General         Current Outpatient Medications:   •  Calcium Carb-Cholecalciferol 1000-800 MG-UNIT TABS, Take by mouth daily  , Disp: , Rfl:   •  gabapentin (NEURONTIN) 100 mg capsule, Take 1 capsule at bedtime for 2 days then increase dose to 2 capsules at bedtime for 2 days, then increase dose up to 3 capsules at bedtime if needed, Disp: 90 capsule, Rfl: 1  •  Iron-Vitamin C 100-250 MG TABS, Take by mouth once a week   (Patient not taking: Reported on 9/1/2022), Disp: , Rfl:   •  KRILL OIL PO, Take 1 capsule by mouth daily, Disp: , Rfl:   •  LORazepam (ATIVAN) 0 5 mg tablet, Take 1 tablet (0 5 mg total) by mouth as needed for anxiety (Launch) Take as need for dental work, Disp: 30 tablet, Rfl: 0  •  Multiple Vitamin (MULTI-VITAMIN DAILY) TABS, Take 1 tablet by mouth daily, Disp: , Rfl:   •  NON FORMULARY, Take 1 each by mouth daily NUTRIM OAT POWDER-SPRINKLE OVER FOOD ONCE A DAY, Disp: , Rfl:   •  pramipexole (MIRAPEX) 0 25 mg tablet, Take 1 tablet once a day after dinner, Disp: 90 tablet, Rfl: 3  •  Sodium Fluoride 5000 PPM 1 1 % PSTE, , Disp: , Rfl:   •  tiZANidine (ZANAFLEX) 2 mg tablet, Take 1-2 tabs qhs, Disp: 60 tablet, Rfl: 2  •  tretinoin (RETIN-A) 0 025 % cream, Apply thin film topically once daily, Disp: 45 g, Rfl: 3    Current Facility-Administered Medications:   •  bupivacaine (PF) (MARCAINE) 0 25 % injection 30 mL, 30 mL, Intra-articular, Once, Greg Mcburney, DO  •  iohexol (OMNIPAQUE) 300 mg/mL injection 50 mL, 50 mL, Intra-articular, Once, Rober Pineda DO  •  lidocaine (PF) (XYLOCAINE-MPF) 1 % injection 5 mL, 5 mL, Other, Once, Rober Pineda DO  •  methylPREDNISolone acetate (DEPO-MEDROL) injection 40 mg, 40 mg, Intra-articular, Once, Greg Mcburney, DO    No Known Allergies    Physical Exam:   Vitals:    12/14/22 1441   BP: 133/88   Pulse: 92   Resp: 20   Temp: 98 °F (36 7 °C)   SpO2: 98%     General: Awake, Alert, Oriented x 3, Mood and affect appropriate  Respiratory: Respirations even and unlabored  Cardiovascular: Peripheral pulses intact; no edema  Musculoskeletal Exam: Antalgic gait    ASA Score: 2    Patient/Chart Verification  Patient ID Verified: Verbal  ID Band Applied: No  Consents Confirmed: Procedural  H&P( within 30 days) Verified: To be obtained in the Pre-Procedure area  Interval H&P(within 24 hr) Complete (required for Outpatients and Surgery Admit only): To be obtained in the Pre-Procedure area  Allergies Reviewed: No  Anticoag/NSAID held?: NA  Currently on antibiotics?: No  Pre-op Lab/Test Results Available: N/A  Pregnancy Lab Collected: N/A comment    Assessment:   1   Left hip pain        Plan: left intra-articular hip injection

## 2022-12-14 NOTE — DISCHARGE INSTRUCTIONS
Steroid Joint Injection   WHAT YOU NEED TO KNOW:   A steroid joint injection is a procedure to inject steroid medicine into a joint  Steroid medicine decreases pain and inflammation  The injection may also contain an anesthetic (numbing medicine) to decrease pain  It may be done to treat conditions such as arthritis, gout, or carpal tunnel syndrome  The injections may be given in your knee, ankle, shoulder, elbow, wrist, ankle or sacroiliac joint  Do not apply heat to any area that is numb  If you have discomfort or soreness at the injection site, you may apply ice today, 20 minutes on and 20 minutes off  Tomorrow you may use ice or warm, moist heat  Do not apply ice or heat directly to the skin  You may have an increase or change in the discomfort for 36-48 hours after your treatment  Apply ice and continue with any pain medicine you have been prescribed  Do not do anything strenuous today  You may shower, but no tub baths or hot tubs today  You may resume your normal activities tomorrow, but do not “overdo it”  Resume normal activities slowly when you are feeling better  If you experience redness, drainage or swelling at the injection site, or if you develop a fever above 100 degrees, please call [facility] at [phone] or go to the Emergency Room  Continue to take all routine medicines prescribed by your primary care physician unless otherwise instructed by our staff  Most blood thinners should be started again according to your regularly scheduled dosing  If you have any questions, please give our office a call  As no general anesthesia was used in today's procedure, you should not experience any side effects related to anesthesia  If you are diabetic, the steroids used in today's injection may temporarily increase your blood sugar levels after the first few days after your injection   Please keep a close eye on your sugars and alert the doctor who manages your diabetes if your sugars are significantly high from your baseline or you are symptomatic  If you have a problem specifically related to your procedure, please call our office at [phone]  Problems not related to your procedure should be directed to your primary care physician

## 2022-12-21 ENCOUNTER — TELEPHONE (OUTPATIENT)
Dept: PAIN MEDICINE | Facility: CLINIC | Age: 57
End: 2022-12-21

## 2022-12-21 DIAGNOSIS — L70.9 ACNE, UNSPECIFIED ACNE TYPE: ICD-10-CM

## 2022-12-21 NOTE — TELEPHONE ENCOUNTER
Attempted contact w/ pt  Pretty Schaefer w/ c/b #, [place] and loc      LOV 11/18/22 NILSA  INJ LT HIP IA 12/14/22 JW

## 2022-12-21 NOTE — TELEPHONE ENCOUNTER
Caller: Samaria Parekh    Doctor/Office: Miriam    Call regarding :  nurse     Call was transferred to: nurse

## 2022-12-21 NOTE — TELEPHONE ENCOUNTER
S/w pt who states having 45% improv post Lt IA Hip inj on 12/14/22  Pt states she had a bad day yesterday 12/20/22 and was able to control w/ Tylenol, NSAID and TENS device  Pt states today pain has decrs and is tolerable  RN advised pt it can take the full 14 days to see full efficacy of inj  Pt asking if NILSA would be agreeable to writing script for her own TENS device? Pls advise  Thank you!

## 2022-12-21 NOTE — TELEPHONE ENCOUNTER
Caller: patient   Doctor/office: Alina Wilhelm  CB#: [phone]      % of improvement: 45% (Patient states her pain returned yesterday & she took Naproxen & used her fiance's Tens Unit & she felt better) She would like to discuss her status & options for a Tens Unit    Pain Scale (1-10): 4/10

## 2022-12-29 ENCOUNTER — TELEPHONE (OUTPATIENT)
Dept: OBGYN CLINIC | Facility: CLINIC | Age: 57
End: 2022-12-29

## 2023-01-01 NOTE — TELEPHONE ENCOUNTER
Called pt x2, NA.unable to LM (voice mailbox not set up)   It doesn't look like I was involved in the 2/17 call - it was sent to [facility] and he advised a visit  I don't know if she needs another EMB until we get more info  If she is not able to come for an appt because she is not feeling well she can follow up with us when she is able to    Not sure what cream she is referring to - our practice has not prescribed anything     Please inquire as to what kind of surgery she had and where this was done   Please also inquire as to whether or not she recalls who she has been playing phone tag with - looks like Reed Ponce last spoke to her, maybe she recalls?

## 2023-01-13 ENCOUNTER — OFFICE VISIT (OUTPATIENT)
Dept: PAIN MEDICINE | Facility: CLINIC | Age: 58
End: 2023-01-13

## 2023-01-13 VITALS — BODY MASS INDEX: 26.63 KG/M2 | HEIGHT: 66 IN

## 2023-01-13 DIAGNOSIS — M25.552 LEFT HIP PAIN: Primary | ICD-10-CM

## 2023-01-13 DIAGNOSIS — M54.16 LUMBAR RADICULOPATHY: ICD-10-CM

## 2023-01-13 DIAGNOSIS — M47.816 LUMBAR SPONDYLOSIS: ICD-10-CM

## 2023-01-13 DIAGNOSIS — M48.061 SPINAL STENOSIS OF LUMBAR REGION, UNSPECIFIED WHETHER NEUROGENIC CLAUDICATION PRESENT: ICD-10-CM

## 2023-01-13 NOTE — PROGRESS NOTES
Assessment:  1  Left hip pain    2  Lumbar radiculopathy    3  Lumbar spondylosis    4  Spinal stenosis of lumbar region, unspecified whether neurogenic claudication present        Plan:  1  Can repeat left intra-articular hip injection as needed  I discussed with the patient that since there has been moderate to significant improvement in the pain symptoms, we will hold off on any repeat injections at this point in time  However, I reviewed with the patient that if their symptoms should return or worsen,  they should call our office to schedule to discuss repeating the injection  2   Patient may continue ibuprofen and Tylenol as needed  3  Continue with home exercise program as taught by physical therapy  4  Follow-up on a as needed basis at this time    History of Present Illness: The patient is a 61 y o  female last seen on 11/18/22 who presents for a follow up office visit in regards to chronic low back pain that radiates into the left groin and intermittently into the lateral aspect of the left lower extremity  The patient denies right lower extremity symptoms, bowel or bladder incontinence or saddle anesthesia  Patient is status post left intra-articular hip injection on December 14, 2022 and continues with an ongoing 70% improvement of her groin pain  She also feels like her radicular complaints in her lower extremity are essentially at baseline  She occasionally takes ibuprofen as needed  She has not needed tizanidine or gabapentin  She also finds significant improvement with the TENS unit  MRI of the lumbar spine November 12, 2022 shows disc bulging from L2-3 to L4-5 and facet arthrosis  Mild central and foraminal stenosis at from L2-3 to L4-5  Possible L4 encroachment bilaterally at L3-4  The patient rates her pain a 4 out of 10 on the numeric pain rating scale    She intermittently has pain in the evening which is described as burning, dull aching and pressure-like    I have personally reviewed and/or updated the patient's past medical history, past surgical history, family history, social history, current medications, allergies, and vital signs today  Review of Systems:    Review of Systems   Respiratory: Negative for shortness of breath  Cardiovascular: Negative for chest pain  Gastrointestinal: Negative for constipation, diarrhea, nausea and vomiting  Musculoskeletal: Positive for gait problem  Negative for arthralgias, joint swelling and myalgias  Skin: Negative for rash  Neurological: Negative for dizziness, seizures and weakness  All other systems reviewed and are negative          Past Medical History:   Diagnosis Date   • Lyme disease    • Small bowel obstruction (HCC)        Past Surgical History:   Procedure Laterality Date   • COLONOSCOPY  2013   • DILATION AND CURETTAGE OF UTERUS     • OVARIAN CYST REMOVAL  1995   • NC LAPS ABD PRTM&OMENTUM DX W/WO SPEC BR/WA SPX N/A 2/21/2021    Procedure: LAPAROSCOPY DIAGNOSTIC, exploratory laparotomy, lysis of adhesions, REDUCTION OF INTERNAL HERNIA;  Surgeon: Tristen Diallo MD;  Location: [address] [place] [address];  Service: General       Family History   Problem Relation Age of Onset   • Atrial fibrillation Mother    • Hyperlipidemia Mother    • Heart disease Father    • Hypertension Father    • Hyperlipidemia Father    • Diabetes Father    • Prostate cancer Maternal Grandfather 48   • Prostate cancer Maternal Uncle 61   • Breast cancer Paternal Aunt 31       Social History     Occupational History   • Not on file   Tobacco Use   • Smoking status: Never   • Smokeless tobacco: Never   Vaping Use   • Vaping Use: Never used   Substance and Sexual Activity   • Alcohol use: Yes     Comment: occasional   • Drug use: No   • Sexual activity: Yes     Partners: Male     Birth control/protection: Post-menopausal         Current Outpatient Medications:   •  Calcium Carb-Cholecalciferol 1000-800 MG-UNIT TABS, Take by mouth daily  , Disp: , Rfl:   • gabapentin (NEURONTIN) 100 mg capsule, Take 1 capsule at bedtime for 2 days then increase dose to 2 capsules at bedtime for 2 days, then increase dose up to 3 capsules at bedtime if needed, Disp: 90 capsule, Rfl: 1  •  KRILL OIL PO, Take 1 capsule by mouth daily, Disp: , Rfl:   •  LORazepam (ATIVAN) 0 5 mg tablet, Take 1 tablet (0 5 mg total) by mouth as needed for anxiety (Launch) Take as need for dental work, Disp: 30 tablet, Rfl: 0  •  Multiple Vitamin (MULTI-VITAMIN DAILY) TABS, Take 1 tablet by mouth daily, Disp: , Rfl:   •  NON FORMULARY, Take 1 each by mouth daily NUTRIM OAT POWDER-SPRINKLE OVER FOOD ONCE A DAY, Disp: , Rfl:   •  pramipexole (MIRAPEX) 0 25 mg tablet, Take 1 tablet once a day after dinner, Disp: 90 tablet, Rfl: 3  •  Sodium Fluoride 5000 PPM 1 1 % PSTE, , Disp: , Rfl:   •  tiZANidine (ZANAFLEX) 2 mg tablet, Take 1-2 tabs qhs, Disp: 60 tablet, Rfl: 2  •  tretinoin (RETIN-A) 0 025 % cream, Apply thin film topically once daily, Disp: 45 g, Rfl: 3  •  Iron-Vitamin C 100-250 MG TABS, Take by mouth once a week   (Patient not taking: Reported on 9/1/2022), Disp: , Rfl:     No Known Allergies    Physical Exam:    Ht 5' 6" (1 676 m)   LMP 06/22/2016   BMI 26 63 kg/m²     Constitutional:normal, well developed, well nourished, alert, in no distress and non-toxic and no overt pain behavior  Eyes:anicteric  HEENT:grossly intact  Neck:supple, symmetric, trachea midline and no masses   Pulmonary:even and unlabored  Cardiovascular:No edema or pitting edema present  Skin:Normal without rashes or lesions and well hydrated  Psychiatric:Mood and affect appropriate  Neurologic:Cranial Nerves II-XII grossly intact  Musculoskeletal:normal gait    Imaging  No orders to display         No orders of the defined types were placed in this encounter

## 2023-04-25 ENCOUNTER — TELEPHONE (OUTPATIENT)
Dept: PAIN MEDICINE | Facility: CLINIC | Age: 58
End: 2023-04-25

## 2023-04-25 NOTE — TELEPHONE ENCOUNTER
Caller: Carlos Reza PT    Doctor: Dr Cervantes He    Reason for call: Pt would like to schedule a hip injection   The pt is looking to schedule on May 4    Call back#: [phone]

## 2023-04-26 NOTE — TELEPHONE ENCOUNTER
Caller: maurilio conti    Doctor: jalyn    Reason for call: pt giving you a wojciech    Call back#: [phone]

## 2023-04-26 NOTE — TELEPHONE ENCOUNTER
Caller: patient    Doctor: Debra Hannon    Reason for call: returning missed call, may 4 patient is off    Call back#:

## 2023-04-27 ENCOUNTER — RA CDI HCC (OUTPATIENT)
Dept: OTHER | Facility: HOSPITAL | Age: 58
End: 2023-04-27

## 2023-04-27 DIAGNOSIS — M25.552 LEFT HIP PAIN: Primary | ICD-10-CM

## 2023-04-27 NOTE — PROGRESS NOTES
[facility]  coding opportunities       Chart reviewed, no opportunity found: CHART REVIEWED, NO OPPORTUNITY FOUND      This is a reminder to address ALL HCC (risk adjustment) codes as found on active problem list for 2023 as patient scores reset to zero JEREMÍAS    Patients Insurance        Commercial Insurance: [address] [place] [address]

## 2023-05-01 ENCOUNTER — TELEPHONE (OUTPATIENT)
Dept: PODIATRY | Facility: CLINIC | Age: 58
End: 2023-05-01

## 2023-05-01 NOTE — TELEPHONE ENCOUNTER
Caller: Ave Cruz    Doctor/Office: Dr Graciela Joy    #: [phone]    Escalation: Care/Saw Dr COOPER 2/25/2021 and had ingrown nail removed/asking if office can fax the note to her new DR who will see her sooner for the nail issue  Dr Roy Never # [phone]  Please call pt back after doing this as she wants him to know what Dr COOPER did last procedure  Thanks

## 2023-05-04 ENCOUNTER — OFFICE VISIT (OUTPATIENT)
Dept: FAMILY MEDICINE CLINIC | Facility: CLINIC | Age: 58
End: 2023-05-04

## 2023-05-04 ENCOUNTER — TELEPHONE (OUTPATIENT)
Dept: OBGYN CLINIC | Facility: HOSPITAL | Age: 58
End: 2023-05-04

## 2023-05-04 VITALS
RESPIRATION RATE: 16 BRPM | HEIGHT: 66 IN | OXYGEN SATURATION: 98 % | HEART RATE: 92 BPM | BODY MASS INDEX: 28.38 KG/M2 | DIASTOLIC BLOOD PRESSURE: 72 MMHG | SYSTOLIC BLOOD PRESSURE: 129 MMHG | TEMPERATURE: 98.2 F | WEIGHT: 176.6 LBS

## 2023-05-04 DIAGNOSIS — G25.81 RESTLESS LEG SYNDROME: ICD-10-CM

## 2023-05-04 DIAGNOSIS — E78.49 OTHER HYPERLIPIDEMIA: ICD-10-CM

## 2023-05-04 DIAGNOSIS — M54.42 CHRONIC BILATERAL LOW BACK PAIN WITH BILATERAL SCIATICA: ICD-10-CM

## 2023-05-04 DIAGNOSIS — Z12.31 SCREENING MAMMOGRAM FOR BREAST CANCER: ICD-10-CM

## 2023-05-04 DIAGNOSIS — Z00.00 ENCOUNTER FOR WELLNESS EXAMINATION IN ADULT: Primary | ICD-10-CM

## 2023-05-04 DIAGNOSIS — M54.41 CHRONIC BILATERAL LOW BACK PAIN WITH BILATERAL SCIATICA: ICD-10-CM

## 2023-05-04 DIAGNOSIS — G89.29 CHRONIC BILATERAL LOW BACK PAIN WITH BILATERAL SCIATICA: ICD-10-CM

## 2023-05-04 DIAGNOSIS — M16.12 PRIMARY OSTEOARTHRITIS OF LEFT HIP: ICD-10-CM

## 2023-05-04 PROBLEM — M16.0 PRIMARY OSTEOARTHRITIS OF BOTH HIPS: Status: ACTIVE | Noted: 2022-11-18

## 2023-05-04 PROBLEM — R63.5 WEIGHT GAIN: Status: RESOLVED | Noted: 2020-01-24 | Resolved: 2023-05-04

## 2023-05-04 PROBLEM — K56.609 SMALL BOWEL OBSTRUCTION (HCC): Status: RESOLVED | Noted: 2021-02-21 | Resolved: 2023-05-04

## 2023-05-04 RX ORDER — PRAMIPEXOLE DIHYDROCHLORIDE 0.25 MG/1
TABLET ORAL
Qty: 90 TABLET | Refills: 3 | Status: SHIPPED | OUTPATIENT
Start: 2023-05-04

## 2023-05-04 RX ORDER — CELECOXIB 200 MG/1
200 CAPSULE ORAL DAILY PRN
Qty: 30 CAPSULE | Refills: 1 | Status: SHIPPED | OUTPATIENT
Start: 2023-05-04

## 2023-05-04 NOTE — ASSESSMENT & PLAN NOTE
Overall improved with combination of gabapentin and Mirapex  Dose of Mirapex could be increased to twice a day

## 2023-05-04 NOTE — TELEPHONE ENCOUNTER
Caller: Patient    Doctor: Krystal Silverio    Reason for call: Calling for Dr Ying Stuart availability       Call back#: [phone]

## 2023-05-04 NOTE — ASSESSMENT & PLAN NOTE
spine and pain center follows  Left leg paresthesia, could be component of meralgia paresthetica versus radiculopathy, would not affect our treatment strategy    Continue onPRN Tizanidine and Gabapentin

## 2023-05-04 NOTE — PROGRESS NOTES
Name: Yoly Quezada      : 1965      MRN: [id number]  Encounter Provider: David Frankel MD  Encounter Date: 2023   Encounter department: [address] [place] [address]     1  Encounter for wellness examination in adult  Comments:  Proceed with mammogram, GYN care in [place]  Patient is up-to-date with colonoscopy    2  Screening mammogram for breast cancer  -     Mammo screening bilateral w 3d & cad; Future    3  Primary osteoarthritis of left hip  Assessment & Plan:  Pending injection by spine and pain center  Referral for ortho evaluation  No relief with Aleve, trial of Celebrex      Orders:  -     celecoxib (CeleBREX) 200 mg capsule; Take 1 capsule (200 mg total) by mouth daily as needed for moderate pain  -     Ambulatory referral to Orthopedic Surgery; Future    4  Restless leg syndrome  Assessment & Plan:  Overall improved with combination of gabapentin and Mirapex  Dose of Mirapex could be increased to twice a day  Orders:  -     pramipexole (MIRAPEX) 0 25 mg tablet; Take 1 tablet once a day after dinner  -     Vitamin D 25 hydroxy; Future    5  Chronic bilateral low back pain with bilateral sciatica  Assessment & Plan:   spine and pain center follows  Left leg paresthesia, could be component of meralgia paresthetica versus radiculopathy, would not affect our treatment strategy  Continue onPRN Tizanidine and Gabapentin      6  Other hyperlipidemia  -     CBC and differential; Future  -     Comprehensive metabolic panel; Future  -     Lipid Panel with Direct LDL reflex; Future  -     TSH, 3rd generation; Future      Annual well exam   Assessment and plan as outlined above  Patient reports chronic shortness of breath with hills, but no chest pain, palpitations or dizziness  She has been experiencing recurrent arthritic pain in her back and currently in her left hip    She is scheduled for reevaluation with spine and pain center and we will proceed with consult by [facility] orthopedic surgery  Once arthritic pain is under control and if shortness of breath persists - patient will contact me and we will pursue further work-up  We discussed shingles vaccination  Patient is up-to-date with health screenings  Routine blood work ordered  Depression Screening and Follow-up Plan: Patient was screened for depression during today's encounter  They screened negative with a PHQ-2 score of 0  Subjective     Annual wellexam  Patient has been experiencing persistent left hip pain  Back pain has been well controlled  She has been off gabapentin for quite a while  Patient is complaining of intermittent numbness of left anterior and inner thigh and is concerned about meralgia paresthetica  Patient is scheduled for hip injection with [facility] spine [facility], Dr Nga Oakley  Patient is up-to-date with GYN care at [facility]  Mammogram is scheduled  She reports some shortness of breath with walking up the hills, no chest pain, palpitations, shortness of breath or dizziness  Patient admits to certain degree of deconditioning as she was not able to exercise due to ongoing arthritic pain in her hip and back  Hip Pain     Back Pain      Review of Systems   Constitutional: Negative  HENT: Negative  Eyes: Negative  Respiratory: Negative  Cardiovascular: Negative  Gastrointestinal: Negative  Genitourinary: Negative  Musculoskeletal: Positive for arthralgias and back pain  Left hip pain   Allergic/Immunologic: Negative  Neurological: Negative  Hematological: Negative          Past Medical History:   Diagnosis Date   • Lyme disease    • Small bowel obstruction [facility])      Past Surgical History:   Procedure Laterality Date   • COLONOSCOPY  2013   • DILATION AND CURETTAGE OF UTERUS     • OVARIAN CYST REMOVAL  1995   • [place] LAPS ABD PRTM&OMENTUM DX W/WO SPEC BR/WA SPX N/A 2/21/2021    Procedure: [facility], exploratory laparotomy, lysis of adhesions, REDUCTION OF INTERNAL HERNIA;  Surgeon: Willam Wolfe MD;  Location: [address] [place] [address];  Service: General     Family History   Problem Relation Age of Onset   • Atrial fibrillation Mother    • Hyperlipidemia Mother    • Cancer Mother    • Heart disease Father    • Hypertension Father    • Hyperlipidemia Father    • Diabetes Father    • Prostate cancer Maternal Grandfather         Nikolas Monsivais   • Prostate cancer Maternal Uncle 61   • Breast cancer Paternal Aunt 31   • Diabetes Brother      Social History     Socioeconomic History   • Marital status: /Civil Union     Spouse name: None   • Number of children: None   • Years of education: None   • Highest education level: None   Occupational History   • None   Tobacco Use   • Smoking status: Never   • Smokeless tobacco: Never   Vaping Use   • Vaping Use: Never used   Substance and Sexual Activity   • Alcohol use:  Yes     Alcohol/week: 2 0 standard drinks     Types: 2 Cans of beer per week     Comment: occasional   • Drug use: Never   • Sexual activity: Yes     Partners: Male     Birth control/protection: Post-menopausal   Other Topics Concern   • None   Social History Narrative   • None     Social Determinants of Health     Financial Resource Strain: Not on file   Food Insecurity: Not on file   Transportation Needs: Not on file   Physical Activity: Not on file   Stress: Not on file   Social Connections: Not on file   Intimate Partner Violence: Not on file   Housing Stability: Not on file     Current Outpatient Medications on File Prior to Visit   Medication Sig   • Calcium Carb-Cholecalciferol 1000-800 MG-UNIT TABS Take by mouth daily     • gabapentin (NEURONTIN) 100 mg capsule Take 1 capsule at bedtime for 2 days then increase dose to 2 capsules at bedtime for 2 days, then increase dose up to 3 capsules at bedtime if needed   • Iron-Vitamin C 100-250 MG TABS Take by mouth 3 (three) times a week   • KRILL OIL "PO Take 1 capsule by mouth daily   • LORazepam (ATIVAN) 0 5 mg tablet Take 1 tablet (0 5 mg total) by mouth as needed for anxiety (Launch) Take as need for dental work   • Multiple Vitamin (MULTI-VITAMIN DAILY) TABS Take 1 tablet by mouth daily   • NON FORMULARY Take 1 each by mouth daily NUTRIM OAT POWDER-SPRINKLE OVER FOOD ONCE A DAY   • Sodium Fluoride 5000 PPM 1 1 % PSTE    • tiZANidine (ZANAFLEX) 2 mg tablet Take 1-2 tabs qhs   • tretinoin (RETIN-A) 0 025 % cream Apply thin film topically once daily     Allergies   Allergen Reactions   • Codeine Dizziness     Immunization History   Administered Date(s) Administered   • COVID-19 MODERNA VACC 0 25 ML IM BOOSTER 12/03/2021   • COVID-19 MODERNA VACC 0 5 ML IM 12/30/2020, 01/26/2021   • INFLUENZA 10/11/2020   • Tdap 06/16/2017, 07/01/2021       Objective     /72 (BP Location: Left arm, Patient Position: Sitting, Cuff Size: Large)   Pulse 92   Temp 98 2 °F (36 8 °C) (Temporal)   Resp 16   Ht 5' 6\" (1 676 m)   Wt 80 1 kg (176 lb 9 6 oz)   LMP 06/22/2016   SpO2 98%   BMI 28 50 kg/m²     Physical Exam  Vitals and nursing note reviewed  Constitutional:       General: She is not in acute distress  Appearance: Normal appearance  She is well-developed  She is not ill-appearing  HENT:      Head: Normocephalic and atraumatic  Eyes:      Conjunctiva/sclera: Conjunctivae normal    Neck:      Thyroid: No thyromegaly  Vascular: No carotid bruit  Cardiovascular:      Rate and Rhythm: Normal rate and regular rhythm  Heart sounds: Normal heart sounds  No murmur heard  Pulmonary:      Effort: Pulmonary effort is normal  No respiratory distress  Breath sounds: Normal breath sounds  No wheezing  Abdominal:      General: Bowel sounds are normal  There is no distension or abdominal bruit  Hernia: No hernia is present  Musculoskeletal:         General: Normal range of motion  Cervical back: Neck supple  No rigidity        Right lower " leg: No edema  Left lower leg: No edema  Comments: Reproducible tenderness left hip to the left groin   Skin:     General: Skin is warm  Neurological:      Mental Status: She is alert and oriented to person, place, and time  Cranial Nerves: No cranial nerve deficit  Coordination: Coordination normal    Psychiatric:         Mood and Affect: Mood normal          Behavior: Behavior normal          Thought Content:  Thought content normal        Kathya Otoole MD

## 2023-05-04 NOTE — ASSESSMENT & PLAN NOTE
Pending injection by spine and pain center    Referral for ortho evaluation  No relief with Aleve, trial of Celebrex

## 2023-05-24 ENCOUNTER — TELEPHONE (OUTPATIENT)
Dept: PAIN MEDICINE | Facility: CLINIC | Age: 58
End: 2023-05-24

## 2023-05-24 NOTE — TELEPHONE ENCOUNTER
Caller: Cliff Greenberg    Doctor: Onel Hunter    Reason for call: patient had procedure done this Monday with [address] with Dr Stephanie Vivar left hip injection    Calling to cancel procedure with Dr Onel Hunter 5/30     Call back#: [phone]

## 2023-05-25 DIAGNOSIS — G25.81 RESTLESS LEG SYNDROME: ICD-10-CM

## 2023-05-25 RX ORDER — PRAMIPEXOLE DIHYDROCHLORIDE 0.25 MG/1
0.25 TABLET ORAL
Qty: 30 TABLET | Refills: 0 | Status: SHIPPED | OUTPATIENT
Start: 2023-05-25

## 2023-05-25 RX ORDER — PRAMIPEXOLE DIHYDROCHLORIDE 0.25 MG/1
TABLET ORAL
Qty: 90 TABLET | Refills: 3 | Status: SHIPPED | OUTPATIENT
Start: 2023-05-25

## 2023-06-25 ENCOUNTER — HOSPITAL ENCOUNTER (OUTPATIENT)
Dept: RADIOLOGY | Facility: HOSPITAL | Age: 58
Discharge: HOME | End: 2023-06-25
Payer: COMMERCIAL

## 2023-06-25 DIAGNOSIS — Z12.31 SCREENING MAMMOGRAM FOR BREAST CANCER: ICD-10-CM

## 2023-06-25 PROCEDURE — 77067 SCR MAMMO BI INCL CAD: CPT

## 2023-06-25 PROCEDURE — 77063 BREAST TOMOSYNTHESIS BI: CPT

## 2023-06-28 DIAGNOSIS — M16.12 PRIMARY OSTEOARTHRITIS OF LEFT HIP: ICD-10-CM

## 2023-06-28 DIAGNOSIS — M54.16 LUMBAR RADICULOPATHY: ICD-10-CM

## 2023-06-28 RX ORDER — CELECOXIB 200 MG/1
200 CAPSULE ORAL DAILY PRN
Qty: 90 CAPSULE | Refills: 3 | Status: SHIPPED | OUTPATIENT
Start: 2023-06-28

## 2023-06-28 RX ORDER — CELECOXIB 200 MG/1
200 CAPSULE ORAL DAILY PRN
Qty: 30 CAPSULE | Refills: 1 | Status: SHIPPED | OUTPATIENT
Start: 2023-06-28

## 2023-06-28 RX ORDER — GABAPENTIN 300 MG/1
CAPSULE ORAL
Qty: 90 CAPSULE | Refills: 3 | Status: SHIPPED | OUTPATIENT
Start: 2023-06-28

## 2023-06-30 DIAGNOSIS — M16.12 PRIMARY OSTEOARTHRITIS OF LEFT HIP: ICD-10-CM

## 2023-06-30 RX ORDER — CELECOXIB 200 MG/1
CAPSULE ORAL
Qty: 30 CAPSULE | Refills: 0 | Status: SHIPPED | OUTPATIENT
Start: 2023-06-30

## 2023-07-15 ENCOUNTER — APPOINTMENT (OUTPATIENT)
Dept: LAB | Facility: HOSPITAL | Age: 58
End: 2023-07-15
Payer: COMMERCIAL

## 2023-07-15 DIAGNOSIS — E78.49 OTHER HYPERLIPIDEMIA: ICD-10-CM

## 2023-07-15 DIAGNOSIS — G25.81 RESTLESS LEG SYNDROME: ICD-10-CM

## 2023-07-15 LAB
25(OH)D3 SERPL-MCNC: 41.7 NG/ML (ref 30–100)
ALBUMIN SERPL BCP-MCNC: 4.1 G/DL (ref 3.5–5)
ALP SERPL-CCNC: 62 U/L (ref 34–104)
ALT SERPL W P-5'-P-CCNC: 34 U/L (ref 7–52)
ANION GAP SERPL CALCULATED.3IONS-SCNC: 5 MMOL/L
AST SERPL W P-5'-P-CCNC: 19 U/L (ref 13–39)
BASOPHILS # BLD AUTO: 0.07 THOUSANDS/ÂΜL (ref 0–0.1)
BASOPHILS NFR BLD AUTO: 1 % (ref 0–1)
BILIRUB SERPL-MCNC: 0.48 MG/DL (ref 0.2–1)
BUN SERPL-MCNC: 14 MG/DL (ref 5–25)
CALCIUM SERPL-MCNC: 9.1 MG/DL (ref 8.4–10.2)
CHLORIDE SERPL-SCNC: 106 MMOL/L (ref 96–108)
CHOLEST SERPL-MCNC: 203 MG/DL
CO2 SERPL-SCNC: 27 MMOL/L (ref 21–32)
CREAT SERPL-MCNC: 0.57 MG/DL (ref 0.6–1.3)
EOSINOPHIL # BLD AUTO: 0.26 THOUSAND/ÂΜL (ref 0–0.61)
EOSINOPHIL NFR BLD AUTO: 5 % (ref 0–6)
ERYTHROCYTE [DISTWIDTH] IN BLOOD BY AUTOMATED COUNT: 13.6 % (ref 11.6–15.1)
GFR SERPL CREATININE-BSD FRML MDRD: 102 ML/MIN/1.73SQ M
GLUCOSE P FAST SERPL-MCNC: 89 MG/DL (ref 65–99)
HCT VFR BLD AUTO: 41.8 % (ref 34.8–46.1)
HDLC SERPL-MCNC: 51 MG/DL
HGB BLD-MCNC: 13.4 G/DL (ref 11.5–15.4)
IMM GRANULOCYTES # BLD AUTO: 0.05 THOUSAND/UL (ref 0–0.2)
IMM GRANULOCYTES NFR BLD AUTO: 1 % (ref 0–2)
LDLC SERPL CALC-MCNC: 124 MG/DL (ref 0–100)
LYMPHOCYTES # BLD AUTO: 1.92 THOUSANDS/ÂΜL (ref 0.6–4.47)
LYMPHOCYTES NFR BLD AUTO: 35 % (ref 14–44)
MCH RBC QN AUTO: 29 PG (ref 26.8–34.3)
MCHC RBC AUTO-ENTMCNC: 32.1 G/DL (ref 31.4–37.4)
MCV RBC AUTO: 91 FL (ref 82–98)
MONOCYTES # BLD AUTO: 0.53 THOUSAND/ÂΜL (ref 0.17–1.22)
MONOCYTES NFR BLD AUTO: 10 % (ref 4–12)
NEUTROPHILS # BLD AUTO: 2.72 THOUSANDS/ÂΜL (ref 1.85–7.62)
NEUTS SEG NFR BLD AUTO: 48 % (ref 43–75)
NRBC BLD AUTO-RTO: 0 /100 WBCS
PLATELET # BLD AUTO: 304 THOUSANDS/UL (ref 149–390)
PMV BLD AUTO: 9.3 FL (ref 8.9–12.7)
POTASSIUM SERPL-SCNC: 4.1 MMOL/L (ref 3.5–5.3)
PROT SERPL-MCNC: 6.8 G/DL (ref 6.4–8.4)
RBC # BLD AUTO: 4.62 MILLION/UL (ref 3.81–5.12)
SODIUM SERPL-SCNC: 138 MMOL/L (ref 135–147)
TRIGL SERPL-MCNC: 138 MG/DL
TSH SERPL DL<=0.05 MIU/L-ACNC: 2.55 UIU/ML (ref 0.45–4.5)
WBC # BLD AUTO: 5.55 THOUSAND/UL (ref 4.31–10.16)

## 2023-07-15 PROCEDURE — 82306 VITAMIN D 25 HYDROXY: CPT

## 2023-07-15 PROCEDURE — 80061 LIPID PANEL: CPT

## 2023-07-15 PROCEDURE — 85025 COMPLETE CBC W/AUTO DIFF WBC: CPT

## 2023-07-15 PROCEDURE — 84443 ASSAY THYROID STIM HORMONE: CPT

## 2023-07-15 PROCEDURE — 80053 COMPREHEN METABOLIC PANEL: CPT

## 2023-07-15 PROCEDURE — 36415 COLL VENOUS BLD VENIPUNCTURE: CPT

## 2023-10-05 ENCOUNTER — OFFICE VISIT (OUTPATIENT)
Dept: FAMILY MEDICINE CLINIC | Facility: CLINIC | Age: 58
End: 2023-10-05
Payer: COMMERCIAL

## 2023-10-05 VITALS
TEMPERATURE: 98 F | BODY MASS INDEX: 29.41 KG/M2 | HEIGHT: 66 IN | SYSTOLIC BLOOD PRESSURE: 130 MMHG | RESPIRATION RATE: 16 BRPM | DIASTOLIC BLOOD PRESSURE: 88 MMHG | HEART RATE: 90 BPM | WEIGHT: 183 LBS | OXYGEN SATURATION: 96 %

## 2023-10-05 DIAGNOSIS — J01.00 ACUTE NON-RECURRENT MAXILLARY SINUSITIS: Primary | ICD-10-CM

## 2023-10-05 PROCEDURE — 99213 OFFICE O/P EST LOW 20 MIN: CPT | Performed by: FAMILY MEDICINE

## 2023-10-05 RX ORDER — AMOXICILLIN AND CLAVULANATE POTASSIUM 875; 125 MG/1; MG/1
1 TABLET, FILM COATED ORAL
Qty: 20 TABLET | Refills: 0 | Status: SHIPPED | OUTPATIENT
Start: 2023-10-05 | End: 2023-10-15

## 2023-10-05 RX ORDER — METHYLPREDNISOLONE 4 MG/1
TABLET ORAL
Qty: 21 EACH | Refills: 0 | Status: SHIPPED | OUTPATIENT
Start: 2023-10-05

## 2023-10-05 NOTE — PATIENT INSTRUCTIONS
Generic mucinex PLAIN (guaifenesin)  Start antibiotic tonight, start steroid pack tomorrow  Start Flonase 2 sprays each nostril every night
106

## 2023-10-05 NOTE — PROGRESS NOTES
Name: Randalyn Hodgkins      : 1965      MRN: [id number]  Encounter Provider: Marilyn Cazares MD  Encounter Date: 10/5/2023   Encounter department: [address] [place] [address]     1. Acute non-recurrent maxillary sinusitis  -     amoxicillin-clavulanate (AUGMENTIN) 875-125 mg per tablet; Take 1 tablet by mouth 2 (two) times daily after meals for 10 days  -     methylPREDNISolone 4 MG tablet therapy pack; Use as directed on package      Patient Instructions   Generic mucinex PLAIN (guaifenesin)  Start antibiotic tonight, start steroid pack tomorrow  Start Flonase 2 sprays each nostril every night  Patient presents with symptoms of eustachian tube dysfunction due to acute sinusitis. I advised her to start regimen of Medrol Dosepak, Augmentin, Flonase and Mucinex. Patient will follow-up with ENT for long-term surveillance of tinnitus and hearing screening. Subjective     On/off tinnitus since summer ,patient has attributed to being in the pool- but ear drops did not help. Patient reports that recently she developed increased tinnitus and "cracking" noise in her right ear. Dizziness is rare. Occasionally tinnitus  Is present when she wakes  up at night. Recently she has been experiencing sinus pressure described as primarily retro-orbital pressure. Maxillary tenderness, toothache. The patient is up-to-date with a dentist.  She reports previous ENT evaluation with diagnosis of mild hearing loss which has not been bothersome for her. Recent hip and low back injections which has been extremely helpful and pain has significantly improved. Review of Systems   Constitutional: Negative. HENT: Positive for congestion, ear pain (as per HPI), postnasal drip and sinus pain. Eyes: Negative. Respiratory: Negative. Cardiovascular: Negative. Neurological: Positive for dizziness (rare). Negative for light-headedness and headaches.        Past Medical History: Diagnosis Date   • Lyme disease    • Small bowel obstruction ([address])      Past Surgical History:   Procedure Laterality Date   • COLONOSCOPY  2013   • DILATION AND CURETTAGE OF UTERUS     • OVARIAN CYST REMOVAL  1995   • [place] LAPS ABD PRTM&OMENTUM DX W/WO SPEC BR/WA SPX N/A 2/21/2021    Procedure: LAPAROSCOPY DIAGNOSTIC, exploratory laparotomy, lysis of adhesions, REDUCTION OF INTERNAL HERNIA;  Surgeon: Ashanti Hannah MD;  Location: [facility];  Service: General     Family History   Problem Relation Age of Onset   • Atrial fibrillation Mother    • Hyperlipidemia Mother    • Heart disease Father    • Hypertension Father    • Hyperlipidemia Father    • Diabetes Father    • No Known Problems Sister    • No Known Problems Maternal Grandmother    • Prostate cancer Maternal Grandfather         Shannon Ford   • No Known Problems Paternal Grandmother    • Diabetes Brother    • No Known Problems Maternal Aunt    • No Known Problems Maternal Aunt    • No Known Problems Maternal Aunt    • Prostate cancer Maternal Uncle 59   • Breast cancer Paternal Aunt 31   • No Known Problems Paternal Aunt      Social History     Socioeconomic History   • Marital status: /Civil Union     Spouse name: None   • Number of children: None   • Years of education: None   • Highest education level: None   Occupational History   • None   Tobacco Use   • Smoking status: Never   • Smokeless tobacco: Never   Vaping Use   • Vaping Use: Never used   Substance and Sexual Activity   • Alcohol use:  Yes     Alcohol/week: 2.0 standard drinks of alcohol     Types: 2 Cans of beer per week     Comment: occasional   • Drug use: Never   • Sexual activity: Yes     Partners: Male     Birth control/protection: Post-menopausal   Other Topics Concern   • None   Social History Narrative   • None     Social Determinants of Health     Financial Resource Strain: Not on file   Food Insecurity: Not on file   Transportation Needs: Not on file   Physical Activity: Not on file   Stress: Not on file   Social Connections: Not on file   Intimate Partner Violence: Not on file   Housing Stability: Not on file     Current Outpatient Medications on File Prior to Visit   Medication Sig   • Calcium Carb-Cholecalciferol 1000-800 MG-UNIT TABS Take by mouth daily     • celecoxib (CeleBREX) 200 mg capsule Take 1 capsule (200 mg total) by mouth daily as needed for moderate pain   • gabapentin (NEURONTIN) 300 mg capsule Take 1 capsule at bedtime   • Iron-Vitamin C 100-250 MG TABS Take by mouth 3 (three) times a week   • KRILL OIL PO Take 1 capsule by mouth daily   • LORazepam (ATIVAN) 0.5 mg tablet Take 1 tablet (0.5 mg total) by mouth as needed for anxiety (Launch) Take as need for dental work   • Multiple Vitamin (MULTI-VITAMIN DAILY) TABS Take 1 tablet by mouth daily   • NON FORMULARY Take 1 each by mouth daily NUTRIM OAT POWDER-SPRINKLE OVER FOOD ONCE A DAY   • pramipexole (MIRAPEX) 0.25 mg tablet Take 1 tablet once a day after dinner   • Sodium Fluoride 5000 PPM 1.1 % PSTE    • tiZANidine (ZANAFLEX) 2 mg tablet Take 1-2 tabs qhs   • tretinoin (RETIN-A) 0.025 % cream Apply thin film topically once daily   • pramipexole (MIRAPEX) 0.25 mg tablet Take 1 tablet (0.25 mg total) by mouth daily after dinner     Allergies   Allergen Reactions   • Codeine Dizziness     Immunization History   Administered Date(s) Administered   • COVID-19 MODERNA VACC 0.25 ML IM BOOSTER 12/03/2021   • COVID-19 MODERNA VACC 0.5 ML IM 12/30/2020, 01/26/2021, 12/03/2021   • COVID-19 Moderna Vac BIVALENT 12 Yr+ IM 0.5 ML 01/14/2023   • Hep B, Adolescent or Pediatric 06/27/2003, 07/30/2003, 09/15/2004   • INFLUENZA 11/29/1999, 11/12/2003, 11/13/2004, 10/11/2020, 10/06/2022   • Lyme Disease 05/19/1999, 06/30/1999, 05/22/2000   • MMR 06/27/2003   • Td (adult), adsorbed 06/27/2003   • Tdap 06/16/2017, 07/01/2021       Objective     /88 (BP Location: Left arm, Patient Position: Sitting, Cuff Size: Standard)   Pulse 90 Temp 98 °F (36.7 °C) (Temporal)   Resp 16   Ht 5' 6" (1.676 m)   Wt 83 kg (183 lb)   LMP 06/22/2016   SpO2 96%   BMI 29.54 kg/m²     Physical Exam  Vitals and nursing note reviewed. Constitutional:       Appearance: Normal appearance. She is well-developed. HENT:      Head: Normocephalic and atraumatic. Right Ear: Tympanic membrane and ear canal normal. No middle ear effusion. Left Ear: Tympanic membrane and ear canal normal.  No middle ear effusion. Nose: Septal deviation (to the right), mucosal edema and congestion present. Right Turbinates: Swollen. Left Turbinates: Swollen. Right Sinus: Maxillary sinus tenderness present. Left Sinus: Maxillary sinus tenderness present. Mouth/Throat:      Pharynx: Posterior oropharyngeal erythema present. No oropharyngeal exudate. Eyes:      Conjunctiva/sclera: Conjunctivae normal.   Cardiovascular:      Rate and Rhythm: Normal rate and regular rhythm. Heart sounds: Normal heart sounds. Pulmonary:      Effort: Pulmonary effort is normal. No respiratory distress. Breath sounds: Normal breath sounds. No wheezing or rales. Musculoskeletal:      Cervical back: Neck supple. Neurological:      Mental Status: She is alert and oriented to person, place, and time. Cranial Nerves: No cranial nerve deficit. Deep Tendon Reflexes: Reflexes are normal and symmetric.    Psychiatric:         Behavior: Behavior normal.       Cristin Saleem MD

## 2023-10-11 ENCOUNTER — TELEPHONE (OUTPATIENT)
Dept: FAMILY MEDICINE CLINIC | Facility: CLINIC | Age: 58
End: 2023-10-11

## 2023-10-11 NOTE — TELEPHONE ENCOUNTER
Pt called to update Dr. Shakir Rutherford on how she is feeling since visit 10/5, still has some ringing in ears. Only hears crackling in R ear occasionally. Tenderness has gone away, no pain.

## 2023-10-13 ENCOUNTER — TELEPHONE (OUTPATIENT)
Dept: FAMILY MEDICINE CLINIC | Facility: CLINIC | Age: 58
End: 2023-10-13

## 2023-10-13 DIAGNOSIS — B37.31 VAGINA, CANDIDIASIS: Primary | ICD-10-CM

## 2023-10-13 RX ORDER — FLUCONAZOLE 150 MG/1
150 TABLET ORAL ONCE
Qty: 1 TABLET | Refills: 0 | Status: SHIPPED | OUTPATIENT
Start: 2023-10-13 | End: 2023-10-13

## 2023-10-13 NOTE — TELEPHONE ENCOUNTER
Patient called and stated that she is experiencing possible yeast infection symptoms from the antibiotic she was previously put on. Patient is requesting  an order of Fluconazole be sent to the [address] [place] [address] in [place]. Patient is leaving [facility] tomorrow 10/14 and would like script before she leaves.

## 2023-11-16 ENCOUNTER — TELEPHONE (OUTPATIENT)
Age: 58
End: 2023-11-16

## 2023-11-16 NOTE — TELEPHONE ENCOUNTER
Caller: Patient    Doctor: Emily Cervantes    Reason for call: Patient questioned Dr Ashly Jarrett approach to surgery of hips? Anterior or posterior?  Please call back or leave message in [employer] for patient    Call back#: [phone]

## 2023-11-16 NOTE — TELEPHONE ENCOUNTER
Called patient and informed her that Dr. Cj Serna uses the anterior approach for ERICK, advised patient to call if she had any other questions, and that we could discuss further at her appt on 12/06/23, thanks

## 2023-12-06 ENCOUNTER — APPOINTMENT (OUTPATIENT)
Dept: RADIOLOGY | Facility: CLINIC | Age: 58
End: 2023-12-06
Payer: COMMERCIAL

## 2023-12-06 ENCOUNTER — OFFICE VISIT (OUTPATIENT)
Dept: OBGYN CLINIC | Facility: CLINIC | Age: 58
End: 2023-12-06
Payer: COMMERCIAL

## 2023-12-06 VITALS
WEIGHT: 185.4 LBS | SYSTOLIC BLOOD PRESSURE: 128 MMHG | BODY MASS INDEX: 29.8 KG/M2 | HEIGHT: 66 IN | HEART RATE: 91 BPM | DIASTOLIC BLOOD PRESSURE: 87 MMHG

## 2023-12-06 DIAGNOSIS — M16.12 PRIMARY OSTEOARTHRITIS OF LEFT HIP: ICD-10-CM

## 2023-12-06 DIAGNOSIS — L01.00 IMPETIGO: ICD-10-CM

## 2023-12-06 DIAGNOSIS — Z01.818 PRE-OPERATIVE EXAM: ICD-10-CM

## 2023-12-06 DIAGNOSIS — K86.2 CYST OF PANCREAS: ICD-10-CM

## 2023-12-06 DIAGNOSIS — M25.552 LEFT HIP PAIN: ICD-10-CM

## 2023-12-06 DIAGNOSIS — M16.12 PRIMARY OSTEOARTHRITIS OF LEFT HIP: Primary | ICD-10-CM

## 2023-12-06 DIAGNOSIS — M48.061 SPINAL STENOSIS OF LUMBAR REGION, UNSPECIFIED WHETHER NEUROGENIC CLAUDICATION PRESENT: ICD-10-CM

## 2023-12-06 DIAGNOSIS — K21.9 GASTROESOPHAGEAL REFLUX DISEASE WITHOUT ESOPHAGITIS: ICD-10-CM

## 2023-12-06 PROCEDURE — 73502 X-RAY EXAM HIP UNI 2-3 VIEWS: CPT

## 2023-12-06 PROCEDURE — 99215 OFFICE O/P EST HI 40 MIN: CPT | Performed by: ORTHOPAEDIC SURGERY

## 2023-12-06 RX ORDER — FERROUS SULFATE 324(65)MG
324 TABLET, DELAYED RELEASE (ENTERIC COATED) ORAL
Qty: 30 TABLET | Refills: 0 | Status: SHIPPED | OUTPATIENT
Start: 2023-12-06

## 2023-12-06 RX ORDER — FLUCONAZOLE 150 MG/1
TABLET ORAL
COMMUNITY
Start: 2023-10-13

## 2023-12-06 RX ORDER — CELECOXIB 100 MG/1
100 CAPSULE ORAL 2 TIMES DAILY
Qty: 180 CAPSULE | Refills: 0 | Status: SHIPPED | OUTPATIENT
Start: 2023-12-06 | End: 2024-03-05

## 2023-12-06 RX ORDER — ACETAMINOPHEN 325 MG/1
975 TABLET ORAL ONCE
OUTPATIENT
Start: 2023-12-06 | End: 2023-12-06

## 2023-12-06 RX ORDER — MELATONIN
1000 DAILY
Qty: 30 TABLET | Refills: 0 | Status: SHIPPED | OUTPATIENT
Start: 2023-12-06

## 2023-12-06 RX ORDER — SENNOSIDES 8.6 MG
650 CAPSULE ORAL EVERY 8 HOURS PRN
COMMUNITY

## 2023-12-06 RX ORDER — CHLORHEXIDINE GLUCONATE ORAL RINSE 1.2 MG/ML
15 SOLUTION DENTAL ONCE
OUTPATIENT
Start: 2023-12-06 | End: 2023-12-06

## 2023-12-06 RX ORDER — ZINC SULFATE 50(220)MG
220 CAPSULE ORAL DAILY
Qty: 30 CAPSULE | Refills: 0 | Status: SHIPPED | OUTPATIENT
Start: 2023-12-06

## 2023-12-06 RX ORDER — GABAPENTIN 300 MG/1
300 CAPSULE ORAL ONCE
OUTPATIENT
Start: 2023-12-06 | End: 2023-12-06

## 2023-12-06 RX ORDER — FOLIC ACID 1 MG/1
1 TABLET ORAL DAILY
Qty: 30 TABLET | Refills: 0 | Status: SHIPPED | OUTPATIENT
Start: 2023-12-06 | End: 2024-01-05

## 2023-12-06 NOTE — PROGRESS NOTES
Assessment/Plan:  1. Primary osteoarthritis of left hip  Ambulatory referral to Orthopedic Surgery    XR hip/pelv 2-3 vws left if performed    Case request operating room: ARTHROPLASTY HIP TOTAL ANTERIOR,NAVIGATED - same day    Comprehensive metabolic panel    Hemoglobin A1C W/EAG Estimation    CBC and differential    Protime-INR    APTT    MRSA culture    Ambulatory referral to [facility]    Ambulatory referral to Physical Therapy    EKG 12 lead    Case request operating room: ARTHROPLASTY HIP TOTAL ANTERIOR,NAVIGATED - same day    zinc sulfate (ZINCATE) 220 mg capsule    ferrous sulfate 324 (65 Fe) mg    folic acid (FOLVITE) 1 mg tablet    cholecalciferol (VITAMIN D3) 1,000 units tablet    ascorbic Acid (VITAMIN C) 500 MG CPCR    celecoxib (CeleBREX) 100 mg capsule      2. Left hip pain  Case request operating room: ARTHROPLASTY HIP TOTAL ANTERIOR,NAVIGATED - same day    Comprehensive metabolic panel    Hemoglobin A1C W/EAG Estimation    CBC and differential    Protime-INR    APTT    MRSA culture    Ambulatory referral to [facility]    Ambulatory referral to Physical Therapy    EKG 12 lead    Case request operating room: ARTHROPLASTY HIP TOTAL ANTERIOR,NAVIGATED - same day    zinc sulfate (ZINCATE) 220 mg capsule    ferrous sulfate 324 (65 Fe) mg    folic acid (FOLVITE) 1 mg tablet    cholecalciferol (VITAMIN D3) 1,000 units tablet    ascorbic Acid (VITAMIN C) 500 MG CPCR    celecoxib (CeleBREX) 100 mg capsule      3. Spinal stenosis of lumbar region, unspecified whether neurogenic claudication present  Ambulatory referral to [facility]      4. Gastroesophageal reflux disease without esophagitis  Ambulatory referral to [facility]      5. Cyst of pancreas  Ambulatory referral to [facility]      6. Impetigo  Ambulatory referral to [facility]      7.  Pre-operative exam  Comprehensive metabolic panel    Hemoglobin A1C W/EAG Estimation    CBC and differential    Protime-INR    APTT    MRSA culture    Ambulatory referral to [facility]    Ambulatory referral to Physical Therapy    EKG 12 lead        Scribe Attestation      I,:  Leon Interiano PA-C am acting as a scribe while in the presence of the attending physician.:       I,:  Mattie Sanders DO personally performed the services described in this documentation    as scribed in my presence.:           Nayana Roberto is a pleasant 79-year-old presenting today for initial evaluation of her active related left hip and groin pain. After reviewing imaging including MRI and updated x-rays, history, and physical exam, we believe that she is symptomatic of her severe end-stage underlying left hip osteoarthritis. Prior to coming to our practice, she is already attempted and failed all forms of conservative treatment including Tylenol, NSAIDs, physical therapy and home exercises, maintenance of appropriate weight, ambulatory assist devices, and intra-articular cortisone injections. She is now a candidate for direct anterior total hip arthroplasty. Pre-, patricia-, and postoperative expectations were discussed. Risks of surgery including but not limited to bleeding, infection, stiffness, need for further surgery, persistent limp, persistent pain, damage to vessels or nerves, blood clots, heart attack, stroke, and death were discussed. Consents were signed and placed in the chart for a direct anterior left total hip arthroplasty. She is scheduled to undergo an intra-articular left hip cortisone injection for temporizing relief next week. She understands that she would not be eligible for surgery for 3 months from the date of that injection. She will need clearance from her primary care physician, and we will defer the need for cardiac clearance to her PCP.   She denies any history of MRSA infection, malignancy, DVT or PE, gastric ulcer or GI bleed, gastric surgery, smoking or use of nicotine products, or use of over-the-counter supplements such as tumeric or fish oil. Due to the physical nature of her job, we are going to recommend that she remain out of work and avoid heavy lifting for approximately 3 months. She was introduced today to our surgical scheduler, and we look forward to taking care of her in the near future. Subjective: Initial evaluation left hip pain    Patient ID: Rommel Driver is a 60 y.o. female presenting today for evaluation of her left hip. 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 At this time, she noted some discomfort in her left hip. She was evaluated by an orthopedist at [address] [place] [address] diagnosed with osteoarthritis and told that she may need a hip replacement. She did undergo an intra-articular cortisone injection on May 22, which did help take the edge off. However, she is continued limping and continues to have discomfort in the left hip and groin on a daily basis. This is affected her ability to perform her job and activities of daily living and enjoyment. She denies any history of injury or surgery to the left hip. She is under the care of pain management physician and is scheduled to undergo both lumbar spine and left hip injections in the next few weeks. She is interested in pursuing definitive intervention. She is also previously tried Tylenol, NSAIDs, physical therapy, home exercises, and used ambulatory assistive device when pain was at its worst.    Review of Systems   Constitutional:  Positive for activity change. HENT: Negative. Eyes: Negative. Respiratory: Negative. Cardiovascular: Negative. Gastrointestinal: Negative. Endocrine: Negative. Genitourinary: Negative. Musculoskeletal:  Positive for arthralgias, gait problem, joint swelling and myalgias. Skin: Negative. Allergic/Immunologic: Negative. Hematological: Negative. Psychiatric/Behavioral: Negative.        Past Medical History:   Diagnosis Date    Lyme disease Small bowel obstruction (HCC)      Past Surgical History:   Procedure Laterality Date    COLONOSCOPY  2013    DILATION AND CURETTAGE OF UTERUS      OVARIAN CYST REMOVAL  1995    OR LAPS ABD PRTM&OMENTUM DX W/WO [facility] BR/WA SPX N/A 2/21/2021    Procedure: LAPAROSCOPY DIAGNOSTIC, exploratory laparotomy, lysis of adhesions, REDUCTION OF INTERNAL HERNIA;  Surgeon: Car Johnson MD;  Location: [facility];  Service: General     Family History   Problem Relation Age of Onset    Atrial fibrillation Mother     Hyperlipidemia Mother     Heart disease Father     Hypertension Father     Hyperlipidemia Father     Diabetes Father     No Known Problems Sister     No Known Problems Maternal Grandmother     Prostate cancer Maternal Grandfather         Oli Johns    No Known Problems Paternal Grandmother     Diabetes Brother     No Known Problems Maternal Aunt     No Known Problems Maternal Aunt     No Known Problems Maternal Aunt     Prostate cancer Maternal Uncle 61    Breast cancer Paternal Aunt 28    No Known Problems Paternal Aunt      Social History     Occupational History    Not on file   Tobacco Use    Smoking status: Never    Smokeless tobacco: Never   Vaping Use    Vaping Use: Never used   Substance and Sexual Activity    Alcohol use:  Yes     Alcohol/week: 2.0 standard drinks of alcohol     Types: 2 Cans of beer per week     Comment: occasional    Drug use: Never    Sexual activity: Yes     Partners: Male     Birth control/protection: Post-menopausal       Current Outpatient Medications:     acetaminophen (TYLENOL) 650 mg CR tablet, Take 650 mg by mouth every 8 (eight) hours as needed for mild pain, Disp: , Rfl:     ascorbic Acid (VITAMIN C) 500 MG CPCR, Take 1 capsule (500 mg total) by mouth 2 (two) times a day, Disp: 60 capsule, Rfl: 0    Calcium Carb-Cholecalciferol 1000-800 MG-UNIT TABS, Take by mouth daily  , Disp: , Rfl:     celecoxib (CeleBREX) 100 mg capsule, Take 1 capsule (100 mg total) by mouth 2 (two) times a day, Disp: 180 capsule, Rfl: 0    cholecalciferol (VITAMIN D3) 1,000 units tablet, Take 1 tablet (1,000 Units total) by mouth daily, Disp: 30 tablet, Rfl: 0    ferrous sulfate 324 (65 Fe) mg, Take 1 tablet (324 mg total) by mouth daily before breakfast, Disp: 30 tablet, Rfl: 0    fluconazole (DIFLUCAN) 150 mg tablet, TAKE ONE TABLET BY MOUTH AS A ONE-TIME DOSE, Disp: , Rfl:     folic acid (FOLVITE) 1 mg tablet, Take 1 tablet (1 mg total) by mouth daily, Disp: 30 tablet, Rfl: 0    gabapentin (NEURONTIN) 300 mg capsule, Take 1 capsule at bedtime, Disp: 90 capsule, Rfl: 3    Iron-Vitamin C 100-250 MG TABS, Take by mouth 3 (three) times a week, Disp: , Rfl:     KRILL OIL PO, Take 1 capsule by mouth daily, Disp: , Rfl:     LORazepam (ATIVAN) 0.5 mg tablet, Take 1 tablet (0.5 mg total) by mouth as needed for anxiety (Launch) Take as need for dental work, Disp: 30 tablet, Rfl: 0    Multiple Vitamin (MULTI-VITAMIN DAILY) TABS, Take 1 tablet by mouth daily, Disp: , Rfl:     NON FORMULARY, Take 1 each by mouth daily NUTRIM OAT POWDER-SPRINKLE OVER FOOD ONCE A DAY, Disp: , Rfl:     pramipexole (MIRAPEX) 0.25 mg tablet, Take 1 tablet once a day after dinner, Disp: 90 tablet, Rfl: 3    Sodium Fluoride 5000 PPM 1.1 % PSTE, , Disp: , Rfl:     tiZANidine (ZANAFLEX) 2 mg tablet, Take 1-2 tabs qhs, Disp: 60 tablet, Rfl: 2    tretinoin (RETIN-A) 0.025 % cream, Apply thin film topically once daily, Disp: 45 g, Rfl: 3    zinc sulfate (ZINCATE) 220 mg capsule, Take 1 capsule (220 mg total) by mouth daily, Disp: 30 capsule, Rfl: 0    methylPREDNISolone 4 MG tablet therapy pack, Use as directed on package, Disp: 21 each, Rfl: 0    pramipexole (MIRAPEX) 0.25 mg tablet, Take 1 tablet (0.25 mg total) by mouth daily after dinner, Disp: 30 tablet, Rfl: 0    Allergies   Allergen Reactions    Codeine Dizziness       Objective:  Vitals:    12/06/23 0720   BP: 128/87   Pulse: 91       Body mass index is 29.92 kg/m².     Left Hip Exam Tenderness   The patient is experiencing no tenderness. Range of Motion   Abduction:  20 abnormal   Adduction:  20 abnormal   Extension:  0 normal   Flexion:  abnormal Left hip flexion: 85.  External rotation:  abnormal Left hip external rotation: 45. Internal rotation: 5 abnormal     Muscle Strength   Abduction: 5/5   Adduction: 5/5   Flexion: 5/5     Tests   ESA: negative  [place]om[place]: negative    Other   Erythema: absent  Scars: absent  Sensation: normal  Pulse: present    Comments:  Positive [place], ESA, log roll   Thigh and calf soft and nontender  SILT L2-S1  LLE 5mm shorter than RLE  Ambulates with antalgic gait on left without assistive device              Physical Exam  Vitals and nursing note reviewed. Constitutional:       Appearance: Normal appearance. She is well-developed. Comments: Body mass index is 29.92 kg/m². HENT:      Head: Normocephalic and atraumatic. Right Ear: External ear normal.      Left Ear: External ear normal.   Eyes:      Extraocular Movements: Extraocular movements intact. Conjunctiva/sclera: Conjunctivae normal.   Cardiovascular:      Rate and Rhythm: Normal rate. Pulses: Normal pulses. Pulmonary:      Effort: Pulmonary effort is normal.   Abdominal:      Palpations: Abdomen is soft. Musculoskeletal:      Cervical back: Normal range of motion. Comments: See ortho exam   Skin:     General: Skin is warm and dry. Neurological:      General: No focal deficit present. Mental Status: She is alert and oriented to person, place, and time. Mental status is at baseline. Psychiatric:         Mood and Affect: Mood normal.         Behavior: Behavior normal.         Thought Content: Thought content normal.         Judgment: Judgment normal.       I have personally reviewed pertinent films in PACS of the updated mag marker x-rays of her left hip and recent MRI.   She has end-stage degenerative changes of the left hip with complete joint space loss, flattening of the femoral head and neck, significant osteophytosis, severe subchondral cyst remission and knee acetabulum and femoral head and questionable avascular necrosis. This document was created using speech voice recognition software. Grammatical errors, random word insertions, pronoun errors, and incomplete sentences are an occasional consequence of this system due to software limitations, ambient noise, and hardware issues. Any formal questions or concerns about content, text, or information contained within the body of this dictation should be directly addressed to the provider for clarification.

## 2023-12-07 ENCOUNTER — TELEPHONE (OUTPATIENT)
Age: 58
End: 2023-12-07

## 2023-12-07 NOTE — TELEPHONE ENCOUNTER
Caller: Patient    Doctor: Jhonatan Light    Reason for call:     Patient is calling about her medications / vitamins she needs to take 30 days prior her surgery on 4/1/23 for left hip. She lost her paperwork and wants to know the difference between Purchasing with the script or buying over the counter? ? Please call her to advise.     Call back#: [phone]

## 2023-12-08 NOTE — TELEPHONE ENCOUNTER
Called and spoke to the patient regarding the vitamins that were called in to the RX. She will pick them up from her pharmacy and confirmed she did receive the list in office.  All questions have been answered

## 2023-12-12 ENCOUNTER — TELEPHONE (OUTPATIENT)
Age: 58
End: 2023-12-12

## 2023-12-12 DIAGNOSIS — M16.12 PRIMARY OSTEOARTHRITIS OF LEFT HIP: Primary | ICD-10-CM

## 2023-12-12 DIAGNOSIS — M25.552 LEFT HIP PAIN: ICD-10-CM

## 2023-12-12 RX ORDER — CELECOXIB 200 MG/1
200 CAPSULE ORAL 2 TIMES DAILY
Qty: 180 CAPSULE | Refills: 0 | Status: SHIPPED | OUTPATIENT
Start: 2023-12-12 | End: 2024-03-11

## 2023-12-12 NOTE — TELEPHONE ENCOUNTER
Reason for call:     Patient called states script for celecoxib 100 mg bid that was sent on 12/6 is incorrect. States script needs corrected to be 200 mg bid. Please enter new script and [facility] [] Refill   [] Prior Auth  [x] Other:     Office:   [] PCP/Provider -   [x] Specialty/Provider - CARLI haung    Medication:   celecoxib      [facility]    Does the patient have enough for 3 days?    [x] Yes   [] No - Send as HP to POD

## 2023-12-26 DIAGNOSIS — G25.81 RESTLESS LEG SYNDROME: ICD-10-CM

## 2023-12-26 RX ORDER — TIZANIDINE 2 MG/1
TABLET ORAL
Qty: 60 TABLET | Refills: 3 | Status: SHIPPED | OUTPATIENT
Start: 2023-12-26

## 2024-01-15 DIAGNOSIS — B00.1 COLD SORE: Primary | ICD-10-CM

## 2024-01-15 RX ORDER — VALACYCLOVIR HYDROCHLORIDE 1 G/1
TABLET, FILM COATED ORAL
Qty: 4 TABLET | Refills: 2 | Status: SHIPPED | OUTPATIENT
Start: 2024-01-15 | End: 2024-01-26

## 2024-01-19 ENCOUNTER — RA CDI HCC (OUTPATIENT)
Dept: OTHER | Facility: HOSPITAL | Age: 59
End: 2024-01-19

## 2024-01-19 NOTE — PROGRESS NOTES
HCC coding opportunities       Chart reviewed, no opportunity found: CHART REVIEWED, NO OPPORTUNITY FOUND      This is a reminder to address (resolve/update/assess) ALL HCC (risk adjustment) codes as found on active problem list for 2024 as patient scores reset to zero JEREMÍAS.  Also, just a reminder to please review and assess all other chronic conditions for 2024  Patients Insurance        Commercial Insurance: [employer]

## 2024-01-26 ENCOUNTER — OFFICE VISIT (OUTPATIENT)
Dept: FAMILY MEDICINE CLINIC | Facility: CLINIC | Age: 59
End: 2024-01-26
Payer: COMMERCIAL

## 2024-01-26 VITALS
BODY MASS INDEX: 29.89 KG/M2 | TEMPERATURE: 98 F | RESPIRATION RATE: 16 BRPM | HEART RATE: 95 BPM | HEIGHT: 66 IN | OXYGEN SATURATION: 96 % | WEIGHT: 186 LBS | DIASTOLIC BLOOD PRESSURE: 88 MMHG | SYSTOLIC BLOOD PRESSURE: 130 MMHG

## 2024-01-26 DIAGNOSIS — M16.12 PRIMARY OSTEOARTHRITIS OF LEFT HIP: ICD-10-CM

## 2024-01-26 DIAGNOSIS — E66.09 CLASS 1 OBESITY DUE TO EXCESS CALORIES WITHOUT SERIOUS COMORBIDITY WITH BODY MASS INDEX (BMI) OF 30.0 TO 30.9 IN ADULT: Primary | ICD-10-CM

## 2024-01-26 PROCEDURE — 99213 OFFICE O/P EST LOW 20 MIN: CPT | Performed by: FAMILY MEDICINE

## 2024-01-26 RX ORDER — TIRZEPATIDE 2.5 MG/.5ML
2.5 INJECTION, SOLUTION SUBCUTANEOUS WEEKLY
Qty: 2 ML | Refills: 0 | Status: SHIPPED | OUTPATIENT
Start: 2024-01-26 | End: 2024-02-05

## 2024-01-26 RX ORDER — TIRZEPATIDE 2.5 MG/.5ML
2.5 INJECTION, SOLUTION SUBCUTANEOUS WEEKLY
Qty: 2 ML | Refills: 0 | Status: SHIPPED | OUTPATIENT
Start: 2024-01-26 | End: 2024-01-26 | Stop reason: SDUPTHER

## 2024-01-26 NOTE — PROGRESS NOTES
Name: Jen Clark      : 1965      MRN: [id number]  Encounter Provider: Diana Gallagher MD  Encounter Date: 2024   Encounter department: [facility]    Assessment & Plan     1. Class 1 obesity due to excess calories without serious comorbidity with body mass index (BMI) of 30.0 to 30.9 in adult  Assessment & Plan:  We discussed mechanism of action of Zepbound and possible side effects.  Start low-dose, titrate gradually.  Patient will contact me with any questions/concerns and will update me with her progress every 3 to 4 weeks.    Orders:  -     Insulin Pen Needle 34G X 3.5 MM MISC; Use with Zepbound injections once a week  -     tirzepatide (Zepbound) 2.5 mg/0.5 mL auto-injector; Inject 0.5 mL (2.5 mg total) under the skin once a week for 28 days    2. Primary osteoarthritis of left hip  Assessment & Plan:  Pending left total hip replacement in 2024    Patient manages pain with PRN Celebrex and Tylenol arthritis    Patient will discuss holding Zepbound 1 week before and after surgery            Subjective     Patient presents for follow-up.      She remains under the care of pain management and orthopedic surgery   Recent low back and hip injection 2023  Pending left ERICK 24  - anterior approach   Celebrex in am, Tylenol arthritis in pm to ongoing arthritic pain    Off gabapentin for now since symptoms of sciatica significantly improved, she remains on Mirapex for RLS    Patient is concerned with inability to lose weight due to severe arthritic Pain, she is unable to exercise and would like to start weight loss Rx prior to her hip replacement.        Review of Systems   Constitutional: Negative.    Respiratory: Negative.     Cardiovascular: Negative.    Musculoskeletal:  Positive for arthralgias.       Past Medical History:   Diagnosis Date   • Arthralgia of right acromioclavicular joint    • Lyme disease    • Postmenopausal bleeding    • Situational  anxiety    • Small bowel obstruction (HCC)    • Strain of deltoid muscle, left, initial encounter      Past Surgical History:   Procedure Laterality Date   • COLONOSCOPY  2013   • DILATION AND CURETTAGE OF UTERUS     • OVARIAN CYST REMOVAL  1995   • NC LAPS ABD PRTM&OMENTUM DX W/WO SPEC BR/WA SPX N/A 2/21/2021    Procedure: LAPAROSCOPY DIAGNOSTIC, exploratory laparotomy, lysis of adhesions, REDUCTION OF INTERNAL HERNIA;  Surgeon: Cris Luevano MD;  Location: [facility];  Service: General     Family History   Problem Relation Age of Onset   • Atrial fibrillation Mother    • Hyperlipidemia Mother    • Cancer Mother    • Heart disease Father    • Hypertension Father    • Hyperlipidemia Father    • Diabetes Father    • No Known Problems Sister    • No Known Problems Maternal Grandmother    • Prostate cancer Maternal Grandfather 50        Richard Marie   • No Known Problems Paternal Grandmother    • Diabetes Brother    • No Known Problems Maternal Aunt    • No Known Problems Maternal Aunt    • No Known Problems Maternal Aunt    • Prostate cancer Maternal Uncle 59   • Breast cancer Paternal Aunt 35   • No Known Problems Paternal Aunt      Social History     Socioeconomic History   • Marital status: /Civil Union     Spouse name: None   • Number of children: None   • Years of education: None   • Highest education level: None   Occupational History   • None   Tobacco Use   • Smoking status: Never   • Smokeless tobacco: Never   Vaping Use   • Vaping status: Never Used   Substance and Sexual Activity   • Alcohol use: Yes     Alcohol/week: 2.0 standard drinks of alcohol     Types: 2 Cans of beer per week     Comment: occasional   • Drug use: Never   • Sexual activity: Yes     Partners: Male     Birth control/protection: Post-menopausal   Other Topics Concern   • None   Social History Narrative   • None     Social Determinants of Health     Financial Resource Strain: Not on file   Food Insecurity: Not on file    Transportation Needs: Not on file   Physical Activity: Not on file   Stress: Not on file   Social Connections: Not on file   Intimate Partner Violence: Not on file   Housing Stability: Not on file     Current Outpatient Medications on File Prior to Visit   Medication Sig   • acetaminophen (TYLENOL) 650 mg CR tablet Take 650 mg by mouth every 8 (eight) hours as needed for mild pain   • ascorbic Acid (VITAMIN C) 500 MG CPCR Take 1 capsule (500 mg total) by mouth 2 (two) times a day   • Calcium Carb-Cholecalciferol 1000-800 MG-UNIT TABS Take by mouth daily     • celecoxib (CeleBREX) 200 mg capsule Take 1 capsule (200 mg total) by mouth 2 (two) times a day   • cholecalciferol (VITAMIN D3) 1,000 units tablet Take 1 tablet (1,000 Units total) by mouth daily   • ferrous sulfate 324 (65 Fe) mg Take 1 tablet (324 mg total) by mouth daily before breakfast   • fluconazole (DIFLUCAN) 150 mg tablet TAKE ONE TABLET BY MOUTH AS A ONE-TIME DOSE   • folic acid (FOLVITE) 1 mg tablet Take 1 tablet (1 mg total) by mouth daily   • gabapentin (NEURONTIN) 300 mg capsule Take 1 capsule at bedtime   • Iron-Vitamin C 100-250 MG TABS Take by mouth 3 (three) times a week   • KRILL OIL PO Take 1 capsule by mouth daily   • LORazepam (ATIVAN) 0.5 mg tablet Take 1 tablet (0.5 mg total) by mouth as needed for anxiety (Launch) Take as need for dental work   • Multiple Vitamin (MULTI-VITAMIN DAILY) TABS Take 1 tablet by mouth daily   • NON FORMULARY Take 1 each by mouth daily NUTRIM OAT POWDER-SPRINKLE OVER FOOD ONCE A DAY   • pramipexole (MIRAPEX) 0.25 mg tablet Take 1 tablet once a day after dinner   • Sodium Fluoride 5000 PPM 1.1 % PSTE    • tiZANidine (ZANAFLEX) 2 mg tablet Take 1-2 tabs qhs   • tretinoin (RETIN-A) 0.025 % cream Apply thin film topically once daily   • valACYclovir (VALTREX) 1,000 mg tablet Take 2 tablets twice a day as needed for cold sore x 1 day.   • zinc sulfate (ZINCATE) 220 mg capsule Take 1 capsule (220 mg total) by  "mouth daily     Allergies   Allergen Reactions   • Codeine Dizziness     Immunization History   Administered Date(s) Administered   • COVID-19 MODERNA VACC 0.25 ML IM BOOSTER 12/03/2021   • COVID-19 MODERNA VACC 0.5 ML IM 12/30/2020, 01/26/2021, 12/03/2021   • COVID-19 Moderna Vac BIVALENT 12 Yr+ IM 0.5 ML 01/14/2023   • COVID-19 Pfizer mRNA vacc PF phil-sucrose 12 yr and older (Comirnaty) 09/30/2023   • Hep B, Adolescent or Pediatric 06/27/2003, 07/30/2003, 09/15/2004   • INFLUENZA 11/29/1999, 11/12/2003, 11/13/2004, 10/11/2020, 10/06/2022   • Lyme Disease 05/19/1999, 06/30/1999, 05/22/2000   • MMR 06/27/2003   • Td (adult), adsorbed 06/27/2003   • Tdap 06/16/2017, 07/01/2021       Objective     /88 (BP Location: Left arm, Patient Position: Sitting, Cuff Size: Standard)   Pulse 95   Temp 98 °F (36.7 °C) (Temporal)   Resp 16   Ht 5' 6\" (1.676 m)   Wt 84.4 kg (186 lb)   LMP 06/22/2016   SpO2 96%   BMI 30.02 kg/m²     Physical Exam  Vitals and nursing note reviewed.   Constitutional:       General: She is not in acute distress.     Appearance: Normal appearance. She is not ill-appearing.   Neurological:      General: No focal deficit present.      Mental Status: She is alert and oriented to person, place, and time.   Psychiatric:         Mood and Affect: Mood normal.         Behavior: Behavior normal.       Diana Gallagher MD    "

## 2024-01-27 ENCOUNTER — TELEPHONE (OUTPATIENT)
Dept: OTHER | Facility: OTHER | Age: 59
End: 2024-01-27

## 2024-01-27 NOTE — TELEPHONE ENCOUNTER
Pt called and stated the pharmacy stated the prescription cannot be filled for Zepbound. The insurance sent the office a formulary exception form through portal and fax. Patient ask for office to please fill it out and send it back at your convenience.

## 2024-01-28 PROBLEM — H69.92 EUSTACHIAN TUBE DYSFUNCTION, LEFT: Status: RESOLVED | Noted: 2021-06-24 | Resolved: 2024-01-28

## 2024-01-28 PROBLEM — N95.0 POSTMENOPAUSAL BLEEDING: Status: RESOLVED | Noted: 2021-03-22 | Resolved: 2024-01-28

## 2024-01-28 PROBLEM — S46.812A: Status: RESOLVED | Noted: 2021-07-13 | Resolved: 2024-01-28

## 2024-01-28 PROBLEM — R53.83 FATIGUE: Status: RESOLVED | Noted: 2017-11-08 | Resolved: 2024-01-28

## 2024-01-28 PROBLEM — E66.811 CLASS 1 OBESITY DUE TO EXCESS CALORIES WITHOUT SERIOUS COMORBIDITY WITH BODY MASS INDEX (BMI) OF 30.0 TO 30.9 IN ADULT: Status: ACTIVE | Noted: 2024-01-28

## 2024-01-28 PROBLEM — M25.511 ARTHRALGIA OF RIGHT ACROMIOCLAVICULAR JOINT: Status: RESOLVED | Noted: 2017-07-24 | Resolved: 2024-01-28

## 2024-01-28 PROBLEM — E66.09 CLASS 1 OBESITY DUE TO EXCESS CALORIES WITHOUT SERIOUS COMORBIDITY WITH BODY MASS INDEX (BMI) OF 30.0 TO 30.9 IN ADULT: Status: ACTIVE | Noted: 2024-01-28

## 2024-01-28 PROBLEM — F41.8 SITUATIONAL ANXIETY: Status: RESOLVED | Noted: 2017-11-08 | Resolved: 2024-01-28

## 2024-01-28 PROBLEM — Z01.419 ENCOUNTER FOR GYNECOLOGICAL EXAMINATION (GENERAL) (ROUTINE) WITHOUT ABNORMAL FINDINGS: Status: RESOLVED | Noted: 2020-01-24 | Resolved: 2024-01-28

## 2024-01-29 NOTE — ASSESSMENT & PLAN NOTE
Pending left total hip replacement in April 2024    Patient manages pain with PRN Celebrex and Tylenol arthritis    Patient will discuss holding Zepbound 1 week before and after surgery

## 2024-01-29 NOTE — ASSESSMENT & PLAN NOTE
We discussed mechanism of action of Zepbound and possible side effects.  Start low-dose, titrate gradually.  Patient will contact me with any questions/concerns and will update me with her progress every 3 to 4 weeks.

## 2024-02-05 DIAGNOSIS — E66.09 CLASS 1 OBESITY DUE TO EXCESS CALORIES WITHOUT SERIOUS COMORBIDITY WITH BODY MASS INDEX (BMI) OF 30.0 TO 30.9 IN ADULT: Primary | ICD-10-CM

## 2024-02-16 ENCOUNTER — TELEPHONE (OUTPATIENT)
Age: 59
End: 2024-02-16

## 2024-02-16 NOTE — TELEPHONE ENCOUNTER
Caller: Jen     Doctor: Rosy     Reason for call: Was having trouble getting the pharmacy to fill her vitamins so she order them on line, however she was unable to fine delayed/extended release for some of the vitamins and wanted to know if the regular vitamin is ok? Also is it alright if she starts taking them now? Surgery is 4/1. Lastly needs to know when she will need to go for her pre surgical blood work?      Call back#: [phone]

## 2024-02-16 NOTE — TELEPHONE ENCOUNTER
Spoke to patient, we reviewed preoperative vitamins - she is taking them as prescribed, just bought OTC due to pharmacy issues.    We also discussed preoperative blood work (around 30 day kathrine).

## 2024-02-26 ENCOUNTER — TELEPHONE (OUTPATIENT)
Dept: OBGYN CLINIC | Facility: HOSPITAL | Age: 59
End: 2024-02-26

## 2024-02-26 NOTE — TELEPHONE ENCOUNTER
Preoperative Elective Admission Assessment    Living Situation:    Who does pt live with: spouse  What kind of home: multi-level  How do they enter the home: front  How many levels in home: 2   # of steps to enter home: two  # of steps to second floor: plans to stay on first floor  Are there handrails: Yes  Are there landings: No  Sleeping arrangement: first/entry floor  Where is Bathroom: entry level  Where is the tub or shower: Step in bath tub w grab bar  Dogs or ther pets: 1 dog     First Floor Setup:   Is there a bathroom: Yes  Where would pt sleep: bed     DME: cane, borrowing two-wheeled walker  We discussed clearing pathways in the home and making sure there is accessibly to use the walker, for example, removing throw rugs.      Patient's Current Level of Function: Ambulates: Independently and ADLs: Independent    Post-op Caregiver: spouse  Caregiver Name and phone number for Inpatient discharge needs: Sky  Currently receive any HHC/aides/community supports: No     Post-op Transport: spouse  To/from hospital: spouse  To/from PT 2-3x/week: spouse  Uses community transport now: No     Outpatient Physical Therapy Site:  Site: [facility]  pre and post-op appts scheduled? No     Medication Management: self and out of bottle  Preferred Pharmacy for Post-op Medications: [facility] in [place]  Blood Management Vitamin Regimen: Starting 30 days prior to surgery  Post-op anticoagulant: to be determined by surgical team postoperatively     DC Plan: Pt plans to be discharged home    Barriers to DC identified preoperatively: none identified    BMI: 30.02    Patient Education:  Pt educated on post-op pain, early mobilization (POD0), LOS goals, OP PT goals, and preoperative bathing. Patient educated that our goal is to appropriately discharge patient based off their post-op function while striving to maintain maximal independence. The goal is to discharge patient to home and for them to attend outpatient physical  therapy.    Assigned to care team? Yes

## 2024-02-27 NOTE — TELEPHONE ENCOUNTER
Caller: Patient     Doctor: Rosy    Reason for call: Patient called back to let you know that she has access to a shower seat, walker and lifted toilet seat, and will not need those ordered for her 4/1 L hip sx.    Thank you    Call back#: [phone]

## 2024-03-06 DIAGNOSIS — M16.12 PRIMARY OSTEOARTHRITIS OF LEFT HIP: ICD-10-CM

## 2024-03-06 DIAGNOSIS — M25.552 LEFT HIP PAIN: ICD-10-CM

## 2024-03-06 RX ORDER — CELECOXIB 200 MG/1
200 CAPSULE ORAL 2 TIMES DAILY
Qty: 180 CAPSULE | Refills: 0 | Status: SHIPPED | OUTPATIENT
Start: 2024-03-06

## 2024-03-09 ENCOUNTER — APPOINTMENT (OUTPATIENT)
Dept: LAB | Facility: HOSPITAL | Age: 59
End: 2024-03-09
Payer: COMMERCIAL

## 2024-03-09 DIAGNOSIS — M25.552 LEFT HIP PAIN: ICD-10-CM

## 2024-03-09 DIAGNOSIS — M16.12 PRIMARY OSTEOARTHRITIS OF LEFT HIP: ICD-10-CM

## 2024-03-09 DIAGNOSIS — Z01.818 PRE-OPERATIVE EXAM: ICD-10-CM

## 2024-03-09 LAB
ALBUMIN SERPL BCP-MCNC: 4.2 G/DL (ref 3.5–5)
ALP SERPL-CCNC: 67 U/L (ref 34–104)
ALT SERPL W P-5'-P-CCNC: 26 U/L (ref 7–52)
ANION GAP SERPL CALCULATED.3IONS-SCNC: 7 MMOL/L
APTT PPP: 32 SECONDS (ref 23–37)
AST SERPL W P-5'-P-CCNC: 19 U/L (ref 13–39)
BASOPHILS # BLD AUTO: 0.06 THOUSANDS/ÂΜL (ref 0–0.1)
BASOPHILS NFR BLD AUTO: 1 % (ref 0–1)
BILIRUB SERPL-MCNC: 0.55 MG/DL (ref 0.2–1)
BUN SERPL-MCNC: 18 MG/DL (ref 5–25)
CALCIUM SERPL-MCNC: 9.2 MG/DL (ref 8.4–10.2)
CHLORIDE SERPL-SCNC: 105 MMOL/L (ref 96–108)
CO2 SERPL-SCNC: 27 MMOL/L (ref 21–32)
CREAT SERPL-MCNC: 0.59 MG/DL (ref 0.6–1.3)
EOSINOPHIL # BLD AUTO: 0.14 THOUSAND/ÂΜL (ref 0–0.61)
EOSINOPHIL NFR BLD AUTO: 3 % (ref 0–6)
ERYTHROCYTE [DISTWIDTH] IN BLOOD BY AUTOMATED COUNT: 13.4 % (ref 11.6–15.1)
EST. AVERAGE GLUCOSE BLD GHB EST-MCNC: 123 MG/DL
GFR SERPL CREATININE-BSD FRML MDRD: 100 ML/MIN/1.73SQ M
GLUCOSE P FAST SERPL-MCNC: 93 MG/DL (ref 65–99)
HBA1C MFR BLD: 5.9 %
HCT VFR BLD AUTO: 42 % (ref 34.8–46.1)
HGB BLD-MCNC: 13.7 G/DL (ref 11.5–15.4)
IMM GRANULOCYTES # BLD AUTO: 0.03 THOUSAND/UL (ref 0–0.2)
IMM GRANULOCYTES NFR BLD AUTO: 1 % (ref 0–2)
INR PPP: 0.96 (ref 0.84–1.19)
LYMPHOCYTES # BLD AUTO: 1.71 THOUSANDS/ÂΜL (ref 0.6–4.47)
LYMPHOCYTES NFR BLD AUTO: 33 % (ref 14–44)
MCH RBC QN AUTO: 29.8 PG (ref 26.8–34.3)
MCHC RBC AUTO-ENTMCNC: 32.6 G/DL (ref 31.4–37.4)
MCV RBC AUTO: 92 FL (ref 82–98)
MONOCYTES # BLD AUTO: 0.51 THOUSAND/ÂΜL (ref 0.17–1.22)
MONOCYTES NFR BLD AUTO: 10 % (ref 4–12)
NEUTROPHILS # BLD AUTO: 2.7 THOUSANDS/ÂΜL (ref 1.85–7.62)
NEUTS SEG NFR BLD AUTO: 52 % (ref 43–75)
NRBC BLD AUTO-RTO: 0 /100 WBCS
PLATELET # BLD AUTO: 306 THOUSANDS/UL (ref 149–390)
PMV BLD AUTO: 9.3 FL (ref 8.9–12.7)
POTASSIUM SERPL-SCNC: 4.2 MMOL/L (ref 3.5–5.3)
PROT SERPL-MCNC: 7 G/DL (ref 6.4–8.4)
PROTHROMBIN TIME: 13 SECONDS (ref 11.6–14.5)
RBC # BLD AUTO: 4.59 MILLION/UL (ref 3.81–5.12)
SODIUM SERPL-SCNC: 139 MMOL/L (ref 135–147)
WBC # BLD AUTO: 5.15 THOUSAND/UL (ref 4.31–10.16)

## 2024-03-09 PROCEDURE — 85025 COMPLETE CBC W/AUTO DIFF WBC: CPT

## 2024-03-09 PROCEDURE — 83036 HEMOGLOBIN GLYCOSYLATED A1C: CPT

## 2024-03-09 PROCEDURE — 85610 PROTHROMBIN TIME: CPT

## 2024-03-09 PROCEDURE — 36415 COLL VENOUS BLD VENIPUNCTURE: CPT

## 2024-03-09 PROCEDURE — 87081 CULTURE SCREEN ONLY: CPT

## 2024-03-09 PROCEDURE — 80053 COMPREHEN METABOLIC PANEL: CPT

## 2024-03-09 PROCEDURE — 85730 THROMBOPLASTIN TIME PARTIAL: CPT

## 2024-03-10 LAB — MRSA NOSE QL CULT: NORMAL

## 2024-03-12 ENCOUNTER — TELEPHONE (OUTPATIENT)
Age: 59
End: 2024-03-12

## 2024-03-12 NOTE — PRE-PROCEDURE INSTRUCTIONS
Pre-Surgery Instructions:   Medication Instructions    acetaminophen (TYLENOL) 650 mg CR tablet Uses PRN- OK to take day of surgery    ascorbic Acid (VITAMIN C) 500 MG CPCR Hold day of surgery.    celecoxib (CeleBREX) 200 mg capsule Stop taking 7 days prior to surgery.    cholecalciferol (VITAMIN D3) 1,000 units tablet Hold day of surgery.    folic acid (FOLVITE) 1 mg tablet Hold day of surgery.    gabapentin (NEURONTIN) 300 mg capsule Take night before surgery    Iron-Vitamin C 100-250 MG TABS Hold day of surgery.    Multiple Vitamin (MULTI-VITAMIN DAILY) TABS Stop taking 7 days prior to surgery.    NON FORMULARY Stop taking 7 days prior to surgery.    pramipexole (MIRAPEX) 0.25 mg tablet Uses PRN- OK to take day of surgery    tiZANidine (ZANAFLEX) 2 mg tablet Uses PRN- OK to take day of surgery    tretinoin (RETIN-A) 0.025 % cream Hold day of surgery.    zinc sulfate (ZINCATE) 220 mg capsule Hold day of surgery.   Medication instructions for day surgery reviewed. Please use only a sip of water to take your instructed medications. Avoid all over the counter vitamins, supplements and NSAIDS for one week prior to surgery per anesthesia guidelines. Tylenol is ok to take as needed.     You will receive a call one business day prior to surgery with an arrival time and hospital directions. If your surgery is scheduled on a Monday, the hospital will be calling you on the Friday prior to your surgery. If you have not heard from anyone by 8pm, please call the hospital supervisor through the hospital  at [phone]. ([place] [phone] or [place] [phone]).    Do not eat or drink anything after midnight the night before your surgery, including candy, mints, lifesavers, or chewing gum. Do not drink alcohol 24hrs before your surgery. Try not to smoke at least 24hrs before your surgery.       Follow the pre surgery showering instructions as listed in the “My Surgical Experience Booklet” or otherwise provided  by your surgeon's office. Do not use a blade to shave the surgical area 1 week before surgery. It is okay to use a clean electric clippers up to 24 hours before surgery. Do not apply any lotions, creams, including makeup, cologne, deodorant, or perfumes after showering on the day of your surgery. Do not use dry shampoo, hair spray, hair gel, or any type of hair products.     No contact lenses, eye make-up, or artificial eyelashes. Remove nail polish, including gel polish, and any artificial, gel, or acrylic nails if possible. Remove all jewelry including rings and body piercing jewelry.     Wear causal clothing that is easy to take on and off. Consider your type of surgery.    Keep any valuables, jewelry, piercings at home. Please bring any specially ordered equipment (sling, braces) if indicated.    Arrange for a responsible person to drive you to and from the hospital on the day of your surgery. Please confirm the visitor policy for the day of your procedure when you receive your phone call with an arrival time.     Call the surgeon's office with any new illnesses, exposures, or additional questions prior to surgery.    Please reference your “My Surgical Experience Booklet” for additional information to prepare for your upcoming surgery.    Will follow instructions from office for enema.     Pt. Verbalized an understanding of all instructions reviewed and offers no concerns at this time.Pt. Verbalized an understanding of all instructions reviewed and offers no concerns at this time.

## 2024-03-18 ENCOUNTER — RA CDI HCC (OUTPATIENT)
Dept: OTHER | Facility: HOSPITAL | Age: 59
End: 2024-03-18

## 2024-03-20 ENCOUNTER — PATIENT MESSAGE (OUTPATIENT)
Dept: FAMILY MEDICINE CLINIC | Facility: CLINIC | Age: 59
End: 2024-03-20

## 2024-03-22 DIAGNOSIS — Z00.6 ENCOUNTER FOR EXAMINATION FOR NORMAL COMPARISON OR CONTROL IN CLINICAL RESEARCH PROGRAM: ICD-10-CM

## 2024-03-25 ENCOUNTER — OFFICE VISIT (OUTPATIENT)
Dept: FAMILY MEDICINE CLINIC | Facility: CLINIC | Age: 59
End: 2024-03-25
Payer: COMMERCIAL

## 2024-03-25 VITALS
TEMPERATURE: 98.2 F | OXYGEN SATURATION: 95 % | SYSTOLIC BLOOD PRESSURE: 124 MMHG | BODY MASS INDEX: 29.89 KG/M2 | HEART RATE: 102 BPM | DIASTOLIC BLOOD PRESSURE: 78 MMHG | WEIGHT: 186 LBS | HEIGHT: 66 IN | RESPIRATION RATE: 16 BRPM

## 2024-03-25 DIAGNOSIS — M16.12 PRIMARY OSTEOARTHRITIS OF LEFT HIP: ICD-10-CM

## 2024-03-25 DIAGNOSIS — B35.1 ONYCHOMYCOSIS: ICD-10-CM

## 2024-03-25 DIAGNOSIS — G25.81 RESTLESS LEG SYNDROME: ICD-10-CM

## 2024-03-25 DIAGNOSIS — M48.061 SPINAL STENOSIS OF LUMBAR REGION, UNSPECIFIED WHETHER NEUROGENIC CLAUDICATION PRESENT: ICD-10-CM

## 2024-03-25 DIAGNOSIS — E66.09 CLASS 1 OBESITY DUE TO EXCESS CALORIES WITHOUT SERIOUS COMORBIDITY WITH BODY MASS INDEX (BMI) OF 30.0 TO 30.9 IN ADULT: ICD-10-CM

## 2024-03-25 DIAGNOSIS — Z01.818 PRE-OPERATIVE EXAM: Primary | ICD-10-CM

## 2024-03-25 PROCEDURE — 99214 OFFICE O/P EST MOD 30 MIN: CPT | Performed by: FAMILY MEDICINE

## 2024-03-25 NOTE — PROGRESS NOTES
Name: Jen Clark      : 1965      MRN: [id number]  Encounter Provider: Diana Gallagher MD  Encounter Date: 3/25/2024   Encounter department: [facility]    Assessment & Plan     1. Pre-operative exam  Comments:  Patient is acceptable risk for planned left ERICK on 2024.  Orders:  -     Ambulatory referral to [facility]  -     POCT ECG    2. Primary osteoarthritis of left hip  Assessment & Plan:  Arthritic hip pain, quite burdensome.  Pending left anterior and total hip arthroplasty on .    Orders:  -     Ambulatory referral to Family Practice    3. Spinal stenosis of lumbar region, unspecified whether neurogenic claudication present  Assessment & Plan:  Patient follows with pain management and physical therapy.  She remains on gabapentin 300 mg nightly    Orders:  -     Ambulatory referral to Family Practice    4. Onychomycosis  -     Ambulatory referral to Podiatry; Future    5. Restless leg syndrome  Assessment & Plan:  Doing well on Mirapex and as needed tizanidine      6. Class 1 obesity due to excess calories without serious comorbidity with body mass index (BMI) of 30.0 to 30.9 in adult  Assessment & Plan:  Patient's ability to exercise has been somewhat limited lately due to persistent severe arthritic hip pain and chronic low back pain.  She is hopeful to start with regular physical activity after her hip replacement.  She remains in physical therapy.  Plan to start Wegovy injections once cleared by surgical team.             Subjective     Preop evaluation prior to left total hip replacement on  by Dr. Gallegos at [facility]  Patient offers no complaints of chest pain, palpitations, shortness of breath or dizziness.  Results of preadmission testing reviewed.  Hemoglobin A1c is 5.9%.  Patient is hopeful to start Wegovy for weight loss after her hip surgery once cleared by orthopedic team.    Current medications updated.    EKG performed in the  office today.    Patient is complaining of toenail deformity, seen by podiatry in the past, toenail is thick, lack of improvement with current therapy.  She would like to proceed with treatment if possible.      Review of Systems   Constitutional: Negative.    HENT: Negative.     Eyes: Negative.    Respiratory: Negative.     Cardiovascular: Negative.    Gastrointestinal: Negative.    Endocrine: Negative.    Genitourinary: Negative.    Musculoskeletal:  Positive for arthralgias.   Allergic/Immunologic: Negative.    Neurological: Negative.    Hematological: Negative.    Psychiatric/Behavioral: Negative.         Past Medical History:   Diagnosis Date   • Arthralgia of right acromioclavicular joint    • Lyme disease    • Postmenopausal bleeding    • Situational anxiety    • Small bowel obstruction (HCC)    • Strain of deltoid muscle, left, initial encounter      Past Surgical History:   Procedure Laterality Date   • COLONOSCOPY  2013   • DILATION AND CURETTAGE OF UTERUS     • OVARIAN CYST REMOVAL  1995   • NM LAPS ABD PRTM&OMENTUM DX W/WO SPEC BR/WA SPX N/A 2/21/2021    Procedure: LAPAROSCOPY DIAGNOSTIC, exploratory laparotomy, lysis of adhesions, REDUCTION OF INTERNAL HERNIA;  Surgeon: Cris Luevano MD;  Location: [facility];  Service: General     Family History   Problem Relation Age of Onset   • Atrial fibrillation Mother    • Hyperlipidemia Mother    • Cancer Mother    • Heart disease Father    • Hypertension Father    • Hyperlipidemia Father    • Diabetes Father    • No Known Problems Sister    • No Known Problems Maternal Grandmother    • Prostate cancer Maternal Grandfather 50        Richard Marie   • No Known Problems Paternal Grandmother    • Diabetes Brother    • No Known Problems Maternal Aunt    • No Known Problems Maternal Aunt    • No Known Problems Maternal Aunt    • Prostate cancer Maternal Uncle 59   • Breast cancer Paternal Aunt 35   • No Known Problems Paternal Aunt      Social History      Socioeconomic History   • Marital status: /Civil Union     Spouse name: None   • Number of children: None   • Years of education: None   • Highest education level: None   Occupational History   • None   Tobacco Use   • Smoking status: Never   • Smokeless tobacco: Never   Vaping Use   • Vaping status: Never Used   Substance and Sexual Activity   • Alcohol use: Yes     Alcohol/week: 2.0 standard drinks of alcohol     Types: 2 Cans of beer per week     Comment: occasional   • Drug use: Never   • Sexual activity: Yes     Partners: Male     Birth control/protection: Post-menopausal   Other Topics Concern   • None   Social History Narrative   • None     Social Determinants of Health     Financial Resource Strain: Not on file   Food Insecurity: Not on file   Transportation Needs: Not on file   Physical Activity: Not on file   Stress: Not on file   Social Connections: Not on file   Intimate Partner Violence: Not on file   Housing Stability: Not on file     Current Outpatient Medications on File Prior to Visit   Medication Sig   • acetaminophen (TYLENOL) 650 mg CR tablet Take 650 mg by mouth every 8 (eight) hours as needed for mild pain   • ascorbic Acid (VITAMIN C) 500 MG CPCR Take 1 capsule (500 mg total) by mouth 2 (two) times a day   • Calcium Carb-Cholecalciferol 1000-800 MG-UNIT TABS Take by mouth daily     • cholecalciferol (VITAMIN D3) 1,000 units tablet Take 1 tablet (1,000 Units total) by mouth daily   • ferrous sulfate 324 (65 Fe) mg Take 1 tablet (324 mg total) by mouth daily before breakfast   • folic acid (FOLVITE) 1 mg tablet Take 1 tablet (1 mg total) by mouth daily   • gabapentin (NEURONTIN) 300 mg capsule Take 1 capsule at bedtime   • Insulin Pen Needle 34G X 3.5 MM MISC Use with Zepbound injections once a week   • Iron-Vitamin C 100-250 MG TABS Take by mouth 3 (three) times a week   • KRILL OIL PO Take 1 capsule by mouth daily   • LORazepam (ATIVAN) 0.5 mg tablet Take 1 tablet (0.5 mg total) by  "mouth as needed for anxiety (Launch) Take as need for dental work   • Multiple Vitamin (MULTI-VITAMIN DAILY) TABS Take 1 tablet by mouth daily   • NON FORMULARY Take 1 each by mouth daily NUTRIM OAT POWDER-SPRINKLE OVER FOOD ONCE A DAY   • pramipexole (MIRAPEX) 0.25 mg tablet Take 1 tablet once a day after dinner   • Sodium Fluoride 5000 PPM 1.1 % PSTE    • tiZANidine (ZANAFLEX) 2 mg tablet Take 1-2 tabs qhs   • tretinoin (RETIN-A) 0.025 % cream Apply thin film topically once daily   • valACYclovir (VALTREX) 1,000 mg tablet Take 2 tablets twice a day as needed for cold sore x 1 day.   • zinc sulfate (ZINCATE) 220 mg capsule Take 1 capsule (220 mg total) by mouth daily   • [DISCONTINUED] fluconazole (DIFLUCAN) 150 mg tablet TAKE ONE TABLET BY MOUTH AS A ONE-TIME DOSE   • celecoxib (CeleBREX) 200 mg capsule TAKE 1 CAPSULE TWICE A DAY (Patient not taking: Reported on 3/25/2024)     Allergies   Allergen Reactions   • Codeine Dizziness     Immunization History   Administered Date(s) Administered   • COVID-19 MODERNA VACC 0.25 ML IM BOOSTER 12/03/2021   • COVID-19 MODERNA VACC 0.5 ML IM 12/30/2020, 01/26/2021, 12/03/2021   • COVID-19 Moderna Vac BIVALENT 12 Yr+ IM 0.5 ML 01/14/2023   • COVID-19 Pfizer mRNA vacc PF phil-sucrose 12 yr and older (Comirnaty) 09/30/2023   • Hep B, Adolescent or Pediatric 06/27/2003, 07/30/2003, 09/15/2004   • INFLUENZA 11/29/1999, 11/12/2003, 11/13/2004, 10/11/2020, 10/06/2022   • Lyme Disease 05/19/1999, 06/30/1999, 05/22/2000   • MMR 06/27/2003   • Td (adult), adsorbed 06/27/2003   • Tdap 06/16/2017, 07/01/2021       Objective     /78   Pulse 102   Temp 98.2 °F (36.8 °C) (Temporal)   Resp 16   Ht 5' 6\" (1.676 m)   Wt 84.4 kg (186 lb)   LMP 06/22/2016   SpO2 95%   BMI 30.02 kg/m²     Physical Exam  Vitals and nursing note reviewed.   Constitutional:       General: She is not in acute distress.     Appearance: Normal appearance. She is well-developed. She is not ill-appearing. "   HENT:      Head: Normocephalic and atraumatic.   Eyes:      Conjunctiva/sclera: Conjunctivae normal.   Neck:      Thyroid: No thyromegaly.      Vascular: No carotid bruit.   Cardiovascular:      Rate and Rhythm: Normal rate and regular rhythm.      Heart sounds: Normal heart sounds. No murmur heard.  Pulmonary:      Effort: Pulmonary effort is normal. No respiratory distress.      Breath sounds: Normal breath sounds. No wheezing.   Abdominal:      General: Bowel sounds are normal. There is no abdominal bruit.      Palpations: Abdomen is soft.   Musculoskeletal:         General: Normal range of motion.      Cervical back: Neck supple.      Right lower leg: No edema.      Left lower leg: No edema.   Neurological:      General: No focal deficit present.      Mental Status: She is alert and oriented to person, place, and time.      Cranial Nerves: No cranial nerve deficit.      Coordination: Coordination normal.   Psychiatric:         Mood and Affect: Mood normal.         Behavior: Behavior normal.         Thought Content: Thought content normal.       Results for orders placed or performed in visit on 03/25/24   POCT ECG    Impression    Normal sinus rhythm, incomplete right bundle branch block, 91 bpm, no acute ischemic changes       Diana Gallagher MD

## 2024-03-25 NOTE — H&P (VIEW-ONLY)
Name: Jen Clark      : 1965      MRN: [id number]  Encounter Provider: Diana Gallagher MD  Encounter Date: 3/25/2024   Encounter department: [facility]    Assessment & Plan     1. Pre-operative exam  Comments:  Patient is acceptable risk for planned left ERICK on 2024.  Orders:  -     Ambulatory referral to [facility]  -     POCT ECG    2. Primary osteoarthritis of left hip  Assessment & Plan:  Arthritic hip pain, quite burdensome.  Pending left anterior and total hip arthroplasty on .    Orders:  -     Ambulatory referral to Family Practice    3. Spinal stenosis of lumbar region, unspecified whether neurogenic claudication present  Assessment & Plan:  Patient follows with pain management and physical therapy.  She remains on gabapentin 300 mg nightly    Orders:  -     Ambulatory referral to Family Practice    4. Onychomycosis  -     Ambulatory referral to Podiatry; Future    5. Restless leg syndrome  Assessment & Plan:  Doing well on Mirapex and as needed tizanidine      6. Class 1 obesity due to excess calories without serious comorbidity with body mass index (BMI) of 30.0 to 30.9 in adult  Assessment & Plan:  Patient's ability to exercise has been somewhat limited lately due to persistent severe arthritic hip pain and chronic low back pain.  She is hopeful to start with regular physical activity after her hip replacement.  She remains in physical therapy.  Plan to start Wegovy injections once cleared by surgical team.             Subjective     Preop evaluation prior to left total hip replacement on  by Dr[facility] Gallegos at [facility]  Patient offers no complaints of chest pain, palpitations, shortness of breath or dizziness.  Results of preadmission testing reviewed.  Hemoglobin A1c is 5.9%.  Patient is hopeful to start Wegovy for weight loss after her hip surgery once cleared by orthopedic team.    Current medications updated.    EKG performed in the  office today.    Patient is complaining of toenail deformity, seen by podiatry in the past, toenail is thick, lack of improvement with current therapy.  She would like to proceed with treatment if possible.      Review of Systems   Constitutional: Negative.    HENT: Negative.     Eyes: Negative.    Respiratory: Negative.     Cardiovascular: Negative.    Gastrointestinal: Negative.    Endocrine: Negative.    Genitourinary: Negative.    Musculoskeletal:  Positive for arthralgias.   Allergic/Immunologic: Negative.    Neurological: Negative.    Hematological: Negative.    Psychiatric/Behavioral: Negative.         Past Medical History:   Diagnosis Date   • Arthralgia of right acromioclavicular joint    • Lyme disease    • Postmenopausal bleeding    • Situational anxiety    • Small bowel obstruction (HCC)    • Strain of deltoid muscle, left, initial encounter      Past Surgical History:   Procedure Laterality Date   • COLONOSCOPY  2013   • DILATION AND CURETTAGE OF UTERUS     • OVARIAN CYST REMOVAL  1995   • TX LAPS ABD PRTM&OMENTUM DX W/WO SPEC BR/WA SPX N/A 2/21/2021    Procedure: LAPAROSCOPY DIAGNOSTIC, exploratory laparotomy, lysis of adhesions, REDUCTION OF INTERNAL HERNIA;  Surgeon: Cris Luevano MD;  Location: [facility];  Service: General     Family History   Problem Relation Age of Onset   • Atrial fibrillation Mother    • Hyperlipidemia Mother    • Cancer Mother    • Heart disease Father    • Hypertension Father    • Hyperlipidemia Father    • Diabetes Father    • No Known Problems Sister    • No Known Problems Maternal Grandmother    • Prostate cancer Maternal Grandfather 50        Richard Marie   • No Known Problems Paternal Grandmother    • Diabetes Brother    • No Known Problems Maternal Aunt    • No Known Problems Maternal Aunt    • No Known Problems Maternal Aunt    • Prostate cancer Maternal Uncle 59   • Breast cancer Paternal Aunt 35   • No Known Problems Paternal Aunt      Social History      Socioeconomic History   • Marital status: /Civil Union     Spouse name: None   • Number of children: None   • Years of education: None   • Highest education level: None   Occupational History   • None   Tobacco Use   • Smoking status: Never   • Smokeless tobacco: Never   Vaping Use   • Vaping status: Never Used   Substance and Sexual Activity   • Alcohol use: Yes     Alcohol/week: 2.0 standard drinks of alcohol     Types: 2 Cans of beer per week     Comment: occasional   • Drug use: Never   • Sexual activity: Yes     Partners: Male     Birth control/protection: Post-menopausal   Other Topics Concern   • None   Social History Narrative   • None     Social Determinants of Health     Financial Resource Strain: Not on file   Food Insecurity: Not on file   Transportation Needs: Not on file   Physical Activity: Not on file   Stress: Not on file   Social Connections: Not on file   Intimate Partner Violence: Not on file   Housing Stability: Not on file     Current Outpatient Medications on File Prior to Visit   Medication Sig   • acetaminophen (TYLENOL) 650 mg CR tablet Take 650 mg by mouth every 8 (eight) hours as needed for mild pain   • ascorbic Acid (VITAMIN C) 500 MG CPCR Take 1 capsule (500 mg total) by mouth 2 (two) times a day   • Calcium Carb-Cholecalciferol 1000-800 MG-UNIT TABS Take by mouth daily     • cholecalciferol (VITAMIN D3) 1,000 units tablet Take 1 tablet (1,000 Units total) by mouth daily   • ferrous sulfate 324 (65 Fe) mg Take 1 tablet (324 mg total) by mouth daily before breakfast   • folic acid (FOLVITE) 1 mg tablet Take 1 tablet (1 mg total) by mouth daily   • gabapentin (NEURONTIN) 300 mg capsule Take 1 capsule at bedtime   • Insulin Pen Needle 34G X 3.5 MM MISC Use with Zepbound injections once a week   • Iron-Vitamin C 100-250 MG TABS Take by mouth 3 (three) times a week   • KRILL OIL PO Take 1 capsule by mouth daily   • LORazepam (ATIVAN) 0.5 mg tablet Take 1 tablet (0.5 mg total) by  "mouth as needed for anxiety (Launch) Take as need for dental work   • Multiple Vitamin (MULTI-VITAMIN DAILY) TABS Take 1 tablet by mouth daily   • NON FORMULARY Take 1 each by mouth daily NUTRIM OAT POWDER-SPRINKLE OVER FOOD ONCE A DAY   • pramipexole (MIRAPEX) 0.25 mg tablet Take 1 tablet once a day after dinner   • Sodium Fluoride 5000 PPM 1.1 % PSTE    • tiZANidine (ZANAFLEX) 2 mg tablet Take 1-2 tabs qhs   • tretinoin (RETIN-A) 0.025 % cream Apply thin film topically once daily   • valACYclovir (VALTREX) 1,000 mg tablet Take 2 tablets twice a day as needed for cold sore x 1 day.   • zinc sulfate (ZINCATE) 220 mg capsule Take 1 capsule (220 mg total) by mouth daily   • [DISCONTINUED] fluconazole (DIFLUCAN) 150 mg tablet TAKE ONE TABLET BY MOUTH AS A ONE-TIME DOSE   • celecoxib (CeleBREX) 200 mg capsule TAKE 1 CAPSULE TWICE A DAY (Patient not taking: Reported on 3/25/2024)     Allergies   Allergen Reactions   • Codeine Dizziness     Immunization History   Administered Date(s) Administered   • COVID-19 MODERNA VACC 0.25 ML IM BOOSTER 12/03/2021   • COVID-19 MODERNA VACC 0.5 ML IM 12/30/2020, 01/26/2021, 12/03/2021   • COVID-19 Moderna Vac BIVALENT 12 Yr+ IM 0.5 ML 01/14/2023   • COVID-19 Pfizer mRNA vacc PF phil-sucrose 12 yr and older (Comirnaty) 09/30/2023   • Hep B, Adolescent or Pediatric 06/27/2003, 07/30/2003, 09/15/2004   • INFLUENZA 11/29/1999, 11/12/2003, 11/13/2004, 10/11/2020, 10/06/2022   • Lyme Disease 05/19/1999, 06/30/1999, 05/22/2000   • MMR 06/27/2003   • Td (adult), adsorbed 06/27/2003   • Tdap 06/16/2017, 07/01/2021       Objective     /78   Pulse 102   Temp 98.2 °F (36.8 °C) (Temporal)   Resp 16   Ht 5' 6\" (1.676 m)   Wt 84.4 kg (186 lb)   LMP 06/22/2016   SpO2 95%   BMI 30.02 kg/m²     Physical Exam  Vitals and nursing note reviewed.   Constitutional:       General: She is not in acute distress.     Appearance: Normal appearance. She is well-developed. She is not ill-appearing. "   HENT:      Head: Normocephalic and atraumatic.   Eyes:      Conjunctiva/sclera: Conjunctivae normal.   Neck:      Thyroid: No thyromegaly.      Vascular: No carotid bruit.   Cardiovascular:      Rate and Rhythm: Normal rate and regular rhythm.      Heart sounds: Normal heart sounds. No murmur heard.  Pulmonary:      Effort: Pulmonary effort is normal. No respiratory distress.      Breath sounds: Normal breath sounds. No wheezing.   Abdominal:      General: Bowel sounds are normal. There is no abdominal bruit.      Palpations: Abdomen is soft.   Musculoskeletal:         General: Normal range of motion.      Cervical back: Neck supple.      Right lower leg: No edema.      Left lower leg: No edema.   Neurological:      General: No focal deficit present.      Mental Status: She is alert and oriented to person, place, and time.      Cranial Nerves: No cranial nerve deficit.      Coordination: Coordination normal.   Psychiatric:         Mood and Affect: Mood normal.         Behavior: Behavior normal.         Thought Content: Thought content normal.       Results for orders placed or performed in visit on 03/25/24   POCT ECG    Impression    Normal sinus rhythm, incomplete right bundle branch block, 91 bpm, no acute ischemic changes       Diana Gallagher MD

## 2024-03-26 ENCOUNTER — EVALUATION (OUTPATIENT)
Dept: PHYSICAL THERAPY | Facility: CLINIC | Age: 59
End: 2024-03-26
Payer: COMMERCIAL

## 2024-03-26 ENCOUNTER — TELEPHONE (OUTPATIENT)
Dept: OBGYN CLINIC | Facility: HOSPITAL | Age: 59
End: 2024-03-26

## 2024-03-26 DIAGNOSIS — M16.12 PRIMARY OSTEOARTHRITIS OF LEFT HIP: Primary | ICD-10-CM

## 2024-03-26 DIAGNOSIS — Z01.818 PRE-OPERATIVE EXAM: ICD-10-CM

## 2024-03-26 DIAGNOSIS — M25.552 LEFT HIP PAIN: ICD-10-CM

## 2024-03-26 PROCEDURE — 93000 ELECTROCARDIOGRAM COMPLETE: CPT | Performed by: FAMILY MEDICINE

## 2024-03-26 PROCEDURE — 97161 PT EVAL LOW COMPLEX 20 MIN: CPT | Performed by: PHYSICAL THERAPIST

## 2024-03-26 NOTE — PROGRESS NOTES
PT Evaluation     Today's date: 3/26/2024  Patient name: Jen Clark  : 1965  MRN: [id number]  Referring provider: Blaze Gallegos DO  Dx:   Encounter Diagnosis     ICD-10-CM    1. Primary osteoarthritis of left hip  M16.12 Ambulatory referral to Physical Therapy      2. Left hip pain  M25.552 Ambulatory referral to Physical Therapy      3. Pre-operative exam  Z01.818 Ambulatory referral to Physical Therapy          Start Time: 1715  Stop Time: 1800  Total time in clinic (min): 45 minutes    Assessment  Assessment details: Discussed DOS and patient's questions were answered to patient's satisfaction. Mobility/ROM results per above. Strength results per above. Balance/Gait (including Timed Up & Go) per above. Home Assessment was reviewed with patient. Home Preparation Checklist was reviewed with patient including identification of care partner and encouragement of single level set-up. Post-operative pain management expectations discussed to the patient's satisfaction. Post-operative gait training for level ground, stairs, and car transfers was performed. Patient demonstrated competence with immediate post-operative home exercise program.    Impairments: abnormal gait, abnormal muscle firing, abnormal muscle tone, abnormal or restricted ROM, abnormal movement, impaired physical strength, lacks appropriate home exercise program, pain with function and poor body mechanics  Understanding of Dx/Px/POC: good   Prognosis: good    Plan  Patient would benefit from: skilled physical therapy  Planned modality interventions: cryotherapy  Planned therapy interventions: joint mobilization, manual therapy, patient education, postural training, strengthening, stretching, therapeutic activities, therapeutic exercise, home exercise program, neuromuscular re-education, flexibility, functional ROM exercises, balance and gait training  Frequency: 2-3x.  Duration in weeks: 12  Treatment plan discussed with:  patient      Subjective Evaluation    History of Present Illness  Mechanism of injury: Patient reports that she has been having hip pain for the past year. Pain lateral hip and deep inside. Does report bilateral tingling in legs at times at night time and when sitting for long periods of time. Occasional tingling RUE at times too at night. Step to stair navigation at home. Pain in hip worse with walking and walking at normal speed, as well as performing transitions. Difficulty donning socks/shoe. Spouse and son will be able to assist at home.     Has walker, cane, shower seat, and raise commode.          Recurrent probem    Quality of life: good    Patient Goals  Patient goals for therapy: decreased pain, increased motion, increased strength and independence with ADLs/IADLs  Patient goal: walk without pain  Pain  Current pain ratin  At worst pain ratin  Quality: dull ache and grinding  Relieving factors: medications  Aggravating factors: stair climbing, standing and walking  Progression: worsening    Social Support  Steps to enter house: yes (no railings from garage, bilateral railings entering from driveway, 8-10 steps)  3  Stairs in house: yes   Lives in: multiple-level home    Treatments  Previous treatment: injection treatment      Short Term: FROM DOS scheduled 24  Pt will report decreased levels of pain by at least 2 subjective ratings in 4 weeks  Pt will demonstrate improved ROM by at least 10 degrees in 4 weeks  Pt will demonstrate improved strength by 1/2 grade in 4 weeks.  Pt will be able to ambulate without AD in 4 weeks  Long Term:   Pt will be independent in their HEP in 8 weeks  Pt will be pain free with IADL's in 8 weeks.  Pt will demonstrate 5/5 MMT in 8 weeks.  Pt will be able to ambulate> 20 minutes without limitation in 8 weeks.  PT will be able to perform reciprocal stair navigation without issue in 8 weeks.     Objective    GAIT: Left gait antalgia   Squat assess: 50% depth right  lateral lean  TUG: < 13 sec no AD           MMT         AROM          PROM    Hip         R          L         R        L          R         L   Flex. 5 4p!   105 85   Extn. 4 3+       Abd. 4 3 35 25     Add.         IR. 4 4   18 8   ER. 4 4   25 10                 MMT    Knee      R          L   Flex. 5 5   Extn. 5 5                     MMT    Ankle         R          L   PF 5 5   DF. 5 5        Hip:  scour=  +   mabel =   + Fadir=  +   Straight leg raise= painful     Insurance:  AMA/CMS Eval/ Re-eval POC expires FOTO Auth #/ Referral # Total    Start date  Expiration date Extension  Visit limitation?  PT only or  PT+OT? Co-Insurance   CMS 3.26.24 6.11.24  needed                            AUTH #:  Date 3.26              Authed: Used 1               Remaining                    Precautions: decreased hearing, hx of Lyme disease  Patient provided verbal consent to treatment plan and recommended interventions.    DOS: Left ERICK 4/1/24    Manuals 3.26            visit 1                                                   Neuro Re-Ed                                                                                                        Ther Ex             LAQ             SAQ             Supine hip abd             Quad set             Glute set             Ankle pumps                                                                                           Ther Activity             Pt ed FB                         Gait Training             Stairs/car FB                         Modalities

## 2024-03-26 NOTE — TELEPHONE ENCOUNTER
Caller: Patient    Doctor: Rosy    Reason for call: Patient has ERICK on 4/1/24.  Patient is asking if she can use a Lidocaine cream before sx for pain mgmt?  Please advise.    Call back#: [phone]

## 2024-03-27 NOTE — ASSESSMENT & PLAN NOTE
Patient's ability to exercise has been somewhat limited lately due to persistent severe arthritic hip pain and chronic low back pain.  She is hopeful to start with regular physical activity after her hip replacement.  She remains in physical therapy.  Plan to start Wegovy injections once cleared by surgical team.

## 2024-03-27 NOTE — ASSESSMENT & PLAN NOTE
Arthritic hip pain, quite burdensome.  Pending left anterior and total hip arthroplasty on April 1.

## 2024-03-27 NOTE — ASSESSMENT & PLAN NOTE
Patient follows with pain management and physical therapy.  She remains on gabapentin 300 mg nightly

## 2024-03-28 ENCOUNTER — ANESTHESIA EVENT (OUTPATIENT)
Dept: PERIOP | Facility: HOSPITAL | Age: 59
End: 2024-03-28
Payer: COMMERCIAL

## 2024-03-29 NOTE — DISCHARGE INSTR - AVS FIRST PAGE
Direct Anterior Total Hip Replacement     WHAT YOU SHOULD KNOW:   Minimally invasive total hip replacement is surgery to replace a damaged hip joint with an implant.          AFTER YOU LEAVE:     Medicines:   Anticoagulants  are a type of blood thinner medicine that helps prevent clots. Clots can cause strokes, heart attacks, and death. These medicines may cause you to bleed or bruise more easily.  You will be on full-dose aspirin 325mg twice a day for 6 weeks.    Watch for bleeding from your gums or nose. Watch for blood in your urine and bowel movements. Use a soft washcloth and a soft toothbrush. If you shave, use an electric razor. Avoid activities that can cause bruising or bleeding.    Tell your healthcare provider about all medicines you take because many medicines cannot be used with anticoagulants. Do not start or stop any medicines unless your healthcare provider tells you to. Tell your dentist and other healthcare providers that you take anticoagulants. Wear a bracelet or necklace that says you take this medicine.     NSAIDs  help decrease swelling, pain, and fever. This medicine is available with or without a doctor's order. NSAIDs can cause stomach bleeding or kidney problems in certain people. If you take blood thinner medicine, always ask your orthopedist if NSAIDs are safe for you. Always read the medicine label and follow directions.  You will be given Celebrex 100 mg to take twice a day for the first 2 weeks postoperatively.    Prescription pain medicine  You should take 975mg of over the counter acetaminophen (tylenol) every 8 hours for baseline pain control. You will also be given oxycodone 5mg tablets. Take 1-2 by mouth every 6 hours as needed for pain.     Stool softeners  make it easier for you to have a bowel movement. You may need this medicine to treat or prevent constipation.  You will be given Colace 100mg to take twice a day    Take your medicine as directed.  Contact your orthopedist if  you think your medicine is not helping or if you have side effects. Tell him if you are allergic to any medicine. Keep a list of the medicines, vitamins, and herbs you take. Include the amounts, and when and why you take them. Bring the list or the pill bottles to follow-up visits. Carry your medicine list with you in case of an emergency.    Follow up with your orthopedist as directed:  You may need to return to have your wound checked. Write down your questions so you remember to ask them during your visits.  Please schedule an appointment to see Dr. Gallegos 2 weeks after surgery.    Physical therapy:  A physical therapist teaches you exercises to help improve movement and strength, and to decrease pain.  You will begin outpatient physical therapy later this week as planned.    Wound Care:  Please leave the Mepilex dressing in place for 7 days after surgery. After 7 days, you may remove the dressing and leave the incision open to air.  If the incision is uncomfortable under clothing after the Mepilex is removed, you may cover it with a a dry sterile dressing, but should remain dry at all times. Once the dressing is off, please keep the incision dry for another week until your follow up appointment    Self-care:   Use a cane, walker, or crutches as directed.  These devices will help decrease your risk of falling. Your therapist will guide you.     Use ice:  Ice helps decrease swelling and pain. Ice may also help prevent tissue damage. Use an ice pack or put crushed ice in a plastic bag. Cover it with a towel, and place it on your knee for 15 to 20 minutes every hour as directed.    Prevent dislocation of your hip implant:      Avoid extremes of external rotation of the leg    Contact your orthopedist if:  You have a fever over 101.0°F.    You have trouble moving or bending your joint.    You have increased pain and swelling in your joint, even after you take pain medicine.    You have back pain or lower leg pain  when you bend your foot upwards.    You have questions or concerns about your condition or care.    Blood soaks through/saturates your bandage.    Your incision comes apart.    Your incision is red, swollen, or draining pus.    You cannot walk or move your joint, or the limb is numb.    Your leg feels warm, tender, and painful. It may look swollen and red.     Seek immediate care or call 911 if:   You feel like you are going to faint.    You have a seizure or feel confused.     You feel lightheaded, short of breath, and have chest pain.    You have chest pain when you take a deep breath or cough. You may cough up blood.      © 2014 [employer]. Information is for End User's use only and may not be sold, redistributed or otherwise used for commercial purposes. All illustrations and images included in CareNotes® are the copyrighted property of [employer], [employer]. or [employer].  The above information is an  only. It is not intended as medical advice for individual conditions or treatments. Talk to your doctor, nurse or pharmacist before following any medical regimen to see if it is safe and effective for you.

## 2024-04-01 ENCOUNTER — HOSPITAL ENCOUNTER (OUTPATIENT)
Facility: HOSPITAL | Age: 59
Setting detail: OUTPATIENT SURGERY
Discharge: HOME | End: 2024-04-01
Attending: ORTHOPAEDIC SURGERY | Admitting: ORTHOPAEDIC SURGERY
Payer: COMMERCIAL

## 2024-04-01 ENCOUNTER — ANESTHESIA (OUTPATIENT)
Dept: PERIOP | Facility: HOSPITAL | Age: 59
End: 2024-04-01
Payer: COMMERCIAL

## 2024-04-01 ENCOUNTER — APPOINTMENT (OUTPATIENT)
Dept: RADIOLOGY | Facility: HOSPITAL | Age: 59
End: 2024-04-01
Payer: COMMERCIAL

## 2024-04-01 ENCOUNTER — TELEPHONE (OUTPATIENT)
Dept: OBGYN CLINIC | Facility: HOSPITAL | Age: 59
End: 2024-04-01

## 2024-04-01 VITALS
HEART RATE: 96 BPM | WEIGHT: 183.86 LBS | TEMPERATURE: 98 F | OXYGEN SATURATION: 95 % | SYSTOLIC BLOOD PRESSURE: 131 MMHG | RESPIRATION RATE: 16 BRPM | BODY MASS INDEX: 29.55 KG/M2 | DIASTOLIC BLOOD PRESSURE: 82 MMHG | HEIGHT: 66 IN

## 2024-04-01 DIAGNOSIS — Z96.642 STATUS POST TOTAL REPLACEMENT OF LEFT HIP: Primary | ICD-10-CM

## 2024-04-01 DIAGNOSIS — Z96.642 STATUS POST TOTAL REPLACEMENT OF LEFT HIP: ICD-10-CM

## 2024-04-01 LAB
GLUCOSE SERPL-MCNC: 96 MG/DL (ref 65–140)
GLUCOSE SERPL-MCNC: 98 MG/DL (ref 65–140)

## 2024-04-01 PROCEDURE — C1776 JOINT DEVICE (IMPLANTABLE): HCPCS | Performed by: ORTHOPAEDIC SURGERY

## 2024-04-01 PROCEDURE — 97116 GAIT TRAINING THERAPY: CPT

## 2024-04-01 PROCEDURE — 0054T BONE SRGRY CMPTR FLUOR IMAGE: CPT | Performed by: ORTHOPAEDIC SURGERY

## 2024-04-01 PROCEDURE — 82948 REAGENT STRIP/BLOOD GLUCOSE: CPT

## 2024-04-01 PROCEDURE — 97166 OT EVAL MOD COMPLEX 45 MIN: CPT

## 2024-04-01 PROCEDURE — 27130 TOTAL HIP ARTHROPLASTY: CPT | Performed by: PHYSICIAN ASSISTANT

## 2024-04-01 PROCEDURE — 73501 X-RAY EXAM HIP UNI 1 VIEW: CPT

## 2024-04-01 PROCEDURE — 97163 PT EVAL HIGH COMPLEX 45 MIN: CPT

## 2024-04-01 PROCEDURE — 27130 TOTAL HIP ARTHROPLASTY: CPT | Performed by: ORTHOPAEDIC SURGERY

## 2024-04-01 PROCEDURE — C9290 INJ, BUPIVACAINE LIPOSOME: HCPCS | Performed by: ANESTHESIOLOGY

## 2024-04-01 DEVICE — ACTIS DUOFIX HIP PROSTHESIS (FEMORAL STEM 12/14 TAPER CEMENTLESS SIZE 6 HIGH COLLAR)  CE
Type: IMPLANTABLE DEVICE | Site: HIP | Status: FUNCTIONAL
Brand: ACTIS

## 2024-04-01 DEVICE — EMPHASYS ACETABULAR SHELL THREE-HOLE 50MM CEMENTLESS
Type: IMPLANTABLE DEVICE | Site: HIP | Status: FUNCTIONAL
Brand: EMPHASYS

## 2024-04-01 DEVICE — EMPHASYS POLYETHYLENE LINER AOX NEUTRAL 50MM 36MM
Type: IMPLANTABLE DEVICE | Site: HIP | Status: FUNCTIONAL
Brand: EMPHASYS

## 2024-04-01 DEVICE — BIOLOX DELTA CERAMIC FEMORAL HEAD +1.5 36MM DIA 12/14 TAPER
Type: IMPLANTABLE DEVICE | Site: HIP | Status: FUNCTIONAL
Brand: BIOLOX DELTA

## 2024-04-01 RX ORDER — OXYCODONE HCL 10 MG/1
10 TABLET, FILM COATED, EXTENDED RELEASE ORAL ONCE
Status: COMPLETED | OUTPATIENT
Start: 2024-04-01 | End: 2024-04-01

## 2024-04-01 RX ORDER — BISACODYL 10 MG
10 SUPPOSITORY, RECTAL RECTAL DAILY PRN
Status: DISCONTINUED | OUTPATIENT
Start: 2024-04-01 | End: 2024-04-01 | Stop reason: HOSPADM

## 2024-04-01 RX ORDER — FENTANYL CITRATE/PF 50 MCG/ML
25 SYRINGE (ML) INJECTION
Status: DISCONTINUED | OUTPATIENT
Start: 2024-04-01 | End: 2024-04-01 | Stop reason: HOSPADM

## 2024-04-01 RX ORDER — OXYCODONE HYDROCHLORIDE 5 MG/1
10 TABLET ORAL EVERY 4 HOURS PRN
Status: DISCONTINUED | OUTPATIENT
Start: 2024-04-01 | End: 2024-04-01 | Stop reason: HOSPADM

## 2024-04-01 RX ORDER — SODIUM CHLORIDE, SODIUM LACTATE, POTASSIUM CHLORIDE, CALCIUM CHLORIDE 600; 310; 30; 20 MG/100ML; MG/100ML; MG/100ML; MG/100ML
125 INJECTION, SOLUTION INTRAVENOUS CONTINUOUS
Status: DISCONTINUED | OUTPATIENT
Start: 2024-04-01 | End: 2024-04-01

## 2024-04-01 RX ORDER — ACETAMINOPHEN 325 MG/1
975 TABLET ORAL ONCE
Status: COMPLETED | OUTPATIENT
Start: 2024-04-01 | End: 2024-04-01

## 2024-04-01 RX ORDER — GLYCOPYRROLATE 0.2 MG/ML
INJECTION INTRAMUSCULAR; INTRAVENOUS AS NEEDED
Status: DISCONTINUED | OUTPATIENT
Start: 2024-04-01 | End: 2024-04-01

## 2024-04-01 RX ORDER — ASPIRIN 325 MG
325 TABLET ORAL 2 TIMES DAILY
Qty: 84 TABLET | Refills: 0 | Status: SHIPPED | OUTPATIENT
Start: 2024-04-01 | End: 2024-05-13

## 2024-04-01 RX ORDER — CEFAZOLIN SODIUM 2 G/50ML
2000 SOLUTION INTRAVENOUS ONCE
Status: COMPLETED | OUTPATIENT
Start: 2024-04-01 | End: 2024-04-01

## 2024-04-01 RX ORDER — ASPIRIN 325 MG
325 TABLET ORAL 2 TIMES DAILY
Status: DISCONTINUED | OUTPATIENT
Start: 2024-04-01 | End: 2024-04-01 | Stop reason: HOSPADM

## 2024-04-01 RX ORDER — ONDANSETRON 2 MG/ML
INJECTION INTRAMUSCULAR; INTRAVENOUS AS NEEDED
Status: DISCONTINUED | OUTPATIENT
Start: 2024-04-01 | End: 2024-04-01

## 2024-04-01 RX ORDER — DOCUSATE SODIUM 100 MG/1
100 CAPSULE, LIQUID FILLED ORAL 2 TIMES DAILY
Qty: 60 CAPSULE | Refills: 0 | Status: SHIPPED | OUTPATIENT
Start: 2024-04-01

## 2024-04-01 RX ORDER — MAGNESIUM HYDROXIDE 1200 MG/15ML
LIQUID ORAL AS NEEDED
Status: DISCONTINUED | OUTPATIENT
Start: 2024-04-01 | End: 2024-04-01 | Stop reason: HOSPADM

## 2024-04-01 RX ORDER — HYDROMORPHONE HCL/PF 1 MG/ML
0.5 SYRINGE (ML) INJECTION EVERY 2 HOUR PRN
Status: DISCONTINUED | OUTPATIENT
Start: 2024-04-01 | End: 2024-04-01 | Stop reason: HOSPADM

## 2024-04-01 RX ORDER — GABAPENTIN 300 MG/1
300 CAPSULE ORAL ONCE
Status: COMPLETED | OUTPATIENT
Start: 2024-04-01 | End: 2024-04-01

## 2024-04-01 RX ORDER — BUPIVACAINE HYDROCHLORIDE 7.5 MG/ML
INJECTION, SOLUTION INTRASPINAL AS NEEDED
Status: DISCONTINUED | OUTPATIENT
Start: 2024-04-01 | End: 2024-04-01

## 2024-04-01 RX ORDER — BUPIVACAINE HYDROCHLORIDE 5 MG/ML
INJECTION, SOLUTION EPIDURAL; INTRACAUDAL
Status: DISCONTINUED | OUTPATIENT
Start: 2024-04-01 | End: 2024-04-01

## 2024-04-01 RX ORDER — ASPIRIN 325 MG
325 TABLET ORAL 2 TIMES DAILY
Qty: 84 TABLET | Refills: 0 | Status: SHIPPED | OUTPATIENT
Start: 2024-04-01 | End: 2024-04-01 | Stop reason: SDUPTHER

## 2024-04-01 RX ORDER — ACETAMINOPHEN 325 MG/1
975 TABLET ORAL EVERY 8 HOURS
Start: 2024-04-01

## 2024-04-01 RX ORDER — CEFAZOLIN SODIUM 2 G/50ML
2000 SOLUTION INTRAVENOUS EVERY 6 HOURS
Status: COMPLETED | OUTPATIENT
Start: 2024-04-01 | End: 2024-04-01

## 2024-04-01 RX ORDER — DOCUSATE SODIUM 100 MG/1
100 CAPSULE, LIQUID FILLED ORAL 2 TIMES DAILY
Qty: 60 CAPSULE | Refills: 0 | Status: SHIPPED | OUTPATIENT
Start: 2024-04-01 | End: 2024-04-01 | Stop reason: SDUPTHER

## 2024-04-01 RX ORDER — OXYCODONE HYDROCHLORIDE 5 MG/1
5 TABLET ORAL EVERY 4 HOURS PRN
Status: DISCONTINUED | OUTPATIENT
Start: 2024-04-01 | End: 2024-04-01 | Stop reason: HOSPADM

## 2024-04-01 RX ORDER — METOCLOPRAMIDE HYDROCHLORIDE 5 MG/ML
10 INJECTION INTRAMUSCULAR; INTRAVENOUS ONCE AS NEEDED
Status: DISCONTINUED | OUTPATIENT
Start: 2024-04-01 | End: 2024-04-01 | Stop reason: HOSPADM

## 2024-04-01 RX ORDER — DOCUSATE SODIUM 100 MG/1
100 CAPSULE, LIQUID FILLED ORAL 2 TIMES DAILY
Status: DISCONTINUED | OUTPATIENT
Start: 2024-04-01 | End: 2024-04-01 | Stop reason: HOSPADM

## 2024-04-01 RX ORDER — PROPOFOL 10 MG/ML
INJECTION, EMULSION INTRAVENOUS AS NEEDED
Status: DISCONTINUED | OUTPATIENT
Start: 2024-04-01 | End: 2024-04-01

## 2024-04-01 RX ORDER — TRANEXAMIC ACID 10 MG/ML
1000 INJECTION, SOLUTION INTRAVENOUS ONCE
Status: COMPLETED | OUTPATIENT
Start: 2024-04-01 | End: 2024-04-01

## 2024-04-01 RX ORDER — MAGNESIUM HYDROXIDE/ALUMINUM HYDROXICE/SIMETHICONE 120; 1200; 1200 MG/30ML; MG/30ML; MG/30ML
30 SUSPENSION ORAL EVERY 6 HOURS PRN
Status: DISCONTINUED | OUTPATIENT
Start: 2024-04-01 | End: 2024-04-01 | Stop reason: HOSPADM

## 2024-04-01 RX ORDER — ONDANSETRON 2 MG/ML
4 INJECTION INTRAMUSCULAR; INTRAVENOUS EVERY 6 HOURS PRN
Status: DISCONTINUED | OUTPATIENT
Start: 2024-04-01 | End: 2024-04-01 | Stop reason: HOSPADM

## 2024-04-01 RX ORDER — CHLORHEXIDINE GLUCONATE ORAL RINSE 1.2 MG/ML
15 SOLUTION DENTAL ONCE
Status: COMPLETED | OUTPATIENT
Start: 2024-04-01 | End: 2024-04-01

## 2024-04-01 RX ORDER — CELECOXIB 100 MG/1
200 CAPSULE ORAL 2 TIMES DAILY
Status: DISCONTINUED | OUTPATIENT
Start: 2024-04-01 | End: 2024-04-01 | Stop reason: HOSPADM

## 2024-04-01 RX ORDER — KETAMINE HCL IN NACL, ISO-OSM 100MG/10ML
SYRINGE (ML) INJECTION AS NEEDED
Status: DISCONTINUED | OUTPATIENT
Start: 2024-04-01 | End: 2024-04-01

## 2024-04-01 RX ORDER — NALOXONE HYDROCHLORIDE 4 MG/.1ML
SPRAY NASAL
Qty: 1 EACH | Refills: 1 | Status: SHIPPED | OUTPATIENT
Start: 2024-04-01 | End: 2024-04-01 | Stop reason: SDUPTHER

## 2024-04-01 RX ORDER — PHENYLEPHRINE HCL IN 0.9% NACL 1 MG/10 ML
SYRINGE (ML) INTRAVENOUS AS NEEDED
Status: DISCONTINUED | OUTPATIENT
Start: 2024-04-01 | End: 2024-04-01

## 2024-04-01 RX ORDER — PROPOFOL 10 MG/ML
INJECTION, EMULSION INTRAVENOUS CONTINUOUS PRN
Status: DISCONTINUED | OUTPATIENT
Start: 2024-04-01 | End: 2024-04-01

## 2024-04-01 RX ORDER — PANTOPRAZOLE SODIUM 40 MG/1
40 TABLET, DELAYED RELEASE ORAL DAILY
Status: DISCONTINUED | OUTPATIENT
Start: 2024-04-02 | End: 2024-04-01 | Stop reason: HOSPADM

## 2024-04-01 RX ORDER — MIDAZOLAM HYDROCHLORIDE 2 MG/2ML
INJECTION, SOLUTION INTRAMUSCULAR; INTRAVENOUS AS NEEDED
Status: DISCONTINUED | OUTPATIENT
Start: 2024-04-01 | End: 2024-04-01

## 2024-04-01 RX ORDER — HYDROMORPHONE HCL IN WATER/PF 6 MG/30 ML
0.2 PATIENT CONTROLLED ANALGESIA SYRINGE INTRAVENOUS
Status: DISCONTINUED | OUTPATIENT
Start: 2024-04-01 | End: 2024-04-01 | Stop reason: HOSPADM

## 2024-04-01 RX ORDER — OXYCODONE HYDROCHLORIDE 5 MG/1
TABLET ORAL
Qty: 40 TABLET | Refills: 0 | Status: SHIPPED | OUTPATIENT
Start: 2024-04-01 | End: 2024-04-01 | Stop reason: SDUPTHER

## 2024-04-01 RX ORDER — METOCLOPRAMIDE HYDROCHLORIDE 5 MG/ML
10 INJECTION INTRAMUSCULAR; INTRAVENOUS ONCE AS NEEDED
Status: DISCONTINUED | OUTPATIENT
Start: 2024-04-01 | End: 2024-04-01

## 2024-04-01 RX ORDER — ACETAMINOPHEN 325 MG/1
975 TABLET ORAL EVERY 8 HOURS SCHEDULED
Status: DISCONTINUED | OUTPATIENT
Start: 2024-04-01 | End: 2024-04-01 | Stop reason: HOSPADM

## 2024-04-01 RX ORDER — DEXAMETHASONE SODIUM PHOSPHATE 10 MG/ML
INJECTION, SOLUTION INTRAMUSCULAR; INTRAVENOUS AS NEEDED
Status: DISCONTINUED | OUTPATIENT
Start: 2024-04-01 | End: 2024-04-01

## 2024-04-01 RX ORDER — OXYCODONE HYDROCHLORIDE 5 MG/1
TABLET ORAL
Qty: 40 TABLET | Refills: 0 | Status: SHIPPED | OUTPATIENT
Start: 2024-04-01

## 2024-04-01 RX ORDER — GABAPENTIN 300 MG/1
300 CAPSULE ORAL
Status: DISCONTINUED | OUTPATIENT
Start: 2024-04-01 | End: 2024-04-01 | Stop reason: HOSPADM

## 2024-04-01 RX ORDER — SODIUM CHLORIDE, SODIUM LACTATE, POTASSIUM CHLORIDE, CALCIUM CHLORIDE 600; 310; 30; 20 MG/100ML; MG/100ML; MG/100ML; MG/100ML
20 INJECTION, SOLUTION INTRAVENOUS CONTINUOUS
Status: CANCELLED | OUTPATIENT
Start: 2024-04-01

## 2024-04-01 RX ORDER — SODIUM CHLORIDE, SODIUM LACTATE, POTASSIUM CHLORIDE, CALCIUM CHLORIDE 600; 310; 30; 20 MG/100ML; MG/100ML; MG/100ML; MG/100ML
75 INJECTION, SOLUTION INTRAVENOUS CONTINUOUS
Status: DISCONTINUED | OUTPATIENT
Start: 2024-04-01 | End: 2024-04-01 | Stop reason: HOSPADM

## 2024-04-01 RX ORDER — PRAMIPEXOLE DIHYDROCHLORIDE 0.25 MG/1
0.25 TABLET ORAL
Status: DISCONTINUED | OUTPATIENT
Start: 2024-04-01 | End: 2024-04-01 | Stop reason: HOSPADM

## 2024-04-01 RX ORDER — ONDANSETRON 2 MG/ML
4 INJECTION INTRAMUSCULAR; INTRAVENOUS ONCE AS NEEDED
Status: DISCONTINUED | OUTPATIENT
Start: 2024-04-01 | End: 2024-04-01 | Stop reason: HOSPADM

## 2024-04-01 RX ORDER — NALOXONE HYDROCHLORIDE 4 MG/.1ML
SPRAY NASAL
Qty: 1 EACH | Refills: 1 | Status: SHIPPED | OUTPATIENT
Start: 2024-04-01 | End: 2025-04-01

## 2024-04-01 RX ADMIN — TRANEXAMIC ACID 1000 MG: 10 INJECTION, SOLUTION INTRAVENOUS at 09:31

## 2024-04-01 RX ADMIN — MIDAZOLAM 2 MG: 1 INJECTION INTRAMUSCULAR; INTRAVENOUS at 09:13

## 2024-04-01 RX ADMIN — Medication 100 MCG: at 10:36

## 2024-04-01 RX ADMIN — GABAPENTIN 300 MG: 300 CAPSULE ORAL at 07:16

## 2024-04-01 RX ADMIN — Medication 10 MG: at 09:48

## 2024-04-01 RX ADMIN — DEXAMETHASONE SODIUM PHOSPHATE 10 MG: 10 INJECTION, SOLUTION INTRAMUSCULAR; INTRAVENOUS at 09:38

## 2024-04-01 RX ADMIN — OXYCODONE HYDROCHLORIDE 5 MG: 5 TABLET ORAL at 13:40

## 2024-04-01 RX ADMIN — BUPIVACAINE HYDROCHLORIDE IN DEXTROSE 1.3 ML: 7.5 INJECTION, SOLUTION SUBARACHNOID at 09:20

## 2024-04-01 RX ADMIN — OXYCODONE HYDROCHLORIDE 10 MG: 10 TABLET, FILM COATED, EXTENDED RELEASE ORAL at 07:17

## 2024-04-01 RX ADMIN — ONDANSETRON 4 MG: 2 INJECTION INTRAMUSCULAR; INTRAVENOUS at 09:36

## 2024-04-01 RX ADMIN — ACETAMINOPHEN 975 MG: 325 TABLET ORAL at 07:17

## 2024-04-01 RX ADMIN — Medication 100 MCG: at 10:45

## 2024-04-01 RX ADMIN — BUPIVACAINE 10 ML: 13.3 INJECTION, SUSPENSION, LIPOSOMAL INFILTRATION at 11:38

## 2024-04-01 RX ADMIN — CEFAZOLIN SODIUM 2000 MG: 2 SOLUTION INTRAVENOUS at 14:57

## 2024-04-01 RX ADMIN — FENTANYL CITRATE 25 MCG: 50 INJECTION INTRAMUSCULAR; INTRAVENOUS at 11:56

## 2024-04-01 RX ADMIN — PROPOFOL 100 MG: 10 INJECTION, EMULSION INTRAVENOUS at 09:22

## 2024-04-01 RX ADMIN — CHLORHEXIDINE GLUCONATE 15 ML: 1.2 SOLUTION ORAL at 07:18

## 2024-04-01 RX ADMIN — SODIUM CHLORIDE, SODIUM LACTATE, POTASSIUM CHLORIDE, AND CALCIUM CHLORIDE 125 ML/HR: .6; .31; .03; .02 INJECTION, SOLUTION INTRAVENOUS at 07:19

## 2024-04-01 RX ADMIN — PROPOFOL 80 MCG/KG/MIN: 10 INJECTION, EMULSION INTRAVENOUS at 09:22

## 2024-04-01 RX ADMIN — BUPIVACAINE HYDROCHLORIDE 10 ML: 5 INJECTION, SOLUTION EPIDURAL; INTRACAUDAL; PERINEURAL at 11:38

## 2024-04-01 RX ADMIN — GLYCOPYRROLATE 0.1 MG: 0.2 INJECTION, SOLUTION INTRAMUSCULAR; INTRAVENOUS at 09:35

## 2024-04-01 RX ADMIN — ACETAMINOPHEN 975 MG: 325 TABLET ORAL at 14:58

## 2024-04-01 RX ADMIN — Medication 100 MCG: at 09:51

## 2024-04-01 RX ADMIN — Medication 100 MCG: at 10:18

## 2024-04-01 RX ADMIN — Medication 10 MG: at 09:42

## 2024-04-01 RX ADMIN — CEFAZOLIN SODIUM 2000 MG: 2 SOLUTION INTRAVENOUS at 09:24

## 2024-04-01 NOTE — ANESTHESIA POSTPROCEDURE EVALUATION
Post-Op Assessment Note    CV Status:  Stable  Pain Score: 1    Pain management: adequate       Mental Status:  Alert and awake   Hydration Status:  Euvolemic   PONV Controlled:  Controlled   Airway Patency:  Patent     Post Op Vitals Reviewed: Yes    No anethesia notable event occurred.    Staff: CRNA               BP   118/59   Temp   97.5   Pulse  94   Resp   18   SpO2   98

## 2024-04-01 NOTE — TELEPHONE ENCOUNTER
Caller: Patient's spouse Sky    Doctor: Rosy    Reason for call: Paperwork for disability was left at doctors office but couldn't be completed until Jen had surgery which was today. Spouse is going to stop at office to get paperwork started for disability.    Call back#: [phone]

## 2024-04-01 NOTE — INTERVAL H&P NOTE
H&P reviewed. After examining the patient I find no changes in the patients condition since the H&P had been written.  Patient seen and examined at bedside and states that her activity related left hip and groin pain continues to be a significant source of pain for her.  It continues to limit her quality of life and ability to carry out ADLs.  Please see exam below.  Patient denies any recent fever, chills, nausea, vomiting, headache, chest pain, trouble breathing.  The patient has been seen and cleared by her medical subspecialist in preparation for the procedure.  She will be a good candidate for aspirin postoperatively for DVT prophylaxis.  The patient be a good candidate for outpatient physical therapy following discharge from the hospital today.  All questions addressed.  Proceed the OR for direct anterior left total arthroplasty.    Vitals:    04/01/24 0714   BP: 134/81   Pulse: 71   Resp: 16   Temp: (!) 97.2 °F (36.2 °C)   SpO2: 97%     Left Hip Exam      Tenderness   The patient is experiencing no tenderness.      Range of Motion   Abduction:  20 abnormal   Adduction:  20 abnormal   Extension:  0 normal   Flexion:  abnormal Left hip flexion: 85.  External rotation:  abnormal Left hip external rotation: 45.  Internal rotation: 5 abnormal      Muscle Strength   Abduction: 5/5   Adduction: 5/5   Flexion: 5/5      Tests   ESA: negative  Salom[place]: negative     Other   Erythema: absent  Scars: absent  Sensation: normal  Pulse: present     Comments:  Positive ESA Garcia, log roll   Thigh and calf soft and nontender  SILT L2-S1  LLE: 6-8mm shorter than RLE    Blaze Gallegos D.O.  Division of Adult Reconstruction  Department of Orthopaedic Surgery  [facility]

## 2024-04-01 NOTE — OP NOTE
OPERATIVE REPORT  PATIENT NAME: Jen Clark  : 1965  MRN: [id number]  Pt Location:  [facility] OR ROOM 03    Surgery Date: 2024    Surgeons and Role:     * Blaze Gallegos,  - Primary     * Nam Castaneda PA-C - Assisting, no qualified resident was available an assistant was needed for patient positioning, prepping and draping, soft tissue retraction, protection of vital structures throughout the case, exposure of the acetabular femur for preparation and implantation of final prosthesis, superficial closure, and to complete the case safely.     * Jameel Garner,  ATC/OTC - 2nd Assist    Preop Diagnosis:  Primary osteoarthritis of left hip [M16.12]  Left hip pain [M25.552]    Post-Op Diagnosis Codes:     * Primary osteoarthritis of left hip [M16.12]     * Left hip pain [M25.552]    Procedure(s):  Left - ARTHROPLASTY HIP TOTAL ANTERIOR.NAVIGATED     Specimens:  * No specimens in log *    Estimated Blood Loss:   220 mL    Drains: None    Anesthesia Type:   Spinal with sedation, postoperative SYED block with Exparel    Antibiotics: Ancef 2 g    Intravenous fluids: 1 L    Urine output: No [place]    Implant Name Type Inv. Item Serial No.  Lot No. LRB No. Used Action   ACETABULAR SHELL 3HL 50MM CMNTLS EMPHASYS - [id number]  ACETABULAR SHELL 3HL 50MM CMNTLS EMPHASYS  DEPUY [id number] Left 1 Implanted   LINER AOX NEUTRAL 50 X 36MM EMPHASYS - [id number]  LINER AOX NEUTRAL 50 X 36MM EMPHASYS  DEPUY [id number] Left 1 Implanted   STEM FEM SZ 6 TAPER CMNTLS HI COLLAR ACTIS - [id number]  STEM FEM SZ 6 TAPER CMNTLS HI COLLAR ACTIS  DEPUY [id number] Left 1 Implanted   HEAD FEMORAL 12/14 TPR 36MM +1.5MM ARTICULEZE BIOLOX DELTA - [id number]  HEAD FEMORAL 12/14 TPR 36MM +1.5MM ARTICULEZE BIOLOX DELTA  DEPUY [id number] Left 1 Implanted     Operative Indications:  Primary osteoarthritis of left hip [M16.12]  Left hip pain [M25.552]  Patient is a very pleasant 58-year-old female known to me for treatment of her activity  related left hip and groin pain.  The patient has attempted multiple forms of conservative measures of treatment and all have failed to provide long-lasting relief of her discomfort.  With failed conservative treatment, persistent activity related pain, and end-stage osteoarthritis of the left hip I recommended that she undergo direct anterior left total of arthroplasty.  She was agreeable to this.  Consents were signed and placed into the chart.  Patient underwent direct anterior left total of arthroplasty on 4/1/2024.    Operative Findings:  Provide the patient was found to have grade 4 degenerative change of both sides of the hip articulation.  The hip navigation software was utilized throughout the case.  A press-fit acetabular component was implanted into the pelvis and approximately 44 degrees of inclination and 23 degrees of anteversion.  Had excellent press-fit and was well-seated within the acetabulum.  The polyethylene liner was locked into place.  The press-fit femoral component was then implanted down into the proximal femur and at its final station it had excellent rotational and axial stability.  The final construct had excellent stability to 50 degrees of internal rotation, 110 degrees of external rotation, and 50 degrees of external rotation with the leg lowered to the floor.  On leg length evaluation via the hip navigation software the operative extremity was lengthened approxi-7 mm from its preoperative state in accordance with the preoperative templating plan.  Patient tolerated the procedure well.  There were no complications.    Complications:   None    Hip Approach: Direct anterior    Procedure and Technique:  The patient was identified and marked in the preoperative holding area, and the correct operative extremity and intended procedure was confirmed. The consents were visualized and confirmed for correct procedure and laterality. The patient was then taken back to the operating room where  there were administered spinal anesthesia by the anesthesia staff. The patient was administered 2 g of Ancef intravenously by the anesthesia staff. The patient was then transferred to the HANA table for the procedure. After the patient was appropriately positioned, a AP of the pelvis was obtained using fluoroscopy to evaluate preoperative leg lengths. It was found that the left lower extremity was approximately 7-8 mm shorter than the right lower extremity.  The left lower extremity was prepped and draped in a standard sterile fashion. After a timeout was performed and all members of the operating room team were in agreement of the correct patient, and correct intended procedure the bony landmarks were identified using marking pen. Tranexamic acid was administered by anesthesia.  A modified Ulevano-Presley incision was delineated obliquely starting 2 cm distal and 2 cm laterally to the ASIS.  Sharp dissection was carried down through the subcutaneous tissues to the investing fascia of the tensor fascia manav muscle. Electrocautery was used to maintain hemostasis in the subcutaneous tissues. The investing fascia of the tensor fascia manav was incised in line with the fibers in this compartment was entered bluntly and the muscle was retracted laterally. The perforating circumflex femoral vessels were carefully identified and cauterized using electrocautery and the Aquamantis. Dissection was then carried down to the left hip capsule, and the pre-capsular fat was excised. A capsulotomy was carried out across the edge of the acetabulum superiorly down the femoral neck and into the intra-articular trochanteric line. Both sides of the capsulotomy were tagged with a Ethibond suture.  Dissection was carried out medially around the medial calcar. It was also carried out laterally to expose the saddle. The femoral neck osteotomy was templated and carried out using an oscillating saw and finished with a osteotome in accordance  with preoperative templating. The femoral head was extracted from the acetabulum using a corkscrew.  There were grade 4 changes diffusely with osteophytes peripherally.  The femoral head was sized.  There were grade 4 changes diffusely in the acetabulum.  The acetabulum was cleaned of debris and pulvinar, the KADEEM was well-visualized, the remaining labrum was removed, and reaming began. The acetabulum was reamed starting with a size 44 reamer and carried up in 1-2 mm increments until a size 49 was reached. A size 49 acetabular trial was impacted into the acetabulum and demonstrated an appropriate fit.  The hip navigation software was utilized throughout the case.  The appropriate abduction and anteversion of approximately 45° and 21° respectively was confirmed using fluoroscopy. The trial implant was removed, post bone was touched with a size 50 reamer, and a size 50 Depuy Emphasys cluster hole final acetabular component was impacted into place under direct visualization with fluoroscopy. This demonstrated excellent scratch fit.  The cup was well-seated on inspection.  This final position was in 44 degrees of inclination and 23 degrees of anteversion. The final 36 mm/50 mm Emphasys AOX highly cross-linked polyethylene insert was impacted into the acetabular component.  The liner was locked in its position on visual and tactile inspection.      Attention was then turned back to the proximal femur.  The appropriate proximal femoral releases were utilized as needed to mobilize the femur, ensuring not to release the obturator externus.  The soft tissue was removed from the region between the femoral neck and the greater trochanter a box osteotome was used to cut into the lateral aspect of the proximal femur.  The small starter broach was then inserted into the proximal femur adding proximally 5° of anteversion while preparing the proximal femur. Broaching was carried up in sequence until a size 6 broach demonstrated  excellent fit and rotational stability. The calcar planer was used to bring the femoral neck osteotomy coplanar with the broach. Trialing was performed.  A trial construct with a standard neck and a +1.5 36 mm femoral head showed excellent stability on 110 degrees of external rotation and 50 degrees of internal rotation. On 50 degrees of external rotation and extension of the hip to the floor there was subtle luxation. Leg lengths were evaluated on fluoroscopy images and the hip navigation software.  The operative extremity was lengthened approximate 7 mm from its preoperative state with this construct.  This was the desired outcome.  To address the subtle subluxation on stability testing it was decided that a high offset stem would be utilized.  This produced an acceptable amount of femoral and total offset, and this also would improve stability.  This was deemed to be the final construct. The trial components were removed from the proximal femur and the final ShopPaduy Actis size 6 high offset femoral component was inserted into the femur by hand and impacted into place. The trunnion was cleaned and dried and the final Biolox delta ceramic +1.5 36 mm head was impacted upon the final femoral stem. Hip was reduced and taken through stability testing. The patient demonstrated excellent stability with 110 degrees of external rotation, 50 degrees of internal rotation, and 50 degrees of external rotation and extension of the hip to the floor.  There was no lateral subluxation of the hip on manual testing.  Leg lengths were again evaluated using fluoroscopy and the leg lengths were unchanged from trialing.    The wound was copiously irrigated with Irrisept followed by normal saline solution, the capsule was closed using a #2 Ethibond suture. The wound was again irrigated.  The investing fascia of the tensor fascia manav muscle was closed using a bidirectional barbed #1 barbed suture.  The deep subcutaneous tissues were  reapproximated using an undyed 0 Vicryl, the superficial subcutaneous tissues were reapproximated using an undyed 2-0 Vicryl, the skin edges were reapproximated using a 3-0 bidirectional barbed Monocryl suture, and the skin edges sealed with Exofin.  After the skin adhesive had dried a silver impregnated Mepilex dressing was applied over the surgical incision.  The patient was awakened from spinal anesthesia with sedation and transferred to PACU in stable condition.     I was present for all critical portions of the procedure.    Patient Disposition:  PACU         SIGNATURE: Blaze Gallegos DO  DATE: April 1, 2024  TIME: 11:24 AM

## 2024-04-01 NOTE — ANESTHESIA PREPROCEDURE EVALUATION
Procedure:  ARTHROPLASTY HIP TOTAL ANTERIOR,NAVIGATED - same day (Left: Hip)    Relevant Problems   CARDIO   (+) Other hyperlipidemia      GI/HEPATIC   (+) Cyst of pancreas   (+) Gastroesophageal reflux disease      MUSCULOSKELETAL   (+) Primary osteoarthritis of left hip      Orthopedic/Musculoskeletal   (+) Spinal stenosis of lumbar region        Physical Exam    Airway    Mallampati score: II  TM Distance: >3 FB  Neck ROM: full     Dental   Comment: Denies loose teeth     Cardiovascular  Cardiovascular exam normal    Pulmonary  Pulmonary exam normal     Other Findings  Portions of exam deferred due to low yield and/or unknown COVID statuspost-pubertal.      Anesthesia Plan  ASA Score- 2     Anesthesia Type- spinal with ASA Monitors.         Additional Monitors:     Airway Plan:            Plan Factors-Exercise tolerance (METS): >4 METS.    Chart reviewed.   Existing labs reviewed. Patient summary reviewed.    Patient is not a current smoker.              Induction- intravenous.    Postoperative Plan-     Informed Consent- Anesthetic plan and risks discussed with patient.  I personally reviewed this patient with the CRNA. Discussed and agreed on the Anesthesia Plan with the CRNA..

## 2024-04-01 NOTE — PERIOPERATIVE NURSING NOTE
Received from [facility]. Left hip dsg dry, intact. Ice pack to left hip incision. Left pedal pulse strong, palpable. Pt able to wiggle toes Spouse at bedside. Call bell in reach.

## 2024-04-01 NOTE — ANESTHESIA PROCEDURE NOTES
Peripheral Block    Patient location during procedure: PACU  Start time: 4/1/2024 11:38 AM  Reason for block: at surgeon's request and post-op pain management  Staffing  Performed by: Rivka Mathias MD  Authorized by: Rivka Mathias MD    Preanesthetic Checklist  Completed: patient identified, IV checked, site marked, risks and benefits discussed, surgical consent, monitors and equipment checked, pre-op evaluation and timeout performed  Peripheral Block  Patient position: supine  Prep: ChloraPrep  Patient monitoring: frequent blood pressure checks, continuous pulse oximetry and heart rate  Block type: PENG  Laterality: left  Injection technique: single-shot  Procedures: ultrasound guided, Ultrasound guidance required for the procedure to increase accuracy and safety of medication placement and decrease risk of complications.  Ultrasound permanent image savedbupivacaine (PF) (MARCAINE) 0.5 % injection 20 mL - Perineural   10 mL - 4/1/2024 11:38:00 AM  bupivacaine liposomal (EXPAREL) 1.3 % injection 20 mL - Perineural   10 mL - 4/1/2024 11:38:00 AM  Needle  Needle type: Stimuplex   Needle gauge: 20 G  Needle length: 4 in  Needle localization: anatomical landmarks and ultrasound guidance  Assessment  Injection assessment: incremental injection, frequent aspiration, injected with ease, negative aspiration, negative for heart rate change, no paresthesia on injection, no symptoms of intraneural/intravenous injection and needle tip visualized at all times  Paresthesia pain: none  Post-procedure:  site cleaned  patient tolerated the procedure well with no immediate complications

## 2024-04-01 NOTE — ANESTHESIA PROCEDURE NOTES
Spinal Block    Patient location during procedure: OR  Start time: 4/1/2024 9:20 AM  Reason for block: primary anesthetic  Staffing  Performed by: Pillo Dave CRNA  Authorized by: Rivka Mathias MD    Preanesthetic Checklist  Completed: patient identified, IV checked, site marked, risks and benefits discussed, surgical consent, monitors and equipment checked, pre-op evaluation and timeout performed  Spinal Block  Patient position: sitting  Prep: ChloraPrep  Patient monitoring: frequent blood pressure checks, continuous pulse ox and heart rate  Approach: midline  Location: L3-4  Injection technique: single-shot  Needle  Needle type: pencil-tip   Needle gauge: 25 G  Needle length: 5 cm  Assessment  Sensory level: T10  Events: cerebrospinal fluid  Injection Assessment:  positive aspiration for clear CSF, no paresthesia on injection and negative aspiration for heme.  Post-procedure:  site cleaned

## 2024-04-01 NOTE — OCCUPATIONAL THERAPY NOTE
Occupational Therapy Evaluation     Patient Name: Jen Clark  Today's Date: 4/1/2024  Problem List  Active Problems:  There are no active Hospital Problems.    Past Medical History  Past Medical History:   Diagnosis Date    Arthralgia of right acromioclavicular joint     Lyme disease     Postmenopausal bleeding     Situational anxiety     Small bowel obstruction (HCC)     Strain of deltoid muscle, left, initial encounter      Past Surgical History  Past Surgical History:   Procedure Laterality Date    COLONOSCOPY  2013    DILATION AND CURETTAGE OF UTERUS      OVARIAN CYST REMOVAL  1995    MO LAPS ABD PRTM&OMENTUM DX W/WO SPEC BR/[facility] SPX N/A 2/21/2021    Procedure: LAPAROSCOPY DIAGNOSTIC, exploratory laparotomy, lysis of adhesions, REDUCTION OF INTERNAL HERNIA;  Surgeon: Cris Luevano MD;  Location: [facility];  Service: General         04/01/24 1501   OT Last Visit   OT Visit Date 04/01/24  (Monday)   Note Type   Note type Evaluation   Pain Assessment   Pain Assessment Tool 0-10   Pain Score 4   Pain Location/Orientation Location: Hip;Location: Leg;Orientation: Left   Pain Onset/Description Descriptor: Burning   Effect of Pain on Daily Activities limits activity tolerance, pace during ADLs   Patient's Stated Pain Goal No pain   Hospital Pain Intervention(s) Cold applied;Repositioned;Ambulation/increased activity;Emotional support   Restrictions/Precautions   Weight Bearing Precautions Per Order Yes   LLE Weight Bearing Per Order WBAT  (s/p ant L ERICK w/ Dr. Gallegos)   Other Precautions WBS;Pain   Home Living   Type of Home House   Home Layout Multi-level  (2 CRISTINO between bedroom/ bathroom and living level)   Bathroom Shower/Tub Tub/shower unit   Bathroom Toilet Standard   Bathroom Equipment Grab bars in shower;Shower chair;Toilet raiser   Bathroom Accessibility Accessible   Home Equipment Walker;Long-handled shoehorn   Additional Comments Pt reports living w/ spouse in  multi level home   Prior Function   Level of  "[place]abella Independent with ADLs;Independent with functional mobility;Independent with IADLS   Lives With Spouse;Son   Receives Help From Outpatient therapy  (OPPT prior to surgery)   IADLs Independent with driving;Independent with meal prep;Independent with medication management   Vocational Full time employment   Comments Pt reports I w/ ADLs at baseline using cane as needed   Lifestyle   Autonomy Pt reports I w/ ADLs at baseline w/ + time due to hip pain   Reciprocal Relationships Supportive  present during eval, son   Service to Others 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 [employer]   Intrinsic Gratification Pt reports enjoying reading and would like to be able to hike again   General   Additional Pertinent History Pt is POD#0 s/p L ant ERICK w/ anticiapted same day discharge   Family/Caregiver Present Yes   Additional General Comments Significant PMH Impacting her occupational performance includes   Subjective   Subjective \"I feel like I am leaking every time I move\"   ADL   Where Assessed Edge of bed  (vs OOB in chair)   Eating Assistance 7  Independent   Grooming Assistance 6  Modified Independent  (seated after set- up w/ mod I vs S in stance using RW)   Grooming Deficit Setup   UB Bathing Assistance Unable to assess   LB Bathing Assistance Unable to assess   UB Dressing Assistance 7  Independent   LB Dressing Assistance 4  Minimal Assistance  (educated pt on tech to thread L LE first while seated)   LB Dressing Deficit Thread LLE into underwear;Don/doff L sock;Setup;Steadying;Requires assistive device for steadying;Verbal cueing;Supervision/safety;Increased time to complete   Toileting Assistance  5  Supervision/Setup   Toileting Deficit Setup;Supervison/safety;Increased time to complete;Bedside commode  (attempted to void seated on commode)   Additional Comments Incontinent of urine upon standing and prior to therapist arrival supine in bed (pat saturdated)   Bed Mobility "   Supine to Sit 4  Minimal assistance   Additional items Assist x 1;Increased time required;LE management;Bedrails  (to pt's R)   Sit to Supine Unable to assess   Additional Comments Pt seated OOB In chair post eval w/ needs met, call bell in reach   Transfers   Sit to Stand 5  Supervision   Additional items Assist x 1;Armrests;Verbal cues   Stand to Sit 5  Supervision   Additional items Assist x 1;Verbal cues;Armrests   Additional Comments Pt performed sit <> stand 3X w/ S. Cues / instruction for hand placement   Functional Mobility   Functional Mobility 5  Supervision   Additional Comments household distances using RW w/ S/ Cues for sequencing, walker mgmt   Additional items Rolling walker   Balance   Static Sitting Fair +   Dynamic Sitting Fair   Static Standing Fair   Ambulatory Fair  (using RW)   Activity Tolerance   Activity Tolerance Patient tolerated treatment well;Other (Comment)  (urinay incontinence)   Medical Staff Made Aware spoke w/ Leeann DÍAZ   Nurse Made Aware spoke w/ RN   RUE Assessment   RUE Assessment WFL   RUE Strength   RUE Overall Strength Within Functional Limits - able to perform ADL tasks with strength   LUE Assessment   LUE Assessment WFL   LUE Strength   LUE Overall Strength Within Functional Limits - able to perform ADL tasks with strength   Hand Function   Gross Motor Coordination Functional   Fine Motor Coordination Functional   Cognition   Overall Cognitive Status WFL   Arousal/Participation Alert;Cooperative   Attention Attends with cues to redirect   Orientation Level Oriented X4   Memory Within functional limits   Following Commands Follows all commands and directions without difficulty   Comments Identified pt by full name and birthdate. Alert, oriented and able to follow directions, communicate wants / needs   Assessment   Limitation Decreased ADL status;Decreased endurance;Decreased self-care trans;Decreased high-level ADLs   Assessment Pt is a 57yo female s/p brendan SUAREZ w/   Rosy on 4/1/24, and anticipated same day discharge. Pt reports living w/ spouse, son I multi level home and I w/ ADLs at baseline. Pt reports use of cane due to hip pain and difficulty completing LBD, IADLs. Upon eval, pt alert and oriented. Able to follow directions, communicate wants / needs. Pt required min A to complete bed mobility, S sit <> stand, S using RW for functional mobility, min A LBD, S toileting, mod I grooming. Pt is completing ADLs below baseline level of I due to anticiapted L LE ROM / strength deficits and increased pain. Pt would benefit from OT In acute care to maximize functional independence. Recommend no post acute (OT) rehab needs when medically stable, and DC home w/ OPPT using RW. Will continue to follow   Goals   Patient Goals Pt stated that she would like to be able to hike again   Plan   Treatment Interventions ADL retraining;Functional transfer training;Endurance training;Equipment evaluation/education;Patient/family training;Continued evaluation;Energy conservation;Activityengagement   Goal Expiration Date 04/08/24   OT Treatment Day 0  (Monday 4/1/24)   OT Frequency 3-5x/wk   Discharge Recommendation   Rehab Resource Intensity Level, OT No post-acute rehabilitation needs  (DC home w/ OPPT when medically stable s/p L ant ERICK)   AM-PAC Daily Activity Inpatient   Lower Body Dressing 3   Bathing 3   Toileting 3   Upper Body Dressing 4   Grooming 4   Eating 4   Daily Activity Raw Score 21   Daily Activity Standardized Score (Calc for Raw Score >=11) 44.27   AM-PAC Applied Cognition Inpatient   Following a Speech/Presentation 4   Understanding Ordinary Conversation 4   Taking Medications 4   Remembering Where Things Are Placed or Put Away 4   Remembering List of 4-5 Errands 4   Taking Care of Complicated Tasks 4   Applied Cognition Raw Score 24   Applied Cognition Standardized Score 62.21   Barthel Index   Feeding 10   Bathing 0   Grooming Score 5   Dressing Score 5   Bladder Score 0    Bowels Score 10   Toilet Use Score 5   Transfers (Bed/Chair) Score 10   Mobility (Level Surface) Score 0   Stairs Score 5   Barthel Index Score 50   End of Consult   Education Provided Yes;Family or social support of family present for education by provider   Patient Position at End of Consult Bedside chair;All needs within reach   Nurse Communication Nurse aware of consult   Licensure   [place] License Number  Radha Barbosa, OTR/L [id number]        The patient's raw score on the AM-PAC Daily Activity Inpatient Short Form is 21. A raw score of greater than or equal to 19 suggests the patient may benefit from discharge to home. Please refer to the recommendation of the Occupational Therapist for safe discharge planning.    Pt goals to be met by 4/8/24 to max I w/ ADLs and improve engagement to return home and resume hiking again includes:    -Pt will complete bed mobility supine <> sit independently in preparation for ADLs    -Pt will complete functional transfers to bed, chair, and toilet using LRAD, DME as needed w/ mod I for + time    -Pt will complete tub / shower transfer using LRAD, DME as needed w/ S to max I w/ bathing    -Pt will complete LBD w/ mod I using LHAE as needed    -Pt will demonstrate improved functional standing tolerance for at least 10 minutes w/ at least fair balance using LRAD while engaged in grooming w/ mod I to max I w/ bathing and light IADLs    Radha DAJADelio Barbosa, OTR/L  [id number]  [id number]

## 2024-04-02 ENCOUNTER — TELEPHONE (OUTPATIENT)
Dept: OBGYN CLINIC | Facility: HOSPITAL | Age: 59
End: 2024-04-02

## 2024-04-02 NOTE — TELEPHONE ENCOUNTER
"Patient contacted for a postoperative follow up assessment. Patient reports doing  \"okay\" and ambulating with the RW.   She reports getting up or down off toilet increases the pain the most.   She is ambulating every hour around the home.   Patient reports swelling is \"more today\" at the thigh, \"above the knee.\" We discussed continuing to ICE areas of pain and swelling, especially after increased activity.   She reports dressing is clean, dry and intact.     We reviewed patients AVS medication list. Patient is taking Tylenol 1000mg every 8 hours, Oxycodone every 6 hours (taking two tablets, currently 10mg), Celebrex 200mg BID, ASA 325mg BID, Colace 100mg BID. Patient has not yet had a BM, discussed continuing the colace until going regularly.     Patient denies nausea, vomiting, abdominal pain, chest pain, shortness of breath, fever, dizziness and calf pain. Patient does not have any other questions or concerns at this time. Pt was encouraged to call with any questions, concerns or issues.    "

## 2024-04-02 NOTE — TELEPHONE ENCOUNTER
Called and spoke with Sky, explained previous message, he understood and will stop by for the forms later today, thanks

## 2024-04-02 NOTE — TELEPHONE ENCOUNTER
Caller: Sky    Doctor: Rosy    Reason for call: I advised patient that [place] [employer] Disabilty was submitted online and there is a copy in his chart.    He is asking if the [place] FMLA was completed and faxed in as well? I did not see that under media?    Asking which is up front for him?    Call back#: [phone]

## 2024-04-04 ENCOUNTER — OFFICE VISIT (OUTPATIENT)
Dept: PHYSICAL THERAPY | Facility: CLINIC | Age: 59
End: 2024-04-04
Payer: COMMERCIAL

## 2024-04-04 DIAGNOSIS — M16.12 PRIMARY OSTEOARTHRITIS OF LEFT HIP: Primary | ICD-10-CM

## 2024-04-04 DIAGNOSIS — M25.552 LEFT HIP PAIN: ICD-10-CM

## 2024-04-04 PROCEDURE — 97530 THERAPEUTIC ACTIVITIES: CPT | Performed by: PHYSICAL THERAPIST

## 2024-04-04 PROCEDURE — 97110 THERAPEUTIC EXERCISES: CPT | Performed by: PHYSICAL THERAPIST

## 2024-04-04 NOTE — PROGRESS NOTES
Re-evaluation     Today's date: 2024  Patient name: Jen Clark  : 1965  MRN: [id number]  Referring provider: Blaze Gallegos DO  Dx:   Encounter Diagnosis     ICD-10-CM    1. Primary osteoarthritis of left hip  M16.12       2. Left hip pain  M25.552                  Subjective: Patient presenting 3 days post op ERICK. Reports doing well. Ambulating with RW and had bowel movements. Has tub seat and hand held shower head. Easier descending than ascending steps.     Objective: See treatment diary below    Patient Goals  Patient goals for therapy: decreased pain, increased motion, increased strength and independence with ADLs/IADLs  Patient goal: walk without pain  Pain  Current pain rating: 3  At worst pain ratin  Quality: dull ache and grinding  Relieving factors: medications  Aggravating factors: stair climbing, standing and walking  Progression: worsening     Social Support  Steps to enter house: yes (no railings from garage, bilateral railings entering from driveway, 8-10 steps)  3  Stairs in house: yes   Lives in: multiple-level home     Treatments  Previous treatment: injection treatment      Short Term: FROM DOS scheduled 24  Pt will report decreased levels of pain by at least 2 subjective ratings in 4 weeks  Pt will demonstrate improved ROM by at least 10 degrees in 4 weeks  Pt will demonstrate improved strength by 1/2 grade in 4 weeks.  Pt will be able to ambulate without AD in 4 weeks  Long Term:   Pt will be independent in their HEP in 8 weeks  Pt will be pain free with IADL's in 8 weeks.  Pt will demonstrate 5/5 MMT in 8 weeks.  Pt will be able to ambulate> 20 minutes without limitation in 8 weeks.  PT will be able to perform reciprocal stair navigation without issue in 8 weeks.     Objective     GAIT: Left gait antalgia with RW with knee bent  Squat assess: 50% depth right lateral lean  TU sec. With RW               MMT          AROM           PROM     Hip         R          L       "   R        L          R         L   Flex. 5 3     105 88   Extn. 4 3+           Abd. 4 2   35 30   Add.               IR. 4 3+     18 15   ER. 4 3+     25 15                  MMT     Knee      R          L   Flex. 5 5   Extn. 5 5                        MMT     Ankle         R          L   PF 5 4   DF. 5 5        Hip:  no special testing performed    Assessment: Tolerated treatment well. Patient  reported feeling good with treatment progression today. Overall good patient presentation 3 days post op ERICK. Limb lengthening performed of 7mm LLE.  Patient will benefit from treatment to address functional deficits.     Plan: Continue per plan of care.      Insurance:  AMA/CMS Eval/ Re-eval POC expires FOTO Auth #/ Referral # Total    Start date  Expiration date Extension  Visit limitation?  PT only or  PT+OT? Co-Insurance   CMS 3.26.24 6.11.24  needed                          AUTH #:  Date 3.26 4.4             Authed: Used 1 2              Remaining  11 10                 Precautions: decreased hearing, hx of Lyme disease  Patient provided verbal consent to treatment plan and recommended interventions.    DOS: Left ERICK 4/1/24    Manuals 3.26 4.4           visit 1 2                                                  Neuro Re-Ed                                                                 Ther Ex             LAQ  3*10           SAQ             Supine hip abd             Quad set  2*10           Glute set  HEP           Ankle pumps  HEP           Nu step step st 8  6' lvl 1           Stand hip abd  2*10 ea.           Step up  2*10, 4\" LLE                                                  Ther Activity             Pt ed FB FB                        Gait Training             Stairs/car FB FB                        Modalities                                            "

## 2024-04-09 ENCOUNTER — OFFICE VISIT (OUTPATIENT)
Dept: PHYSICAL THERAPY | Facility: CLINIC | Age: 59
End: 2024-04-09
Payer: COMMERCIAL

## 2024-04-09 DIAGNOSIS — M25.552 LEFT HIP PAIN: ICD-10-CM

## 2024-04-09 DIAGNOSIS — M16.12 PRIMARY OSTEOARTHRITIS OF LEFT HIP: Primary | ICD-10-CM

## 2024-04-09 PROCEDURE — 97110 THERAPEUTIC EXERCISES: CPT | Performed by: PHYSICAL THERAPIST

## 2024-04-09 NOTE — PROGRESS NOTES
"Daily Note     Today's date: 2024  Patient name: Jen Clark  : 1965  MRN: [id number]  Referring provider: Blaze Gallegos DO  Dx:   Encounter Diagnosis     ICD-10-CM    1. Primary osteoarthritis of left hip  M16.12       2. Left hip pain  M25.552                  Subjective: Patient reports ambulating with SPC at this time. Using no AD at times.   1 on 1 with PT for 23 minutes. Remaining time independent fitness program.    Objective: See treatment diary below    Assessment: Tolerated treatment well. Patient  reported left leg discomfort when performing standing hip abduction to the right, reporting feeling like leg was going to give out. Bandage removed with good incisional healing present.     Plan: Continue per plan of care.      Insurance:  AMA/CMS Eval/ Re-eval POC expires FOTO Auth #/ Referral # Total    Start date  Expiration date Extension  Visit limitation?  PT only or  PT+OT? Co-Insurance   CMS 3.26.24 6.11.24  needed                          AUTH #:  Date 3.26 4.4 4.9            Authed: Used 1 2 3             Remaining  11 10 9                Precautions: decreased hearing, hx of Lyme disease  Patient provided verbal consent to treatment plan and recommended interventions.    DOS: Left ERICK 24    Manuals 3. 4.4 4.9          visit 1 2 3                                                 Neuro Re-Ed                                                                 Ther Ex             LAQ  3*10           Nu step step st 8  6' lvl 1 6' lvl 1          Stand hip abd  2*10 ea. 2*10 ea.          Stand hip extn   2*10 ea.          Step up  2*10, 4\" LLE 2*10, 6\" LLE          squats   3*10          Side stepping   4 laps 12'          Supine march   3*10          SLR flexion   2*8 PT assist                                    Ther Activity             Pt ed FB FB                        Gait Training             Stairs/car FB FB                        Modalities                                            "

## 2024-04-11 ENCOUNTER — TELEPHONE (OUTPATIENT)
Age: 59
End: 2024-04-11

## 2024-04-11 ENCOUNTER — OFFICE VISIT (OUTPATIENT)
Dept: PHYSICAL THERAPY | Facility: CLINIC | Age: 59
End: 2024-04-11
Payer: COMMERCIAL

## 2024-04-11 DIAGNOSIS — M25.552 LEFT HIP PAIN: ICD-10-CM

## 2024-04-11 DIAGNOSIS — M16.12 PRIMARY OSTEOARTHRITIS OF LEFT HIP: Primary | ICD-10-CM

## 2024-04-11 PROCEDURE — 97112 NEUROMUSCULAR REEDUCATION: CPT | Performed by: PHYSICAL THERAPIST

## 2024-04-11 PROCEDURE — 97110 THERAPEUTIC EXERCISES: CPT | Performed by: PHYSICAL THERAPIST

## 2024-04-11 NOTE — TELEPHONE ENCOUNTER
Spoke to patient. All questions answered, she reports burning/firey sensation, worsened with ICING. We discussed rest and elevation for swelling control, and no creams/ointments/lotions near incision but can be used on other areas of the lower extremity.     pt encouraged to call me with questions, concerns or issues.

## 2024-04-11 NOTE — PROGRESS NOTES
"Daily Note     Today's date: 2024  Patient name: Jen Clark  : 1965  MRN: [id number]  Referring provider: Blaze Gallegos DO  Dx:   Encounter Diagnosis     ICD-10-CM    1. Primary osteoarthritis of left hip  M16.12       2. Left hip pain  M25.552                Subjective: Patient reports having trouble sleeping last night. Reports walking slightly more without AD. 1/2 day with AD. Skin sensitivity reported at incision and fully weightbearing onto LLE.     Objective: See treatment diary below    Assessment: Tolerated treatment well. Patient educated on scar desensitization and not to use ointment at this time. Able to perform reciprocal stair navigation ascending today.     Plan: Continue per plan of care.      Insurance:  AMA/CMS Eval/ Re-eval POC expires FOTO Auth #/ Referral # Total    Start date  Expiration date Extension  Visit limitation?  PT only or  PT+OT? Co-Insurance   CMS 3.26.24 6.11.24  needed                          AUTH #:  Date 3.26 4.4 4.9 4.11           Authed: Used 1 2 3 4            Remaining  11 10 9 8               Precautions: decreased hearing, hx of Lyme disease  Patient provided verbal consent to treatment plan and recommended interventions.    DOS: Left ERICK 24    Manuals 3.26 4.4 4.9 4.11         visit 1 2 3 4                                                Neuro Re-Ed             bridge    2*10         Clam shell    2*10                                   Ther Ex             LAQ  3*10           Nu step step st 8  6' lvl 1 6' lvl 1 6' lvl 2         Stand hip abd  2*10 ea. 2*10 ea. 3*10 ea.         Stand hip extn   2*10 ea. 3*10 ea.         Step up  2*10, 4\" LLE 2*10, 6\" LLE 3*10, 6\" LLE         squats   3*10 3*10         Side stepping   4 laps 12' 4 laps, 12'         Supine march   3*10 3*10         SLR flexion   2*8 PT assist 3*10 PT assist                                   Ther Activity             Pt ed FB FB                        Gait Training             Stairs/car " FB FB                        Modalities

## 2024-04-11 NOTE — TELEPHONE ENCOUNTER
Caller: Patient    Doctor: Rosy    Reason for call: Patient is having hypersensitivity of her skin. Is unable to have a sheet touch her skin. Should she ice? Apply lotion? Please cb to advise    Call back#: [phone]

## 2024-04-16 ENCOUNTER — OFFICE VISIT (OUTPATIENT)
Dept: PHYSICAL THERAPY | Facility: CLINIC | Age: 59
End: 2024-04-16
Payer: COMMERCIAL

## 2024-04-16 DIAGNOSIS — M25.552 LEFT HIP PAIN: ICD-10-CM

## 2024-04-16 DIAGNOSIS — M16.12 PRIMARY OSTEOARTHRITIS OF LEFT HIP: Primary | ICD-10-CM

## 2024-04-16 PROCEDURE — 97112 NEUROMUSCULAR REEDUCATION: CPT

## 2024-04-16 PROCEDURE — 97110 THERAPEUTIC EXERCISES: CPT

## 2024-04-16 NOTE — PROGRESS NOTES
"Daily Note     Today's date: 2024  Patient name: Jen Clark  : 1965  MRN: [id number]  Referring provider: Blaze Gallegos DO  Dx:   Encounter Diagnosis     ICD-10-CM    1. Primary osteoarthritis of left hip  M16.12       2. Left hip pain  M25.552       Start Time: 1415  Stop Time: 1500  Total time in clinic (min): 45 minutes  Subjective: \"I'm feeling pretty good.\" Reports improving sensitivity about incision.       Objective: See treatment diary below      Assessment: Tolerated treatment well. Initiated standing hip flexor stretch during today's session following reports of anterior tightness, ale well. Cont with functional standing progressions during today's session, demo good motor control with initiation of fwd step downs. Able to perform SLR today w/o quad lag. Skin check performed today, negative for redness/drainage. Patient will continue to benefit from skilled PT to improve functional mobility and activity tolerance.      Plan: Continue per plan of care.  Progress per primary PT nv.     Insurance:  AMA/CMS Eval/ Re-eval POC expires FOTO Auth #/ Referral # Total    Start date  Expiration date Extension  Visit limitation?  PT only or  PT+OT? Co-Insurance   CMS 3.26.24 6.11.24  needed                          AUTH #:  Date 3.26 4.4 4.9 4.11 4.16          Authed: Used 1 2 3 4 5           Remaining  11 10 9 8 7              Precautions: decreased hearing, hx of Lyme disease  Patient provided verbal consent to treatment plan and recommended interventions.    DOS: Left ERICK 24    Manuals 3. 4.4 4.9 4.11 4.16        visit 1 2 3 4 5                                               Neuro Re-Ed             Bridge    2*10 3x10        Clam shell    2*10 3x10        Step downs     6\" 3x8        Exaggerated walking     15 feet, 8 laps                                  Ther Ex             LAQ  3*10           Nu step step st 8  6' lvl 1 6' lvl 1 6' lvl 2 Level 3, 6 min        Stand hip abd  2*10 ea. 2*10 ea. " "3*10 ea. 3x10 each        Stand hip extn   2*10 ea. 3*10 ea. 3x10 each        Step up  2*10, 4\" LLE 2*10, 6\" LLE 3*10, 6\" LLE 6\" 3x1        squats   3*10 3*10 3x10        Side stepping   4 laps 12' 4 laps, 12' 15 feet, 8 laps        Supine march   3*10 3*10 3x10        SLR flexion   2*8 PT assist 3*10 PT assist AROM 3x8        Stand hip flexor stretch     10\" x5                     Ther Activity             Pt ed FB FB                        Gait Training             Stairs/car FB FB                        Modalities                                              "

## 2024-04-17 ENCOUNTER — OFFICE VISIT (OUTPATIENT)
Dept: OBGYN CLINIC | Facility: CLINIC | Age: 59
End: 2024-04-17

## 2024-04-17 ENCOUNTER — APPOINTMENT (OUTPATIENT)
Dept: RADIOLOGY | Facility: CLINIC | Age: 59
End: 2024-04-17
Payer: COMMERCIAL

## 2024-04-17 VITALS
DIASTOLIC BLOOD PRESSURE: 79 MMHG | BODY MASS INDEX: 29.89 KG/M2 | HEART RATE: 87 BPM | WEIGHT: 186 LBS | SYSTOLIC BLOOD PRESSURE: 121 MMHG | HEIGHT: 66 IN

## 2024-04-17 DIAGNOSIS — Z96.642 STATUS POST TOTAL REPLACEMENT OF LEFT HIP: ICD-10-CM

## 2024-04-17 DIAGNOSIS — Z47.1 AFTERCARE FOLLOWING LEFT HIP JOINT REPLACEMENT SURGERY: ICD-10-CM

## 2024-04-17 DIAGNOSIS — Z96.642 AFTERCARE FOLLOWING LEFT HIP JOINT REPLACEMENT SURGERY: ICD-10-CM

## 2024-04-17 DIAGNOSIS — Z96.642 STATUS POST TOTAL REPLACEMENT OF LEFT HIP: Primary | ICD-10-CM

## 2024-04-17 PROCEDURE — 99024 POSTOP FOLLOW-UP VISIT: CPT | Performed by: ORTHOPAEDIC SURGERY

## 2024-04-17 PROCEDURE — 73502 X-RAY EXAM HIP UNI 2-3 VIEWS: CPT

## 2024-04-17 NOTE — PROGRESS NOTES
Assessment/Plan:  1. Status post total replacement of left hip  XR hip/pelv 2-3 vws left if performed      2. Aftercare following left hip joint replacement surgery  XR hip/pelv 2-3 vws left if performed        Scribe Attestation      I,:  Wanda Rincon am acting as a scribe while in the presence of the attending physician.:       I,:  Blaze Gallegos, DO personally performed the services described in this documentation    as scribed in my presence.:           Jen is a very pleasant 58-year-old female presents to the office today for follow-up evaluation 2 weeks status post left total hip arthroplasty.  She should continue taking aspirin twice daily for 4 additional weeks. She may shower now; however, she should avoid standing water. She may begin driving at this time, as she is not taking any oxycodone. The paresthesias of her lateral left thigh is to be expected as the nerve sensation continues to return. She will follow-up in 4 weeks for re-evaluation of the left hip.    Subjective: Follow-up evaluation 2 weeks status post left total hip arthroplasty    Patient ID: Jen Clark is a 58 y.o. female who presents to the office for follow-up evaluation 2 weeks status post left total hip arthroplasty. She reports experiencing some stiffness in the morning, as well as standing from a seated position. She reports a soreness in addition to the stiffness when bearing weight on the left leg. She states the muscles in her thigh feel tight. She has been attending physical therapy, which she feels is going well. She has been taking Asprin for DVT prophylaxis and Celebrex for swelling twice a day. She has not been taking oxycodone or tylenol at this time. She applies ice to the hip after physical therapy, which seems to help relieve her pain. She does report lateral numbness of the left thigh.    Review of Systems   Constitutional:  Positive for activity change. Negative for chills and fever.   HENT:  Negative for ear pain and  sore throat.    Eyes:  Negative for pain and visual disturbance.   Respiratory:  Negative for cough and shortness of breath.    Cardiovascular:  Negative for chest pain and palpitations.   Gastrointestinal:  Negative for abdominal pain and vomiting.   Genitourinary:  Negative for dysuria and hematuria.   Musculoskeletal:  Positive for arthralgias. Negative for back pain.   Skin:  Negative for color change and rash.   Neurological:  Negative for seizures and syncope.   All other systems reviewed and are negative.        Past Medical History:   Diagnosis Date    Arthralgia of right acromioclavicular joint     Lyme disease     Postmenopausal bleeding     Situational anxiety     Small bowel obstruction (HCC)     Strain of deltoid muscle, left, initial encounter        Past Surgical History:   Procedure Laterality Date    COLONOSCOPY  2013    DILATION AND CURETTAGE OF UTERUS      OVARIAN CYST REMOVAL  1995    MD ARTHRP ACETBLR/PROX FEM PROSTC AGRFT/ALGRFT Left 4/1/2024    Procedure: ARTHROPLASTY HIP TOTAL ANTERIOR,NAVIGATED - same day;  Surgeon: Blaze Gallegos DO;  Location: [facility];  Service: Orthopedics    MD LAPS ABD PRTM&OMENTUM DX W/WO SPEC BR/WA SPX N/A 2/21/2021    Procedure: LAPAROSCOPY DIAGNOSTIC, exploratory laparotomy, lysis of adhesions, REDUCTION OF INTERNAL HERNIA;  Surgeon: Cris Luevano MD;  Location: [facility];  Service: General       Family History   Problem Relation Age of Onset    Atrial fibrillation Mother     Hyperlipidemia Mother     Cancer Mother     Heart disease Father     Hypertension Father     Hyperlipidemia Father     Diabetes Father     No Known Problems Sister     No Known Problems Maternal Grandmother     Prostate cancer Maternal Grandfather 50        Richard Prattsheri    No Known Problems Paternal Grandmother     Diabetes Brother     No Known Problems Maternal Aunt     No Known Problems Maternal Aunt     No Known Problems Maternal Aunt     Prostate cancer Maternal Uncle 59    Breast  cancer Paternal Aunt 35    No Known Problems Paternal Aunt        Social History     Occupational History    Not on file   Tobacco Use    Smoking status: Never    Smokeless tobacco: Never   Vaping Use    Vaping status: Never Used   Substance and Sexual Activity    Alcohol use: Yes     Alcohol/week: 2.0 standard drinks of alcohol     Types: 2 Cans of beer per week     Comment: occasional    Drug use: Never    Sexual activity: Yes     Partners: Male     Birth control/protection: Post-menopausal         Current Outpatient Medications:     aspirin 325 mg tablet, Take 1 tablet (325 mg total) by mouth 2 (two) times a day, Disp: 84 tablet, Rfl: 0    celecoxib (CeleBREX) 200 mg capsule, TAKE 1 CAPSULE TWICE A DAY, Disp: 180 capsule, Rfl: 0    Iron-Vitamin C 100-250 MG TABS, Take by mouth 3 (three) times a week, Disp: , Rfl:     Multiple Vitamin (MULTI-VITAMIN DAILY) TABS, Take 1 tablet by mouth daily, Disp: , Rfl:     pramipexole (MIRAPEX) 0.25 mg tablet, Take 1 tablet once a day after dinner, Disp: 90 tablet, Rfl: 3    Sodium Fluoride 5000 PPM 1.1 % PSTE, , Disp: , Rfl:     tretinoin (RETIN-A) 0.025 % cream, Apply thin film topically once daily, Disp: 45 g, Rfl: 3    acetaminophen (TYLENOL) 325 mg tablet, Take 3 tablets (975 mg total) by mouth every 8 (eight) hours (Patient not taking: Reported on 4/17/2024), Disp: , Rfl:     Calcium Carb-Cholecalciferol 1000-800 MG-UNIT TABS, Take by mouth daily   (Patient not taking: Reported on 4/17/2024), Disp: , Rfl:     docusate sodium (COLACE) 100 mg capsule, Take 1 capsule (100 mg total) by mouth 2 (two) times a day (Patient not taking: Reported on 4/17/2024), Disp: 60 capsule, Rfl: 0    gabapentin (NEURONTIN) 300 mg capsule, Take 1 capsule at bedtime (Patient not taking: Reported on 4/17/2024), Disp: 90 capsule, Rfl: 3    Insulin Pen Needle 34G X 3.5 MM MISC, Use with Zepbound injections once a week (Patient not taking: Reported on 4/17/2024), Disp: 4 each, Rfl: 11    LORazepam  (ATIVAN) 0.5 mg tablet, Take 1 tablet (0.5 mg total) by mouth as needed for anxiety (Launch) Take as need for dental work (Patient not taking: Reported on 4/17/2024), Disp: 30 tablet, Rfl: 0    naloxone (NARCAN) 4 mg/0.1 mL nasal spray, Administer 1 spray into a nostril. If no response after 2-3 minutes, give another dose in the other nostril using a new spray., Disp: 1 each, Rfl: 1    NON FORMULARY, Take 1 each by mouth daily NUTRIM OAT POWDER-SPRINKLE OVER FOOD ONCE A DAY (Patient not taking: Reported on 4/17/2024), Disp: , Rfl:     oxyCODONE (Roxicodone) 5 immediate release tablet, Take 1-2 tablets by mouth every 6 hours as needed for pain (Patient not taking: Reported on 4/17/2024), Disp: 40 tablet, Rfl: 0    valACYclovir (VALTREX) 1,000 mg tablet, Take 2 tablets twice a day as needed for cold sore x 1 day., Disp: 4 tablet, Rfl: 2    Allergies   Allergen Reactions    Codeine Dizziness       Objective:  Vitals:    04/17/24 0702   BP: 121/79   Pulse: 87       Body mass index is 30.02 kg/m².    Left Hip Exam     Tenderness   The patient is experiencing no tenderness.     Range of Motion   Abduction:  50   Adduction:  30   Extension:  0   Flexion:  100   Left hip external rotation: 45.   Internal rotation: 30     Muscle Strength   Abduction: 5/5   Adduction: 5/5   Flexion: 5/5     Tests   ESA: negative  [place]: negative    Other   Erythema: absent  Scars: present  Sensation: normal  Pulse: present    Comments:  Negative [facility]  Thigh and calf soft and nontender  SILT L2-S1  Anterior surgical scar healing well and well-approximated              Physical Exam  Vitals and nursing note reviewed.   Constitutional:       Appearance: Normal appearance.   HENT:      Head: Normocephalic and atraumatic.      Right Ear: External ear normal.      Left Ear: External ear normal.      Nose: Nose normal.   Eyes:      General: No scleral icterus.     Extraocular Movements: Extraocular movements intact.      Conjunctiva/sclera:  Conjunctivae normal.   Cardiovascular:      Rate and Rhythm: Normal rate.   Pulmonary:      Effort: Pulmonary effort is normal. No respiratory distress.   Musculoskeletal:      Cervical back: Normal range of motion and neck supple.      Comments: See ortho exam   Skin:     General: Skin is warm and dry.   Neurological:      Mental Status: She is alert and oriented to person, place, and time.   Psychiatric:         Mood and Affect: Mood normal.         Behavior: Behavior normal.         I have personally reviewed pertinent films in PACS.  X-rays of the left hip obtained in the office today demonstrate well-positioned, well aligned total hip prosthesis.  There is no evidence of fracture or failure present.    This document was created using speech voice recognition software.   Grammatical errors, random word insertions, pronoun errors, and incomplete sentences are an occasional consequence of this system due to software limitations, ambient noise, and hardware issues.   Any formal questions or concerns about content, text, or information contained within the body of this dictation should be directly addressed to the provider for clarification.

## 2024-04-18 ENCOUNTER — OFFICE VISIT (OUTPATIENT)
Dept: PHYSICAL THERAPY | Facility: CLINIC | Age: 59
End: 2024-04-18
Payer: COMMERCIAL

## 2024-04-18 DIAGNOSIS — M25.552 LEFT HIP PAIN: ICD-10-CM

## 2024-04-18 DIAGNOSIS — M16.12 PRIMARY OSTEOARTHRITIS OF LEFT HIP: Primary | ICD-10-CM

## 2024-04-18 PROCEDURE — 97110 THERAPEUTIC EXERCISES: CPT | Performed by: PHYSICAL THERAPIST

## 2024-04-18 PROCEDURE — 97112 NEUROMUSCULAR REEDUCATION: CPT | Performed by: PHYSICAL THERAPIST

## 2024-04-18 NOTE — PROGRESS NOTES
"Daily Note     Today's date: 2024  Patient name: Jen Clark  : 1965  MRN: [id number]  Referring provider: Blaze Gallegos DO  Dx:   Encounter Diagnosis     ICD-10-CM    1. Primary osteoarthritis of left hip  M16.12       2. Left hip pain  M25.552                Subjective: Reports that she got a good report from referring provider. Reports that she was cleared to drive and shower.     Objective: See treatment diary below    Assessment: Tolerated treatment well. Patient reported feeling good post session. No reporting of discomfort during session.     Plan: Continue per plan of care.       Insurance:  AMA/CMS Eval/ Re-eval POC expires FOTO Auth #/ Referral # Total    Start date  Expiration date Extension  Visit limitation?  PT only or  PT+OT? Co-Insurance   CMS 3.26.24 6.11.24  needed                          AUTH #:  Date 3.26 4.4 4.9 4.11 4.16 4.18         Authed: Used 1 2 3 4 5 6          Remaining  11 10 9 8 7 6             Precautions: decreased hearing, hx of Lyme disease  Patient provided verbal consent to treatment plan and recommended interventions.    DOS: Left ERICK 24    Manuals 3. 4.4 4.9 4.11 4.16 4.18       visit 1 2 3 4 5 6                                              Neuro Re-Ed             Bridge    2*10 3x10  HEP      Clam shell    2*10 3x10        Step downs     6\" 3x8  HEP      Exaggerated walking     15 feet, 8 laps 15 feet, 8 laps       Lateral step ups      2*10 BOSU                    Ther Ex             Nu step step st 8  6' lvl 1 6' lvl 1 6' lvl 2 Level 3, 6 min 6' lvl 3       Stand hip abd  2*10 ea. 2*10 ea. 3*10 ea. 3x10 each 2*15 ea.       Stand hip extn   2*10 ea. 3*10 ea. 3x10 each 2*15 ea.       Step up  2*10, 4\" LLE 2*10, 6\" LLE 3*10, 6\" LLE 6\" 3x10 D/C       squats   3*10 3*10 3x10 3*10       Side stepping   4 laps 12' 4 laps, 12' 15 feet, 8 laps 15 feet, 6 laps HEP      Supine march   3*10 3*10 3x10        SLR flexion   2*8 PT assist 3*10 PT assist AROM 3x8  HEP  " "    Stand hip flexor stretch     10\" x5 5*10''       Stand hip flexion      2*10 ea.       Lateral step up      3*10, 6\"                    Ther Activity             Pt ed FB FB                        Gait Training             Stairs/car FB FB                        Modalities                                              "

## 2024-04-22 ENCOUNTER — OFFICE VISIT (OUTPATIENT)
Dept: PHYSICAL THERAPY | Facility: CLINIC | Age: 59
End: 2024-04-22
Payer: COMMERCIAL

## 2024-04-22 DIAGNOSIS — M25.552 LEFT HIP PAIN: ICD-10-CM

## 2024-04-22 DIAGNOSIS — M16.12 PRIMARY OSTEOARTHRITIS OF LEFT HIP: Primary | ICD-10-CM

## 2024-04-22 PROCEDURE — 97112 NEUROMUSCULAR REEDUCATION: CPT | Performed by: PHYSICAL THERAPIST

## 2024-04-22 PROCEDURE — 97110 THERAPEUTIC EXERCISES: CPT | Performed by: PHYSICAL THERAPIST

## 2024-04-22 NOTE — PROGRESS NOTES
"Daily Note     Today's date: 2024  Patient name: Jen Clark  : 1965  MRN: [id number]  Referring provider: Blaze Gallegos DO  Dx:   Encounter Diagnosis     ICD-10-CM    1. Primary osteoarthritis of left hip  M16.12       2. Left hip pain  M25.552                Subjective: Patient reports that she felt good after last treatment. Reports that she is doing well entering treatment. Reports that her legs are restless as she has restless leg symptoms. Hard time occasionally putting on sock and shoe.  1 on 1 with PT for 23 minutes. Remaining time independent fitness program.    Objective: See treatment diary below    Assessment: Tolerated treatment well. Patient demonstrates improved gait pattern compared to beginning therapy. Slight ache reported post session today. Improved hip flexion passively as time transpired during manual stretching.     Plan: Continue per plan of care.       Insurance:  AMA/CMS Eval/ Re-eval POC expires FOTO Auth #/ Referral # Total    Start date  Expiration date Extension  Visit limitation?  PT only or  PT+OT? Co-Insurance   CMS 3.26.24 6.11.24  needed                          AUTH #:  Date 3.26 4.4 4.9 4.11 4.16 4.18 4.22        Authed: Used 1 2 3 4 5 6 7         Remaining  11 10 9 8 7 6 5            Precautions: decreased hearing, hx of Lyme disease  Patient provided verbal consent to treatment plan and recommended interventions.    DOS: Left ERICK 24    Manuals 3. 4.4 4.9 4.11 4.16 4.18 4.22      visit 1 2 3 4 5 6 7                                             Neuro Re-Ed             Bridge    2*10 3x10  HEP      Clam shell    2*10 3x10        Step downs     6\" 3x8  HEP      Exaggerated walking     15 feet, 8 laps 15 feet, 8 laps 15 feet, 8 laps      Lateral step ups      2*10 BOSU 2*10 BOSU      Ant step up       2*10 BOSU LLE                   Ther Ex             Nu step step st 8  6' lvl 1 6' lvl 1 6' lvl 2 Level 3, 6 min 6' lvl 3 NP      Stand hip abd  2*10 ea. 2*10 ea. " "3*10 ea. 3x10 each 2*15 ea. HEP      Stand hip extn   2*10 ea. 3*10 ea. 3x10 each 2*15 ea. HEP      Step up  2*10, 4\" LLE 2*10, 6\" LLE 3*10, 6\" LLE 6\" 3x10 D/C       squats   3*10 3*10 3x10 3*10 3*10      Side stepping   4 laps 12' 4 laps, 12' 15 feet, 8 laps 15 feet, 6 laps 15 feet, 8 laps      Supine march   3*10 3*10 3x10  D/C      SLR flexion   2*8 PT assist 3*10 PT assist AROM 3x8  HEP      Stand hip flexor stretch     10\" x5 5*10'' 2*10, 5\"      Stand hip flexion      2*10 ea. 3*10 LLE      Lateral step up      3*10, 6\" 3*10, 6\"      CC reverse walk       2*10, 22.5#      Hip PROM       FB-flexion                   Ther Activity             Pt ed FB FB                        Gait Training             Stairs/car FB FB                        Modalities                                              "

## 2024-04-24 ENCOUNTER — APPOINTMENT (OUTPATIENT)
Dept: PHYSICAL THERAPY | Facility: CLINIC | Age: 59
End: 2024-04-24
Payer: COMMERCIAL

## 2024-04-25 ENCOUNTER — OFFICE VISIT (OUTPATIENT)
Dept: PHYSICAL THERAPY | Facility: CLINIC | Age: 59
End: 2024-04-25
Payer: COMMERCIAL

## 2024-04-25 DIAGNOSIS — M25.552 LEFT HIP PAIN: ICD-10-CM

## 2024-04-25 DIAGNOSIS — M16.12 PRIMARY OSTEOARTHRITIS OF LEFT HIP: Primary | ICD-10-CM

## 2024-04-25 PROCEDURE — 97112 NEUROMUSCULAR REEDUCATION: CPT | Performed by: PHYSICAL THERAPIST

## 2024-04-25 PROCEDURE — 97110 THERAPEUTIC EXERCISES: CPT | Performed by: PHYSICAL THERAPIST

## 2024-04-25 NOTE — PROGRESS NOTES
"Daily Note     Today's date: 2024  Patient name: Jen Clark  : 1965  MRN: [id number]  Referring provider: Blaze Gallegos DO  Dx: No diagnosis found.               Subjective: Pt reports mild pain in anterior R hip and abdomen. At this time, the pain does not warrant immediate concern. Pt was advised to monitor pain and call PCP if changes.     Objective: See treatment diary below      Assessment: Incorporated addition LE strength exercises including step downs and leg press which she tolerated well with no reported pain. Pt inquired about walking her dog. Pt was encouraged to increase daily exercise and include walks. Pt was educated on staying safe while walking outdoors. Pt also inquired about medication and was advised to call her PCP or ortho surgeon to discuss medications. At the end of session, Pt stated that the pain in her R hip had subsided. Plan to continue progressing patient as tolerated.     Plan: Continue per plan of care.      Insurance:  AMA/CMS Eval/ Re-eval POC expires FOTO Auth #/ Referral # Total    Start date  Expiration date Extension  Visit limitation?  PT only or  PT+OT? Co-Insurance   CMS 3.26.24 6.11.24  needed                          AUTH #:  Date 3.26 4.4 4.9 4.11 4.16 4.18 4.22        Authed: Used 1 2 3 4 5 6 7         Remaining  11 10 9 8 7 6 5            Precautions: decreased hearing, hx of Lyme disease  Patient provided verbal consent to treatment plan and recommended interventions.    DOS: Left ERICK 24    Manuals 3. 4.4 4.9 4.11 4.16 4.18 4.22 4.25     visit 1 2 3 4 5 6 7 8                                            Neuro Re-Ed             Bridge    2*10 3x10  HEP      Clam shell    2*10 3x10        Step downs     6\" 3x8  HEP      Exaggerated walking     15 feet, 8 laps 15 feet, 8 laps 15 feet, 8 laps      Lateral step ups      2*10 BOSU 2*10 BOSU      Ant step up       2*10 BOSU LLE      Eccentric step downs        3x10                  Ther Ex             Nu " "step step st 8  6' lvl 1 6' lvl 1 6' lvl 2 Level 3, 6 min 6' lvl 3 NP      Stand hip abd  2*10 ea. 2*10 ea. 3*10 ea. 3x10 each 2*15 ea. HEP      Stand hip extn   2*10 ea. 3*10 ea. 3x10 each 2*15 ea. HEP      Step up  2*10, 4\" LLE 2*10, 6\" LLE 3*10, 6\" LLE 6\" 3x10 D/C       squats   3*10 3*10 3x10 3*10 3*10      Side stepping   4 laps 12' 4 laps, 12' 15 feet, 8 laps 15 feet, 6 laps 15 feet, 8 laps RTB  15\"  8 laps     Supine march   3*10 3*10 3x10  D/C      SLR flexion   2*8 PT assist 3*10 PT assist AROM 3x8  HEP      Stand hip flexor stretch     10\" x5 5*10'' 2*10, 5\" 2x10 5\"     Stand hip flexion      2*10 ea. 3*10 LLE      Lateral step up      3*10, 6\" 3*10, 6\" 3X10 6\"     CC reverse walk       2*10, 22.5# 3x10 22.5     Hip PROM       FB-flexion MR- flex     Leg press        65# 2x10                  Ther Activity             Pt ed FB FB      MR                  Gait Training             Stairs/car FB FB                        Modalities                                                "

## 2024-04-26 ENCOUNTER — TELEPHONE (OUTPATIENT)
Age: 59
End: 2024-04-26

## 2024-04-26 NOTE — TELEPHONE ENCOUNTER
Caller: Patient    Doctor: Rosy    Reason for call: Questioned if she could cut down on the Celebrex? It causes gastro issues    Call back#: [phone]

## 2024-05-01 ENCOUNTER — OFFICE VISIT (OUTPATIENT)
Dept: PHYSICAL THERAPY | Facility: CLINIC | Age: 59
End: 2024-05-01
Payer: COMMERCIAL

## 2024-05-01 DIAGNOSIS — M25.552 LEFT HIP PAIN: ICD-10-CM

## 2024-05-01 DIAGNOSIS — M16.12 PRIMARY OSTEOARTHRITIS OF LEFT HIP: Primary | ICD-10-CM

## 2024-05-01 PROCEDURE — 97110 THERAPEUTIC EXERCISES: CPT

## 2024-05-01 NOTE — PROGRESS NOTES
"Daily Note     Today's date: 2024  Patient name: Jen Clark  : 1965  MRN: [id number]  Referring provider: Blaze Gallegos DO  Dx:   Encounter Diagnosis     ICD-10-CM    1. Primary osteoarthritis of left hip  M16.12       2. Left hip pain  M25.552           Start Time: 1015  Stop Time: 1100  Total time in clinic (min): 45 minutes    Subjective: Patient has soreness down into her L upper thigh today.     Objective: See treatment diary below    Warmup NuStep 10' prior to session   Circuit 1, 3 rounds, 30 seconds on, 30 seconds off  SLB with cone taps   Lateral bosu walkovers   Stand to half kneel   Circuit 2, 3 rounds, 30 seconds on, 30 seconds off  Step ups with alternating hip drive with OH press   KB SL RDL  Real walking forward   Circuit 3, 3 rounds, 30 seconds on, 30 seconds off  Tandem walking on foam backwards and forwards   Tidal tank lunging with twist   KB squats    Assessment: Patient demonstrates good form during today's session. She was highly challenged with hurdles due to height of hurdles. Will continue to progress as tolerated.    Plan: Continue per plan of care.      Insurance:  AMA/CMS Eval/ Re-eval POC expires FOTO Auth #/ Referral # Total    Start date  Expiration date Extension  Visit limitation?  PT only or  PT+OT? Co-Insurance   CMS 3.26.24 6.11.24  needed                          AUTH #:  Date 3.26 4.4 4.9 4.11 4.16 4.18 4.22        Authed: Used 1 2 3 4 5 6 7         Remaining  11 10 9 8 7 6 5            Precautions: decreased hearing, hx of Lyme disease  Patient provided verbal consent to treatment plan and recommended interventions.    DOS: Left ERICK 24    Manuals 3.26 4.4 4.9 4.11 4.16 4.18 4.22 4.25     visit 1 2 3 4 5 6 7 8                                            Neuro Re-Ed             Bridge    2*10 3x10  HEP      Clam shell    2*10 3x10        Step downs     6\" 3x8  HEP      Exaggerated walking     15 feet, 8 laps 15 feet, 8 laps 15 feet, 8 laps      Lateral step " "ups      2*10 BOSU 2*10 BOSU      Ant step up       2*10 BOSU LLE      Eccentric step downs        3x10                  Ther Ex             Nu step step st 8  6' lvl 1 6' lvl 1 6' lvl 2 Level 3, 6 min 6' lvl 3 NP      Stand hip abd  2*10 ea. 2*10 ea. 3*10 ea. 3x10 each 2*15 ea. HEP      Stand hip extn   2*10 ea. 3*10 ea. 3x10 each 2*15 ea. HEP      Step up  2*10, 4\" LLE 2*10, 6\" LLE 3*10, 6\" LLE 6\" 3x10 D/C       squats   3*10 3*10 3x10 3*10 3*10      Side stepping   4 laps 12' 4 laps, 12' 15 feet, 8 laps 15 feet, 6 laps 15 feet, 8 laps RTB  15\"  8 laps     Supine march   3*10 3*10 3x10  D/C      SLR flexion   2*8 PT assist 3*10 PT assist AROM 3x8  HEP      Stand hip flexor stretch     10\" x5 5*10'' 2*10, 5\" 2x10 5\"     Stand hip flexion      2*10 ea. 3*10 LLE      Lateral step up      3*10, 6\" 3*10, 6\" 3X10 6\"     CC reverse walk       2*10, 22.5# 3x10 22.5     Hip PROM       FB-flexion MR- flex     Leg press        65# 2x10                  Ther Activity             Pt ed FB FB      MR                  Gait Training             Stairs/car FB FB                        Modalities                                                "

## 2024-05-06 DIAGNOSIS — G25.81 RESTLESS LEG SYNDROME: ICD-10-CM

## 2024-05-06 RX ORDER — PRAMIPEXOLE DIHYDROCHLORIDE 0.25 MG/1
TABLET ORAL
Qty: 90 TABLET | Refills: 3 | Status: SHIPPED | OUTPATIENT
Start: 2024-05-06

## 2024-05-08 ENCOUNTER — OFFICE VISIT (OUTPATIENT)
Dept: PHYSICAL THERAPY | Facility: CLINIC | Age: 59
End: 2024-05-08
Payer: COMMERCIAL

## 2024-05-08 DIAGNOSIS — M16.12 PRIMARY OSTEOARTHRITIS OF LEFT HIP: Primary | ICD-10-CM

## 2024-05-08 DIAGNOSIS — M25.552 LEFT HIP PAIN: ICD-10-CM

## 2024-05-08 PROCEDURE — 97110 THERAPEUTIC EXERCISES: CPT

## 2024-05-08 NOTE — PROGRESS NOTES
Daily Note     Today's date: 2024  Patient name: Jen Clark  : 1965  MRN: [id number]  Referring provider: Blaze Gallegos DO  Dx:   Encounter Diagnosis     ICD-10-CM    1. Primary osteoarthritis of left hip  M16.12       2. Left hip pain  M25.552                      Subjective: Patient has no notable reports to begin session.      Objective: See treatment diary below    Warmup NuStep 10' prior to session   Circuit 1, 3 rounds, 30 seconds on, 30 seconds off  Med ball rainbow   Suitcase carries  Split stance D2 flexion with KB of choice  Circuit 2, 3 rounds, 30 seconds on, 30 seconds off  Wall sit with alternating leg extension  Reverse lunges off the step with hip drive  Modified hopscotch in ladder  Circuit 3, 3 rounds, 30 seconds on, 30 seconds off  Monster walks  KB cleans  Sit to stands on 18” box with 10# TT      Assessment: Tolerated treatment well. Patient struggled with coordination of MB rainbows, but had no complaints in regards to Hip. Patient seemed appropriately challenged by exercises and felt fatigued to end session. Patient demonstrated fatigue post treatment and would benefit from continued PT to address functional mobility deficits and return to PLOF.      Plan: Continue per plan of care.        Insurance:  AMA/CMS Eval/ Re-eval POC expires FOTO Auth #/ Referral # Total    Start date  Expiration date Extension  Visit limitation?  PT only or  PT+OT? Co-Insurance   CMS 3.26.24 6.11.24  needed                          AUTH #:  Date 3.26 4.4 4.9 4.11 4.16 4.18 4.22        Authed: Used 1 2 3 4 5 6 7         Remaining  11 10 9 8 7 6 5            Precautions: decreased hearing, hx of Lyme disease  Patient provided verbal consent to treatment plan and recommended interventions.    DOS: Left ERICK 24    Manuals 3.26 4.4 4.9 4.11 4.16 4.18 4.22 4.25     visit 1 2 3 4 5 6 7 8                                            Neuro Re-Ed             Bridge    2*10 3x10  HEP      Clam shell    2*10  "3x10        Step downs     6\" 3x8  HEP      Exaggerated walking     15 feet, 8 laps 15 feet, 8 laps 15 feet, 8 laps      Lateral step ups      2*10 BOSU 2*10 BOSU      Ant step up       2*10 BOSU LLE      Eccentric step downs        3x10                  Ther Ex             Nu step step st 8  6' lvl 1 6' lvl 1 6' lvl 2 Level 3, 6 min 6' lvl 3 NP      Stand hip abd  2*10 ea. 2*10 ea. 3*10 ea. 3x10 each 2*15 ea. HEP      Stand hip extn   2*10 ea. 3*10 ea. 3x10 each 2*15 ea. HEP      Step up  2*10, 4\" LLE 2*10, 6\" LLE 3*10, 6\" LLE 6\" 3x10 D/C       squats   3*10 3*10 3x10 3*10 3*10      Side stepping   4 laps 12' 4 laps, 12' 15 feet, 8 laps 15 feet, 6 laps 15 feet, 8 laps RTB  15\"  8 laps     Supine march   3*10 3*10 3x10  D/C      SLR flexion   2*8 PT assist 3*10 PT assist AROM 3x8  HEP      Stand hip flexor stretch     10\" x5 5*10'' 2*10, 5\" 2x10 5\"     Stand hip flexion      2*10 ea. 3*10 LLE      Lateral step up      3*10, 6\" 3*10, 6\" 3X10 6\"     CC reverse walk       2*10, 22.5# 3x10 22.5     Hip PROM       FB-flexion MR- flex     Leg press        65# 2x10                  Ther Activity             Pt ed FB FB      MR                  Gait Training             Stairs/car FB FB                        Modalities                                                  "

## 2024-05-15 ENCOUNTER — OFFICE VISIT (OUTPATIENT)
Dept: PHYSICAL THERAPY | Facility: CLINIC | Age: 59
End: 2024-05-15
Payer: COMMERCIAL

## 2024-05-15 DIAGNOSIS — M25.552 LEFT HIP PAIN: ICD-10-CM

## 2024-05-15 DIAGNOSIS — M16.12 PRIMARY OSTEOARTHRITIS OF LEFT HIP: Primary | ICD-10-CM

## 2024-05-15 PROCEDURE — 97110 THERAPEUTIC EXERCISES: CPT

## 2024-05-15 NOTE — PROGRESS NOTES
Daily Note     Today's date: 5/15/2024  Patient name: Jen Clark  : 1965  MRN: [id number]  Referring provider: Blaze Gallegos DO  Dx:   Encounter Diagnosis     ICD-10-CM    1. Primary osteoarthritis of left hip  M16.12       2. Left hip pain  M25.552                        Subjective: Patient has no notable reports to begin session.      Objective: See treatment diary below    Warmup NuStep 10' prior to session   Circuit 1, 3 rounds, 30 seconds on, 30 seconds off  Prieto ropes double arm slam  Lateral rell walking with foam   Ball squats on the wall   Circuit 2, 3 rounds, 30 seconds on, 30 seconds off  SL RDL with KB   SLB on foam with cone taps   Sled push/pull   Circuit 3, 3 rounds, 30 seconds on, 30 seconds off  3 way sliders   Med ball slams   Tandem walking on foam forward and backwards      Assessment: Tolerated treatment well. Patient demonstrated ease with SL strengthening activities. Patient inquired about resuming yoga and julio cesar classes. Advised her to resume gently with low impact activities, avoiding end range positions and remaining in a pain free range of motion. Patient demonstrated fatigue post treatment and would benefit from continued PT to address functional mobility deficits and return to PLOF.      Plan: Continue per plan of care.        Insurance:  AMA/CMS Eval/ Re-eval POC expires FOTO Auth #/ Referral # Total    Start date  Expiration date Extension  Visit limitation?  PT only or  PT+OT? Co-Insurance   CMS 3.26.24 6.11.24  needed                          AUTH #:  Date 3.26 4.4 4.9 4.11 4.16 4.18 4.22        Authed: Used 1 2 3 4 5 6 7         Remaining  11 10 9 8 7 6 5            Precautions: decreased hearing, hx of Lyme disease  Patient provided verbal consent to treatment plan and recommended interventions.    DOS: Left ERICK 24    Manuals 3. 4.4 4.9 4.11 4.16 4.18 4.22 4.25     visit 1 2 3 4 5 6 7 8                                            Neuro Re-Ed             Bridge   "  2*10 3x10  HEP      Clam shell    2*10 3x10        Step downs     6\" 3x8  HEP      Exaggerated walking     15 feet, 8 laps 15 feet, 8 laps 15 feet, 8 laps      Lateral step ups      2*10 BOSU 2*10 BOSU      Ant step up       2*10 BOSU LLE      Eccentric step downs        3x10                  Ther Ex             Nu step step st 8  6' lvl 1 6' lvl 1 6' lvl 2 Level 3, 6 min 6' lvl 3 NP      Stand hip abd  2*10 ea. 2*10 ea. 3*10 ea. 3x10 each 2*15 ea. HEP      Stand hip extn   2*10 ea. 3*10 ea. 3x10 each 2*15 ea. HEP      Step up  2*10, 4\" LLE 2*10, 6\" LLE 3*10, 6\" LLE 6\" 3x10 D/C       squats   3*10 3*10 3x10 3*10 3*10      Side stepping   4 laps 12' 4 laps, 12' 15 feet, 8 laps 15 feet, 6 laps 15 feet, 8 laps RTB  15\"  8 laps     Supine march   3*10 3*10 3x10  D/C      SLR flexion   2*8 PT assist 3*10 PT assist AROM 3x8  HEP      Stand hip flexor stretch     10\" x5 5*10'' 2*10, 5\" 2x10 5\"     Stand hip flexion      2*10 ea. 3*10 LLE      Lateral step up      3*10, 6\" 3*10, 6\" 3X10 6\"     CC reverse walk       2*10, 22.5# 3x10 22.5     Hip PROM       FB-flexion MR- flex     Leg press        65# 2x10                  Ther Activity             Pt ed FB FB      MR                  Gait Training             Stairs/car FB FB                        Modalities                                                  "

## 2024-05-16 ENCOUNTER — OFFICE VISIT (OUTPATIENT)
Dept: OBGYN CLINIC | Facility: CLINIC | Age: 59
End: 2024-05-16

## 2024-05-16 VITALS
DIASTOLIC BLOOD PRESSURE: 80 MMHG | SYSTOLIC BLOOD PRESSURE: 118 MMHG | WEIGHT: 186.2 LBS | HEART RATE: 82 BPM | BODY MASS INDEX: 29.92 KG/M2 | HEIGHT: 66 IN

## 2024-05-16 DIAGNOSIS — Z96.642 STATUS POST TOTAL REPLACEMENT OF LEFT HIP: Primary | ICD-10-CM

## 2024-05-16 DIAGNOSIS — Z96.642 AFTERCARE FOLLOWING LEFT HIP JOINT REPLACEMENT SURGERY: ICD-10-CM

## 2024-05-16 DIAGNOSIS — Z47.1 AFTERCARE FOLLOWING LEFT HIP JOINT REPLACEMENT SURGERY: ICD-10-CM

## 2024-05-16 PROCEDURE — 99024 POSTOP FOLLOW-UP VISIT: CPT | Performed by: ORTHOPAEDIC SURGERY

## 2024-05-16 NOTE — PROGRESS NOTES
Assessment/Plan:  1. Status post total replacement of left hip        2. Aftercare following left hip joint replacement surgery          Scribe Attestation      I,:  Wanda Rincon am acting as a scribe while in the presence of the attending physician.:       I,:  Blaze Gallegos, DO personally performed the services described in this documentation    as scribed in my presence.:           Jen is a pleasant 58 y.o. female who presents for follow-up evaluation 6 weeks status post left total hip arthroplasty. I am pleased with how well she is doing postoperatively. She may begin to resume her desired activities. She should continue her efforts with the [facility] program with physical therapy. She may discontinue use of the Asprin at this time, as well as the Celebrex. She may continue to take tylenol as needed for pain. She may resume taking baths at this time and using her hot tub in 2 weeks. She will follow-up in 6 weeks for re-evaluation of the left hip with repeat X-rays upon arrival.    Subjective: follow-up evaluation 6 weeks status post left total hip arthroplasty    Patient ID: Jen Clark is a 58 y.o. female who presents for follow-up evaluation 6 weeks status post left total hip arthroplasty. Today, she reports no significant pain about the left hip. She reports some stiffness in the morning. She is experiencing some sensitivity on the lateral thigh and near the incision. She has been attending physical therapy, which she feels has been going well. She has been doing workout circuits under the guidance of her physical therapist. She is easing back into physical activity. She has been able to walk her dog recently. She is still taking Celebrex, Asprin, and Tylenol.     Review of Systems   Constitutional:  Positive for activity change. Negative for chills and fever.   HENT:  Negative for ear pain and sore throat.    Eyes:  Negative for pain and visual disturbance.   Respiratory:  Negative for cough and shortness of  breath.    Cardiovascular:  Negative for chest pain and palpitations.   Gastrointestinal:  Negative for abdominal pain and vomiting.   Genitourinary:  Negative for dysuria and hematuria.   Musculoskeletal:  Negative for arthralgias and back pain.   Skin:  Negative for color change and rash.   Neurological:  Negative for seizures and syncope.   All other systems reviewed and are negative.        Past Medical History:   Diagnosis Date    Arthralgia of right acromioclavicular joint     Lyme disease     Postmenopausal bleeding     Situational anxiety     Small bowel obstruction (HCC)     Strain of deltoid muscle, left, initial encounter        Past Surgical History:   Procedure Laterality Date    COLONOSCOPY  2013    DILATION AND CURETTAGE OF UTERUS      OVARIAN CYST REMOVAL  1995    VA ARTHRP ACETBLR/PROX FEM PROSTC AGRFT/ALGRFT Left 4/1/2024    Procedure: ARTHROPLASTY HIP TOTAL ANTERIOR,NAVIGATED - same day;  Surgeon: Blaze Gallegos DO;  Location: [facility];  Service: Orthopedics    VA LAPS ABD PRTM&OMENTUM DX W/WO SPEC BR/WA SPX N/A 2/21/2021    Procedure: LAPAROSCOPY DIAGNOSTIC, exploratory laparotomy, lysis of adhesions, REDUCTION OF INTERNAL HERNIA;  Surgeon: Cris Luevano MD;  Location: [facility];  Service: General       Family History   Problem Relation Age of Onset    Atrial fibrillation Mother     Hyperlipidemia Mother     Cancer Mother     Heart disease Father     Hypertension Father     Hyperlipidemia Father     Diabetes Father     No Known Problems Sister     No Known Problems Maternal Grandmother     Prostate cancer Maternal Grandfather 50        Richard Marie    No Known Problems Paternal Grandmother     Diabetes Brother     No Known Problems Maternal Aunt     No Known Problems Maternal Aunt     No Known Problems Maternal Aunt     Prostate cancer Maternal Uncle 59    Breast cancer Paternal Aunt 35    No Known Problems Paternal Aunt        Social History     Occupational History    Not on file    Tobacco Use    Smoking status: Never    Smokeless tobacco: Never   Vaping Use    Vaping status: Never Used   Substance and Sexual Activity    Alcohol use: Yes     Alcohol/week: 2.0 standard drinks of alcohol     Types: 2 Cans of beer per week     Comment: occasional    Drug use: Never    Sexual activity: Yes     Partners: Male     Birth control/protection: Post-menopausal         Current Outpatient Medications:     celecoxib (CeleBREX) 200 mg capsule, TAKE 1 CAPSULE TWICE A DAY, Disp: 180 capsule, Rfl: 0    Iron-Vitamin C 100-250 MG TABS, Take by mouth 3 (three) times a week, Disp: , Rfl:     Multiple Vitamin (MULTI-VITAMIN DAILY) TABS, Take 1 tablet by mouth daily, Disp: , Rfl:     naloxone (NARCAN) 4 mg/0.1 mL nasal spray, Administer 1 spray into a nostril. If no response after 2-3 minutes, give another dose in the other nostril using a new spray., Disp: 1 each, Rfl: 1    pramipexole (MIRAPEX) 0.25 mg tablet, TAKE 1 TABLET DAILY AFTER DINNER, Disp: 90 tablet, Rfl: 3    Sodium Fluoride 5000 PPM 1.1 % PSTE, , Disp: , Rfl:     tretinoin (RETIN-A) 0.025 % cream, Apply thin film topically once daily, Disp: 45 g, Rfl: 3    acetaminophen (TYLENOL) 325 mg tablet, Take 3 tablets (975 mg total) by mouth every 8 (eight) hours (Patient not taking: Reported on 4/17/2024), Disp: , Rfl:     aspirin 325 mg tablet, Take 1 tablet (325 mg total) by mouth 2 (two) times a day, Disp: 84 tablet, Rfl: 0    Calcium Carb-Cholecalciferol 1000-800 MG-UNIT TABS, Take by mouth daily   (Patient not taking: Reported on 4/17/2024), Disp: , Rfl:     docusate sodium (COLACE) 100 mg capsule, Take 1 capsule (100 mg total) by mouth 2 (two) times a day (Patient not taking: Reported on 4/17/2024), Disp: 60 capsule, Rfl: 0    gabapentin (NEURONTIN) 300 mg capsule, Take 1 capsule at bedtime (Patient not taking: Reported on 4/17/2024), Disp: 90 capsule, Rfl: 3    Insulin Pen Needle 34G X 3.5 MM MISC, Use with Zepbound injections once a week (Patient not  taking: Reported on 4/17/2024), Disp: 4 each, Rfl: 11    LORazepam (ATIVAN) 0.5 mg tablet, Take 1 tablet (0.5 mg total) by mouth as needed for anxiety (Launch) Take as need for dental work (Patient not taking: Reported on 4/17/2024), Disp: 30 tablet, Rfl: 0    NON FORMULARY, Take 1 each by mouth daily NUTRIM OAT POWDER-SPRINKLE OVER FOOD ONCE A DAY (Patient not taking: Reported on 4/17/2024), Disp: , Rfl:     oxyCODONE (Roxicodone) 5 immediate release tablet, Take 1-2 tablets by mouth every 6 hours as needed for pain (Patient not taking: Reported on 4/17/2024), Disp: 40 tablet, Rfl: 0    valACYclovir (VALTREX) 1,000 mg tablet, Take 2 tablets twice a day as needed for cold sore x 1 day., Disp: 4 tablet, Rfl: 2    Allergies   Allergen Reactions    Codeine Dizziness       Objective:  Vitals:    05/16/24 0934   BP: 118/80   Pulse: 82       Body mass index is 30.05 kg/m².    Left Hip Exam     Tenderness   The patient is experiencing no tenderness.     Range of Motion   Abduction:  45   Adduction:  30   Extension:  0   Flexion:  130   Left hip external rotation: 45.   Internal rotation: 30     Muscle Strength   Abduction: 5/5   Adduction: 5/5   Flexion: 5/5     Tests   ESA: negative  [place]om[place]: negative    Other   Erythema: absent  Scars: present  Sensation: normal  Pulse: present    Comments:  Negative [facility]  Thigh and calf soft and nontender  SILT L2-S1  Anterior surgical scar well-healed, no signs of infection              Physical Exam  Vitals and nursing note reviewed.   Constitutional:       Appearance: Normal appearance.   HENT:      Head: Normocephalic and atraumatic.      Right Ear: External ear normal.      Left Ear: External ear normal.      Nose: Nose normal.   Eyes:      General: No scleral icterus.     Extraocular Movements: Extraocular movements intact.      Conjunctiva/sclera: Conjunctivae normal.   Cardiovascular:      Rate and Rhythm: Normal rate.   Pulmonary:      Effort: Pulmonary effort is normal.  No respiratory distress.   Musculoskeletal:      Cervical back: Normal range of motion and neck supple.      Comments: See ortho exam   Skin:     General: Skin is warm and dry.   Neurological:      Mental Status: She is alert and oriented to person, place, and time.   Psychiatric:         Mood and Affect: Mood normal.         Behavior: Behavior normal.         This document was created using speech voice recognition software.   Grammatical errors, random word insertions, pronoun errors, and incomplete sentences are an occasional consequence of this system due to software limitations, ambient noise, and hardware issues.   Any formal questions or concerns about content, text, or information contained within the body of this dictation should be directly addressed to the provider for clarification.

## 2024-05-22 ENCOUNTER — OFFICE VISIT (OUTPATIENT)
Dept: PHYSICAL THERAPY | Facility: CLINIC | Age: 59
End: 2024-05-22
Payer: COMMERCIAL

## 2024-05-22 DIAGNOSIS — M25.552 LEFT HIP PAIN: ICD-10-CM

## 2024-05-22 DIAGNOSIS — M16.12 PRIMARY OSTEOARTHRITIS OF LEFT HIP: Primary | ICD-10-CM

## 2024-05-22 PROCEDURE — 97110 THERAPEUTIC EXERCISES: CPT

## 2024-05-22 NOTE — PROGRESS NOTES
Progress Note     Today's date: 2024  Patient name: Jen Clark  : 1965  MRN: [id number]  Referring provider: Blaze Gallegos DO  Dx:   Encounter Diagnosis     ICD-10-CM    1. Primary osteoarthritis of left hip  M16.12       2. Left hip pain  M25.552           Start Time: 1145  Stop Time: 1230  Total time in clinic (min): 45 minutes    Subjective: Patient notes improvements in her L hip ROM and overall function. She has yet to return to work, as her return to work date is 2024. She continues to struggle with donning her sneakers due to increased L hip stiffness.       Objective: See treatment diary below    Warmup NuStep 10' prior to session   Circuit 1, 3 rounds, 30 seconds on, 30 seconds off  Split lunge chops   Stand to half kneel   Walking with 10# TT OH   Lateral heel taps   Circuit 2, 3 rounds, 30 seconds on, 30 seconds off  Split lunge on step   Standing on foam med ball rainbows   Lateral bosu walkovers   KB squats   Circuit 3, 3 rounds, 30 seconds on, 30 seconds off  Squat with med ball toss to wall   KB around the world in static squat hold   X band walk   Suitcase carries    Short Term: FROM DOS scheduled 24: MET as of 24  Pt will report decreased levels of pain by at least 2 subjective ratings in 4 weeks  Pt will demonstrate improved ROM by at least 10 degrees in 4 weeks  Pt will demonstrate improved strength by 1/2 grade in 4 weeks.  Pt will be able to ambulate without AD in 4 weeks  Long Term: PROGRESSING TOWARDS AS OF 24   Pt will be independent in their HEP in 8 weeks  Pt will be pain free with IADL's in 8 weeks.  Pt will demonstrate 5/5 MMT in 8 weeks.  Pt will be able to ambulate> 20 minutes without limitation in 8 weeks.  PT will be able to perform reciprocal stair navigation without issue in 8 weeks.     Objective     GAIT: slight L circumduction during swing phase  Squat assess: 75% depth right lateral lean  TU sec. With RW  TU sec with no AD           "     MMT          AROM           PROM     Hip         R          L         R        L          R         L   Flex. 5 4  110 110 105 88   Extn. 4 4           Abd. 4 4 35 35 35 30   Add.               IR. 4 4  20 20 18 15   ER. 4 4  25 25 25 15                  MMT     Knee      R          L   Flex. 5 5   Extn. 5 5                        MMT     Ankle         R          L   PF 5 4   DF. 5 5        Hip:  no special testing performed      Assessment: Tolerated treatment well. Patient is progressing well at this time, with improved gait mechanics and strength of her L LE. She continues to struggle with functional activities, such as donning pants and sneakers. She has yet to return to work due to strength and endurance deficits. At this time, patient is attempting a circuit based physical therapy program to build endurance and higher level strength. Patient would benefit from continued PT to return to work related activities and independence within the community.      Plan: Continue per plan of care.  1x/week for 4-6 weeks     Insurance:  A/CMS Eval/ Re-eval POC expires FOTO Auth #/ Referral # Total    Start date  Expiration date Extension  Visit limitation?  PT only or  PT+OT? Co-Insurance   CMS 3.26.24 6.11.24  needed                          AUTH #:  Date 3.26 4.4 4.9 4.11 4.16 4.18 4.22 4/25 5/1 5/8 5/15 5/22   Authed: Used 1 2 3 4 5 6 7 8 9 10 11 12    Remaining  11 10 9 8 7 6 5 4 3 2 1 0       Precautions: decreased hearing, hx of Lyme disease  Patient provided verbal consent to treatment plan and recommended interventions.    DOS: Left ERICK 4/1/24    Manuals 3.26 4.4 4.9 4.11 4.16 4.18 4.22 4.25     visit 1 2 3 4 5 6 7 8                                            Neuro Re-Ed             Bridge    2*10 3x10  HEP      Clam shell    2*10 3x10        Step downs     6\" 3x8  HEP      Exaggerated walking     15 feet, 8 laps 15 feet, 8 laps 15 feet, 8 laps      Lateral step ups      2*10 BOSU 2*10 BOSU      Ant step up   " "    2*10 BOSU LLE      Eccentric step downs        3x10                  Ther Ex             Nu step step st 8  6' lvl 1 6' lvl 1 6' lvl 2 Level 3, 6 min 6' lvl 3 NP      Stand hip abd  2*10 ea. 2*10 ea. 3*10 ea. 3x10 each 2*15 ea. HEP      Stand hip extn   2*10 ea. 3*10 ea. 3x10 each 2*15 ea. HEP      Step up  2*10, 4\" LLE 2*10, 6\" LLE 3*10, 6\" LLE 6\" 3x10 D/C       squats   3*10 3*10 3x10 3*10 3*10      Side stepping   4 laps 12' 4 laps, 12' 15 feet, 8 laps 15 feet, 6 laps 15 feet, 8 laps RTB  15\"  8 laps     Supine march   3*10 3*10 3x10  D/C      SLR flexion   2*8 PT assist 3*10 PT assist AROM 3x8  HEP      Stand hip flexor stretch     10\" x5 5*10'' 2*10, 5\" 2x10 5\"     Stand hip flexion      2*10 ea. 3*10 LLE      Lateral step up      3*10, 6\" 3*10, 6\" 3X10 6\"     CC reverse walk       2*10, 22.5# 3x10 22.5     Hip PROM       FB-flexion MR- flex     Leg press        65# 2x10                  Ther Activity             Pt ed FB FB      MR                  Gait Training             Stairs/car FB FB                        Modalities                                                    "

## 2024-05-29 ENCOUNTER — OFFICE VISIT (OUTPATIENT)
Dept: PHYSICAL THERAPY | Facility: CLINIC | Age: 59
End: 2024-05-29
Payer: COMMERCIAL

## 2024-05-29 DIAGNOSIS — M25.552 LEFT HIP PAIN: ICD-10-CM

## 2024-05-29 DIAGNOSIS — M16.12 PRIMARY OSTEOARTHRITIS OF LEFT HIP: Primary | ICD-10-CM

## 2024-05-29 PROCEDURE — 97110 THERAPEUTIC EXERCISES: CPT

## 2024-05-29 NOTE — PROGRESS NOTES
Progress Note     Today's date: 2024  Patient name: Jen Clark  : 1965  MRN: [id number]  Referring provider: Blaze Gallegos DO  Dx:   Encounter Diagnosis     ICD-10-CM    1. Primary osteoarthritis of left hip  M16.12       2. Left hip pain  M25.552                        Subjective: Patient notes continued improvement in functional mobility with the total joint class.      Objective: See treatment diary below    Warmup NuStep 10' prior to session   Circuit 1, 3 rounds, 30 seconds on, 30 seconds off  KB swings   Step up with alternating hip drive and OH press   Tandem walking on foam with alternating cone taps   Sled push/pull   Sit to stands on 18” box with KB   Circuit 2, 3 rounds, 30 seconds on, 30 seconds off  Walking lunges with TT twist   Squats on bosu   Sliders lateral and backwards   SLB with ball toss to wall   KB passthroughs in lunge position       Assessment: Tolerated treatment well. Patient continues to be challenged by SL balance activities especially with OH activities on foam. Patient would benefit from continued circuit based training to regain functional mobility and strength in order to return to PLOF. Patient would benefit from continued PT to return to work related activities and independence within the community.      Plan: Continue per plan of care.  1x/week for 4-6 weeks       Insurance:  AMA/CMS Eval/ Re-eval POC expires FOTO Auth #/ Referral # Total    Start date  Expiration date Extension  Visit limitation?  PT only or  PT+OT? Co-Insurance   CMS 3.26.24 6.11.24  needed                          AUTH #:  Date 3.26 4.4 4.9 4.11 4.16 4.18 4.22 4/25 5/1 5/8 5/15 5/22   Authed: Used 1 2 3 4 5 6 7 8 9 10 11 12    Remaining  11 10 9 8 7 6 5 4 3 2 1 0       Precautions: decreased hearing, hx of Lyme disease  Patient provided verbal consent to treatment plan and recommended interventions.    DOS: Left ERICK 24    Manuals 3.26 4.4 4.9 4.11 4.16 4.18 4.22 4.25     visit 1 2 3 4 5 6  "7 8                                            Neuro Re-Ed             Bridge    2*10 3x10  HEP      Clam shell    2*10 3x10        Step downs     6\" 3x8  HEP      Exaggerated walking     15 feet, 8 laps 15 feet, 8 laps 15 feet, 8 laps      Lateral step ups      2*10 BOSU 2*10 BOSU      Ant step up       2*10 BOSU LLE      Eccentric step downs        3x10                  Ther Ex             Nu step step st 8  6' lvl 1 6' lvl 1 6' lvl 2 Level 3, 6 min 6' lvl 3 NP      Stand hip abd  2*10 ea. 2*10 ea. 3*10 ea. 3x10 each 2*15 ea. HEP      Stand hip extn   2*10 ea. 3*10 ea. 3x10 each 2*15 ea. HEP      Step up  2*10, 4\" LLE 2*10, 6\" LLE 3*10, 6\" LLE 6\" 3x10 D/C       squats   3*10 3*10 3x10 3*10 3*10      Side stepping   4 laps 12' 4 laps, 12' 15 feet, 8 laps 15 feet, 6 laps 15 feet, 8 laps RTB  15\"  8 laps     Supine march   3*10 3*10 3x10  D/C      SLR flexion   2*8 PT assist 3*10 PT assist AROM 3x8  HEP      Stand hip flexor stretch     10\" x5 5*10'' 2*10, 5\" 2x10 5\"     Stand hip flexion      2*10 ea. 3*10 LLE      Lateral step up      3*10, 6\" 3*10, 6\" 3X10 6\"     CC reverse walk       2*10, 22.5# 3x10 22.5     Hip PROM       FB-flexion MR- flex     Leg press        65# 2x10                  Ther Activity             Pt ed FB FB      MR                  Gait Training             Stairs/car FB FB                        Modalities                                                    "

## 2024-06-05 ENCOUNTER — OFFICE VISIT (OUTPATIENT)
Dept: PHYSICAL THERAPY | Facility: CLINIC | Age: 59
End: 2024-06-05
Payer: COMMERCIAL

## 2024-06-05 DIAGNOSIS — M25.552 LEFT HIP PAIN: ICD-10-CM

## 2024-06-05 DIAGNOSIS — M16.12 PRIMARY OSTEOARTHRITIS OF LEFT HIP: Primary | ICD-10-CM

## 2024-06-05 PROCEDURE — 97110 THERAPEUTIC EXERCISES: CPT

## 2024-06-05 NOTE — PROGRESS NOTES
Daily Note     Today's date: 2024  Patient name: Jen Clark  : 1965  MRN: [id number]  Referring provider: Blaze Gallegos DO  Dx:   Encounter Diagnosis     ICD-10-CM    1. Primary osteoarthritis of left hip  M16.12       2. Left hip pain  M25.552               Start Time: 1100  Stop Time: 1142  Total time in clinic (min): 42 minutes    Subjective: Patient notes some medial knee discomfort after last session. Points to area just below TF joint line. Resolved after exercise.       Objective: See treatment diary below    Warmup NuStep 10' prior to session   Circuit 1, 3 rounds, 30 seconds on, 30 seconds off  Alternating marches with loop around toes   Backwards lunges off step with or without KB   SL RDL KB  Prieto rope double arm slam   Lateral lunges with KB   Step ups with alternating hip drive and OH press with TT  Lateral bosu walkovers   Circuit 2, 3 rounds, 30 seconds on, 30 seconds off  SLB on foam   Goblet squats   Squat with OH press   HR  Lateral rell walking with foam   Wall sit with TT twist   Curtsey squats      Assessment: Tolerated treatment well. Good tolerance to strength progressions. Less knee discomfort noted today, able to modify activity to account for pain. Patient would benefit from continued PT to return to work related activities and independence within the community.      Plan: Continue per plan of care.  1x/week for 4-6 weeks    Exercise grid not completed, see objective section for exercises today.       Insurance:  AMA/CMS Eval/ Re-eval POC expires FOTO Auth #/ Referral # Total    Start date  Expiration date Extension  Visit limitation?  PT only or  PT+OT? Co-Insurance   CMS 3.26.24 6  needed                          AUTH #:  Date 3.26 4.4 4.9 4.11 4.16 4.18 4.22  5/8 5/15 5/22   Authed: Used 1 2 3 4 5 6 7 8 9 10 11 12    Remaining  11 10 9 8 7 6 5 4 3 2 1 0       Precautions: decreased hearing, hx of Lyme disease  Patient provided verbal consent to treatment  "plan and recommended interventions.    DOS: Left ERICK 4/1/24    Manuals 3.26 4.4 4.9 4.11 4.16 4.18 4.22 4.25     visit 1 2 3 4 5 6 7 8                                            Neuro Re-Ed             Bridge    2*10 3x10  HEP      Clam shell    2*10 3x10        Step downs     6\" 3x8  HEP      Exaggerated walking     15 feet, 8 laps 15 feet, 8 laps 15 feet, 8 laps      Lateral step ups      2*10 BOSU 2*10 BOSU      Ant step up       2*10 BOSU LLE      Eccentric step downs        3x10                  Ther Ex             Nu step step st 8  6' lvl 1 6' lvl 1 6' lvl 2 Level 3, 6 min 6' lvl 3 NP      Stand hip abd  2*10 ea. 2*10 ea. 3*10 ea. 3x10 each 2*15 ea. HEP      Stand hip extn   2*10 ea. 3*10 ea. 3x10 each 2*15 ea. HEP      Step up  2*10, 4\" LLE 2*10, 6\" LLE 3*10, 6\" LLE 6\" 3x10 D/C       squats   3*10 3*10 3x10 3*10 3*10      Side stepping   4 laps 12' 4 laps, 12' 15 feet, 8 laps 15 feet, 6 laps 15 feet, 8 laps RTB  15\"  8 laps     Supine march   3*10 3*10 3x10  D/C      SLR flexion   2*8 PT assist 3*10 PT assist AROM 3x8  HEP      Stand hip flexor stretch     10\" x5 5*10'' 2*10, 5\" 2x10 5\"     Stand hip flexion      2*10 ea. 3*10 LLE      Lateral step up      3*10, 6\" 3*10, 6\" 3X10 6\"     CC reverse walk       2*10, 22.5# 3x10 22.5     Hip PROM       FB-flexion MR- flex     Leg press        65# 2x10                  Ther Activity             Pt ed FB FB      MR                  Gait Training             Stairs/car FB FB                        Modalities                                                    "

## 2024-06-12 ENCOUNTER — OFFICE VISIT (OUTPATIENT)
Dept: PHYSICAL THERAPY | Facility: CLINIC | Age: 59
End: 2024-06-12
Payer: COMMERCIAL

## 2024-06-12 DIAGNOSIS — M25.552 LEFT HIP PAIN: ICD-10-CM

## 2024-06-12 DIAGNOSIS — M16.12 PRIMARY OSTEOARTHRITIS OF LEFT HIP: Primary | ICD-10-CM

## 2024-06-12 PROCEDURE — 97112 NEUROMUSCULAR REEDUCATION: CPT

## 2024-06-12 PROCEDURE — 97110 THERAPEUTIC EXERCISES: CPT

## 2024-06-12 NOTE — PROGRESS NOTES
Daily Note     Today's date: 2024  Patient name: Jen Clark  : 1965  MRN: [id number]  Referring provider: Blaze Gallegos DO  Dx:   Encounter Diagnosis     ICD-10-CM    1. Primary osteoarthritis of left hip  M16.12       2. Left hip pain  M25.552                 Start Time: 1100  Stop Time: 1145  Total time in clinic (min): 45 minutes    Subjective: Patient reports that she went on a long walk with her dog and had muscle soreness 24-48 hours following activity in her quad but denied any hip pain. Pt states that she was doing an online workout 2 weeks ago with socks on and has had lateral left ankle pain since but patient states pain has improved since initial onset and does not stop her from performing ADLs.       Objective: See treatment diary below    Warmup NuStep 10' prior to session   Circuit 1, 3 rounds, 30 seconds on, 30 seconds off  Step ups on bosu   KB swings 8kg  Split stance chops  4kg  Med ball slams   Squat pulses   Circuit 2, 2 rounds, 30 seconds on, 30 seconds off  X band walk   Split squat around the world 8kg  Wall sit with alt leg ext   Sit to stands from 15” box   Walking lunges with tidal tank twist  Circuit 3, 2 rounds 30 sec on, 30 sec off  Foam balance beam, 4 cone touch  RDL to cone KB  Resisted squat row  Curtsy squat with KB 8kg  Adduction slider       Assessment: Tolerated treatment well.  Pt was able to perform all exercises today with no complaint of hip pain and demonstrates proper muscle fatigue. Patient would benefit from continued PT to return to work related activities and independence within the community.      Plan: Continue per plan of care.  1x/week for 4-6 weeks    Exercise grid not completed, see objective section for exercises today.       Insurance:  AMA/CMS Eval/ Re-eval POC expires FOTO Auth #/ Referral # Total    Start date  Expiration date Extension  Visit limitation?  PT only or  PT+OT? Co-Insurance   CMS 3.26.24 6.11.24  needed                       "    AUTH #:  Date 3.26 4.4 4.9 4.11 4.16 4.18 4.22 4/25 5/1 5/8 5/15 5/22   Authed: Used 1 2 3 4 5 6 7 8 9 10 11 12    Remaining  11 10 9 8 7 6 5 4 3 2 1 0       Precautions: decreased hearing, hx of Lyme disease  Patient provided verbal consent to treatment plan and recommended interventions.    DOS: Left ERICK 4/1/24    Manuals 3.26 4.4 4.9 4.11 4.16 4.18 4.22 4.25     visit 1 2 3 4 5 6 7 8                                            Neuro Re-Ed             Bridge    2*10 3x10  HEP      Clam shell    2*10 3x10        Step downs     6\" 3x8  HEP      Exaggerated walking     15 feet, 8 laps 15 feet, 8 laps 15 feet, 8 laps      Lateral step ups      2*10 BOSU 2*10 BOSU      Ant step up       2*10 BOSU LLE      Eccentric step downs        3x10                  Ther Ex             Nu step step st 8  6' lvl 1 6' lvl 1 6' lvl 2 Level 3, 6 min 6' lvl 3 NP      Stand hip abd  2*10 ea. 2*10 ea. 3*10 ea. 3x10 each 2*15 ea. HEP      Stand hip extn   2*10 ea. 3*10 ea. 3x10 each 2*15 ea. HEP      Step up  2*10, 4\" LLE 2*10, 6\" LLE 3*10, 6\" LLE 6\" 3x10 D/C       squats   3*10 3*10 3x10 3*10 3*10      Side stepping   4 laps 12' 4 laps, 12' 15 feet, 8 laps 15 feet, 6 laps 15 feet, 8 laps RTB  15\"  8 laps     Supine march   3*10 3*10 3x10  D/C      SLR flexion   2*8 PT assist 3*10 PT assist AROM 3x8  HEP      Stand hip flexor stretch     10\" x5 5*10'' 2*10, 5\" 2x10 5\"     Stand hip flexion      2*10 ea. 3*10 LLE      Lateral step up      3*10, 6\" 3*10, 6\" 3X10 6\"     CC reverse walk       2*10, 22.5# 3x10 22.5     Hip PROM       FB-flexion MR- flex     Leg press        65# 2x10                  Ther Activity             Pt ed FB FB      MR                  Gait Training             Stairs/car FB FB                        Modalities                                                    "

## 2024-06-19 ENCOUNTER — OFFICE VISIT (OUTPATIENT)
Dept: PHYSICAL THERAPY | Facility: CLINIC | Age: 59
End: 2024-06-19
Payer: COMMERCIAL

## 2024-06-19 DIAGNOSIS — M25.552 LEFT HIP PAIN: ICD-10-CM

## 2024-06-19 DIAGNOSIS — M16.12 PRIMARY OSTEOARTHRITIS OF LEFT HIP: Primary | ICD-10-CM

## 2024-06-19 PROCEDURE — 97110 THERAPEUTIC EXERCISES: CPT

## 2024-06-19 NOTE — PROGRESS NOTES
Daily Note     Today's date: 2024  Patient name: Jen Clark  : 1965  MRN: [id number]  Referring provider: Blaze Gallegos DO  Dx:   Encounter Diagnosis     ICD-10-CM    1. Primary osteoarthritis of left hip  M16.12       2. Left hip pain  M25.552                   Start Time: 1100  Stop Time: 1145  Total time in clinic (min): 45 minutes    Subjective: Patient has no new complaints at this time. She does have some numbness of her anterior L thigh, and some pain at her knee.       Objective: See treatment diary below    Warmup NuStep 10' prior to session   Circuit 1, 3 rounds, 30 seconds on, 30 seconds off  Anterior step downs  Walking lunges with KB  Squat with OH press   Prieto ropes double arm   Circuit 2, 3 rounds, 30 seconds on, 30 seconds off  Reverse lunges off 6” step   SLB on bosu with ball toss   Squat with row   Half kneel to alternating hip drive   Circuit 3, 3 rounds, 30 seconds on, 30 seconds off  Forward rell walking  Spikey ball walking  Alternating marches with loop       Assessment: Tolerated treatment well.  Patient is progressing exceptionally well at this time, with slight balance deficits noted. She continues to note difficulties with performing SL activities on uneven surfaces. Will continue to progress as tolerated.       Plan: Continue per plan of care.  1x/week for 4-6 weeks    Exercise grid not completed, see objective section for exercises today.       Insurance:  AMA/CMS Eval/ Re-eval POC expires FOTO Auth #/ Referral # Total    Start date  Expiration date Extension  Visit limitation?  PT only or  PT+OT? Co-Insurance   CMS 3.26.24 6.11.24  needed                          AUTH #:  Date 3.26 4.4 4.9 4.11 4.16 4.18 4.22  5/ 5/8 5/15 5/22   Authed: Used 1 2 3 4 5 6 7 8 9 10 11 12    Remaining  11 10 9 8 7 6 5 4 3 2 1 0       Precautions: decreased hearing, hx of Lyme disease  Patient provided verbal consent to treatment plan and recommended interventions.    DOS: Left ERICK  "4/1/24    Manuals 3.26 4.4 4.9 4.11 4.16 4.18 4.22 4.25     visit 1 2 3 4 5 6 7 8                                            Neuro Re-Ed             Bridge    2*10 3x10  HEP      Clam shell    2*10 3x10        Step downs     6\" 3x8  HEP      Exaggerated walking     15 feet, 8 laps 15 feet, 8 laps 15 feet, 8 laps      Lateral step ups      2*10 BOSU 2*10 BOSU      Ant step up       2*10 BOSU LLE      Eccentric step downs        3x10                  Ther Ex             Nu step step st 8  6' lvl 1 6' lvl 1 6' lvl 2 Level 3, 6 min 6' lvl 3 NP      Stand hip abd  2*10 ea. 2*10 ea. 3*10 ea. 3x10 each 2*15 ea. HEP      Stand hip extn   2*10 ea. 3*10 ea. 3x10 each 2*15 ea. HEP      Step up  2*10, 4\" LLE 2*10, 6\" LLE 3*10, 6\" LLE 6\" 3x10 D/C       squats   3*10 3*10 3x10 3*10 3*10      Side stepping   4 laps 12' 4 laps, 12' 15 feet, 8 laps 15 feet, 6 laps 15 feet, 8 laps RTB  15\"  8 laps     Supine march   3*10 3*10 3x10  D/C      SLR flexion   2*8 PT assist 3*10 PT assist AROM 3x8  HEP      Stand hip flexor stretch     10\" x5 5*10'' 2*10, 5\" 2x10 5\"     Stand hip flexion      2*10 ea. 3*10 LLE      Lateral step up      3*10, 6\" 3*10, 6\" 3X10 6\"     CC reverse walk       2*10, 22.5# 3x10 22.5     Hip PROM       FB-flexion MR- flex     Leg press        65# 2x10                  Ther Activity             Pt ed FB FB      MR                  Gait Training             Stairs/car FB FB                        Modalities                                                    "

## 2024-06-21 ENCOUNTER — TELEPHONE (OUTPATIENT)
Age: 59
End: 2024-06-21

## 2024-06-21 NOTE — TELEPHONE ENCOUNTER
Called and spoke with patient.     She states that she noticed the knee pain mostly during her PT sessions. She has an appt on Thursday with Dr. Gallegos and was asking if they can do x-rays of her knee at that time to have it evaluated.     As far as her restless leg syndrome, she has a call out to her PCP regarding this. She wanted to ask Dr. Gallegos if he had any advice or suggestions regarding this. I suggested she discuss with him at her appt on Thurs and to continue to follow up with her PCP.

## 2024-06-21 NOTE — TELEPHONE ENCOUNTER
Caller: patient    Doctor: Rosy    Reason for call: pt called stating she is have left knee pain. She is using ice, heat and taking Advil. She stated that it is helping with the pain but is not sleeping at night.  Pt does have restless limb syndrome and was taking Mirapex. She stopped taking this and she is thinking it was making it worse. She did put a message in for her PCP. She was wonder if Dr Gallegos has any recommendation for this. Sx 4/1/24 left ERICK    Call back#: [phone]

## 2024-06-23 DIAGNOSIS — E55.9 VITAMIN D DEFICIENCY: ICD-10-CM

## 2024-06-23 DIAGNOSIS — G25.81 RESTLESS LEG SYNDROME: ICD-10-CM

## 2024-06-23 DIAGNOSIS — E78.49 OTHER HYPERLIPIDEMIA: Primary | ICD-10-CM

## 2024-06-25 ENCOUNTER — APPOINTMENT (OUTPATIENT)
Dept: LAB | Facility: HOSPITAL | Age: 59
End: 2024-06-25
Payer: COMMERCIAL

## 2024-06-25 DIAGNOSIS — G25.81 RESTLESS LEG SYNDROME: ICD-10-CM

## 2024-06-25 DIAGNOSIS — Z00.6 ENCOUNTER FOR EXAMINATION FOR NORMAL COMPARISON OR CONTROL IN CLINICAL RESEARCH PROGRAM: ICD-10-CM

## 2024-06-25 DIAGNOSIS — E55.9 VITAMIN D DEFICIENCY: ICD-10-CM

## 2024-06-25 DIAGNOSIS — E78.49 OTHER HYPERLIPIDEMIA: ICD-10-CM

## 2024-06-25 LAB
25(OH)D3 SERPL-MCNC: 58 NG/ML (ref 30–100)
ALBUMIN SERPL BCG-MCNC: 4.2 G/DL (ref 3.5–5)
ALP SERPL-CCNC: 74 U/L (ref 34–104)
ALT SERPL W P-5'-P-CCNC: 18 U/L (ref 7–52)
ANION GAP SERPL CALCULATED.3IONS-SCNC: 16 MMOL/L (ref 4–13)
AST SERPL W P-5'-P-CCNC: 15 U/L (ref 13–39)
BASOPHILS # BLD AUTO: 0.03 THOUSANDS/ÂΜL (ref 0–0.1)
BASOPHILS NFR BLD AUTO: 1 % (ref 0–1)
BILIRUB SERPL-MCNC: 0.54 MG/DL (ref 0.2–1)
BUN SERPL-MCNC: 17 MG/DL (ref 5–25)
CALCIUM SERPL-MCNC: 9.4 MG/DL (ref 8.4–10.2)
CHLORIDE SERPL-SCNC: 98 MMOL/L (ref 96–108)
CHOLEST SERPL-MCNC: 233 MG/DL
CO2 SERPL-SCNC: 26 MMOL/L (ref 21–32)
CREAT SERPL-MCNC: 0.67 MG/DL (ref 0.6–1.3)
EOSINOPHIL # BLD AUTO: 0.13 THOUSAND/ÂΜL (ref 0–0.61)
EOSINOPHIL NFR BLD AUTO: 3 % (ref 0–6)
ERYTHROCYTE [DISTWIDTH] IN BLOOD BY AUTOMATED COUNT: 13.3 % (ref 11.6–15.1)
FERRITIN SERPL-MCNC: 58 NG/ML (ref 11–307)
GFR SERPL CREATININE-BSD FRML MDRD: 96 ML/MIN/1.73SQ M
GLUCOSE P FAST SERPL-MCNC: 88 MG/DL (ref 65–99)
HCT VFR BLD AUTO: 45.3 % (ref 34.8–46.1)
HDLC SERPL-MCNC: 50 MG/DL
HGB BLD-MCNC: 14.2 G/DL (ref 11.5–15.4)
IMM GRANULOCYTES # BLD AUTO: 0.02 THOUSAND/UL (ref 0–0.2)
IMM GRANULOCYTES NFR BLD AUTO: 0 % (ref 0–2)
IRON SATN MFR SERPL: 24 % (ref 15–50)
IRON SERPL-MCNC: 83 UG/DL (ref 50–212)
LDLC SERPL CALC-MCNC: 166 MG/DL (ref 0–100)
LYMPHOCYTES # BLD AUTO: 1.59 THOUSANDS/ÂΜL (ref 0.6–4.47)
LYMPHOCYTES NFR BLD AUTO: 31 % (ref 14–44)
MCH RBC QN AUTO: 29.2 PG (ref 26.8–34.3)
MCHC RBC AUTO-ENTMCNC: 31.3 G/DL (ref 31.4–37.4)
MCV RBC AUTO: 93 FL (ref 82–98)
MONOCYTES # BLD AUTO: 0.51 THOUSAND/ÂΜL (ref 0.17–1.22)
MONOCYTES NFR BLD AUTO: 10 % (ref 4–12)
NEUTROPHILS # BLD AUTO: 2.81 THOUSANDS/ÂΜL (ref 1.85–7.62)
NEUTS SEG NFR BLD AUTO: 55 % (ref 43–75)
NRBC BLD AUTO-RTO: 0 /100 WBCS
PLATELET # BLD AUTO: 309 THOUSANDS/UL (ref 149–390)
PMV BLD AUTO: 9.6 FL (ref 8.9–12.7)
POTASSIUM SERPL-SCNC: 4.4 MMOL/L (ref 3.5–5.3)
PROT SERPL-MCNC: 7.2 G/DL (ref 6.4–8.4)
RBC # BLD AUTO: 4.86 MILLION/UL (ref 3.81–5.12)
SODIUM SERPL-SCNC: 140 MMOL/L (ref 135–147)
TIBC SERPL-MCNC: 341 UG/DL (ref 250–450)
TRIGL SERPL-MCNC: 85 MG/DL
TSH SERPL DL<=0.05 MIU/L-ACNC: 1.99 UIU/ML (ref 0.45–4.5)
UIBC SERPL-MCNC: 258 UG/DL (ref 155–355)
WBC # BLD AUTO: 5.09 THOUSAND/UL (ref 4.31–10.16)

## 2024-06-25 PROCEDURE — 83540 ASSAY OF IRON: CPT

## 2024-06-25 PROCEDURE — 80061 LIPID PANEL: CPT

## 2024-06-25 PROCEDURE — 82728 ASSAY OF FERRITIN: CPT

## 2024-06-25 PROCEDURE — 85025 COMPLETE CBC W/AUTO DIFF WBC: CPT

## 2024-06-25 PROCEDURE — 82306 VITAMIN D 25 HYDROXY: CPT

## 2024-06-25 PROCEDURE — 36415 COLL VENOUS BLD VENIPUNCTURE: CPT

## 2024-06-25 PROCEDURE — 80053 COMPREHEN METABOLIC PANEL: CPT

## 2024-06-25 PROCEDURE — 83550 IRON BINDING TEST: CPT

## 2024-06-25 PROCEDURE — 84443 ASSAY THYROID STIM HORMONE: CPT

## 2024-06-26 ENCOUNTER — OFFICE VISIT (OUTPATIENT)
Dept: PHYSICAL THERAPY | Facility: CLINIC | Age: 59
End: 2024-06-26
Payer: COMMERCIAL

## 2024-06-26 DIAGNOSIS — M16.12 PRIMARY OSTEOARTHRITIS OF LEFT HIP: ICD-10-CM

## 2024-06-26 DIAGNOSIS — M25.552 LEFT HIP PAIN: Primary | ICD-10-CM

## 2024-06-26 PROCEDURE — 97110 THERAPEUTIC EXERCISES: CPT

## 2024-06-26 NOTE — PROGRESS NOTES
Discharge Note     Today's date: 2024  Patient name: Jen Clark  : 1965  MRN: [id number]  Referring provider: Blaze Gallegos DO  Dx:   Encounter Diagnosis     ICD-10-CM    1. Left hip pain  M25.552       2. Primary osteoarthritis of left hip  M16.12                     Start Time: 1100  Stop Time: 1145  Total time in clinic (min): 45 minutes    Subjective: Patient is to go back to work on Monday. She denies pain of her L hip. She wishes to be discharged at this time.       Objective: See treatment diary below    Warmup NuStep 10' prior to session   Circuit 1, 2 rounds, 30 seconds on, 30 seconds off  Bosu lateral walkovers   Sled push/pull   Sliders backwards and lateral   KB swings   Circuit 2, 3 rounds, 30 seconds on, 30 seconds off  Lateral heel taps   Wall sit   Side stepping on foam with cone taps   Suitcase carries with TT   Circuit 3, 3 rounds, 30 seconds on, 30 seconds off  Sit to stands from 15” step   SL TRX squat   SLB on foam   Hip burners   D2 chops with band in split stance       Assessment: Tolerated treatment well.  Patient demonstrates excellent functional mobility during today's session. She has met all her goals and wishes to be discharged at this time.       Plan: D/C as patient is going back to work.     Exercise grid not completed, see objective section for exercises today.       Insurance:  AMA/CMS Eval/ Re-eval POC expires FOTO Auth #/ Referral # Total    Start date  Expiration date Extension  Visit limitation?  PT only or  PT+OT? Co-Insurance   CMS 3.26.24 6.11.24  needed                          AUTH #:  Date 3.26 4.4 4.9 4.11 4.16 4.18 4.22  5/8 5/15 5/22   Authed: Used 1 2 3 4 5 6 7 8 9 10 11 12    Remaining  11 10 9 8 7 6 5 4 3 2 1 0       Precautions: decreased hearing, hx of Lyme disease  Patient provided verbal consent to treatment plan and recommended interventions.    DOS: Left ERICK 24    Manuals 3. 4.4 4.9 4.11 4.16 4.18 4.22 4.     visit 1 2 3 4 5 6 7  "8                                            Neuro Re-Ed             Bridge    2*10 3x10  HEP      Clam shell    2*10 3x10        Step downs     6\" 3x8  HEP      Exaggerated walking     15 feet, 8 laps 15 feet, 8 laps 15 feet, 8 laps      Lateral step ups      2*10 BOSU 2*10 BOSU      Ant step up       2*10 BOSU LLE      Eccentric step downs        3x10                  Ther Ex             Nu step step st 8  6' lvl 1 6' lvl 1 6' lvl 2 Level 3, 6 min 6' lvl 3 NP      Stand hip abd  2*10 ea. 2*10 ea. 3*10 ea. 3x10 each 2*15 ea. HEP      Stand hip extn   2*10 ea. 3*10 ea. 3x10 each 2*15 ea. HEP      Step up  2*10, 4\" LLE 2*10, 6\" LLE 3*10, 6\" LLE 6\" 3x10 D/C       squats   3*10 3*10 3x10 3*10 3*10      Side stepping   4 laps 12' 4 laps, 12' 15 feet, 8 laps 15 feet, 6 laps 15 feet, 8 laps RTB  15\"  8 laps     Supine march   3*10 3*10 3x10  D/C      SLR flexion   2*8 PT assist 3*10 PT assist AROM 3x8  HEP      Stand hip flexor stretch     10\" x5 5*10'' 2*10, 5\" 2x10 5\"     Stand hip flexion      2*10 ea. 3*10 LLE      Lateral step up      3*10, 6\" 3*10, 6\" 3X10 6\"     CC reverse walk       2*10, 22.5# 3x10 22.5     Hip PROM       FB-flexion MR- flex     Leg press        65# 2x10                  Ther Activity             Pt ed FB FB      MR                  Gait Training             Stairs/car FB FB                        Modalities                                                    "

## 2024-06-27 ENCOUNTER — OFFICE VISIT (OUTPATIENT)
Dept: OBGYN CLINIC | Facility: CLINIC | Age: 59
End: 2024-06-27

## 2024-06-27 ENCOUNTER — APPOINTMENT (OUTPATIENT)
Dept: RADIOLOGY | Facility: CLINIC | Age: 59
End: 2024-06-27
Payer: COMMERCIAL

## 2024-06-27 VITALS
HEART RATE: 98 BPM | BODY MASS INDEX: 28.83 KG/M2 | SYSTOLIC BLOOD PRESSURE: 96 MMHG | WEIGHT: 179.4 LBS | DIASTOLIC BLOOD PRESSURE: 65 MMHG | HEIGHT: 66 IN

## 2024-06-27 DIAGNOSIS — M25.562 ACUTE PAIN OF BOTH KNEES: ICD-10-CM

## 2024-06-27 DIAGNOSIS — M22.2X2 PATELLOFEMORAL DISORDER OF LEFT KNEE: Primary | ICD-10-CM

## 2024-06-27 DIAGNOSIS — Z96.642 STATUS POST TOTAL REPLACEMENT OF LEFT HIP: ICD-10-CM

## 2024-06-27 DIAGNOSIS — M25.562 PAIN IN BOTH KNEES, UNSPECIFIED CHRONICITY: ICD-10-CM

## 2024-06-27 DIAGNOSIS — M25.561 ACUTE PAIN OF BOTH KNEES: ICD-10-CM

## 2024-06-27 DIAGNOSIS — M25.561 PAIN IN BOTH KNEES, UNSPECIFIED CHRONICITY: ICD-10-CM

## 2024-06-27 PROCEDURE — 73562 X-RAY EXAM OF KNEE 3: CPT

## 2024-06-27 PROCEDURE — 99024 POSTOP FOLLOW-UP VISIT: CPT | Performed by: ORTHOPAEDIC SURGERY

## 2024-06-27 PROCEDURE — 73560 X-RAY EXAM OF KNEE 1 OR 2: CPT

## 2024-06-27 PROCEDURE — 73502 X-RAY EXAM HIP UNI 2-3 VIEWS: CPT

## 2024-06-27 NOTE — LETTER
June 27, 2024     Patient: Jen Clark  YOB: 1965  Date of Visit: 6/27/2024      To Whom it May Concern:    Jen Clark is under my professional care. Jen was seen in my office on 6/27/2024. Jen may return to work with no restrictions 7/8/24.     If you have any questions or concerns, please don't hesitate to call.         Sincerely,          Blaze Gallegos, DO        CC: No Recipients

## 2024-06-27 NOTE — PROGRESS NOTES
Assessment/Plan:  1. Patellofemoral disorder of left knee  Ambulatory Referral to Physical Therapy      2. Acute pain of both knees  XR hip/pelv 2-3 vws left if performed    XR knee 3 vw left non injury    XR knee 1 or 2 vw right    Ambulatory Referral to Physical Therapy      3. Status post total replacement of left hip  XR hip/pelv 2-3 vws left if performed    XR knee 3 vw left non injury    XR knee 1 or 2 vw right        Scribe Attestation      I,:  Doris Bower am acting as a scribe while in the presence of the attending physician.:       I,:  Blaze Gallegos, DO personally performed the services described in this documentation    as scribed in my presence.:           Jen is a pleasant 58 y.o. female who presents for initial evaluation of left knee pain. Upon review of the x-rays, a thorough history and my examination, Jen is presenting with signs and symptoms consistent with patellofemoral pain. Etiology of diagnosis and treatment options discussed with patient, all her questions were addressed. Referral to physical therapy provided to address patellofemoral tracking, and assist patient with home exercise program. If physical therapy does not help she will call and we will prescribe meloxicam. In regards to her knee pain follow up in 2 months if symptoms do not improve.     Patient is also 3 months S/P left total hip arthroplasty. I am very pleased with her clinical exam and review of imaging today.. She may continue activities as tolerated. The patient is counseled on prophylactic antibiotic use prior to dental visits. She will follow up in 9 months for the left hip with x-rays on arrival.      Subjective: Follow up     Patient ID: Jen Clark is a 58 y.o. female presents 3-month status post left total hip arthroplasty.  Patient is doing very well overall, she has transition from physical therapy to home exercise program.  She denies any pain.  She has resumed activities of daily living without issue or  complication.  Patient's primary concern today is intermittent aching left knee pain.  History of right knee lateral meniscus tear confirmed on MRI in 2016.  Subacute left knee pain without injury or trauma localized medially.  Pain is rated a 2 out of 10 today but can increase in severity to 6 out of 10 and she denies any instability, locking, catching, or giving out.      Review of Systems   Constitutional:  Positive for activity change. Negative for chills and fever.   HENT:  Negative for ear pain and sore throat.    Eyes:  Negative for pain and visual disturbance.   Respiratory:  Negative for cough and shortness of breath.    Cardiovascular:  Negative for chest pain and palpitations.   Gastrointestinal:  Negative for abdominal pain and vomiting.   Genitourinary:  Negative for dysuria and hematuria.   Musculoskeletal:  Positive for arthralgias. Negative for back pain.   Skin:  Negative for color change and rash.   Neurological:  Negative for seizures and syncope.   All other systems reviewed and are negative.        Past Medical History:   Diagnosis Date    Arthralgia of right acromioclavicular joint     Lyme disease     Postmenopausal bleeding     Situational anxiety     Small bowel obstruction (HCC)     Strain of deltoid muscle, left, initial encounter        Past Surgical History:   Procedure Laterality Date    COLONOSCOPY  2013    DILATION AND CURETTAGE OF UTERUS      OVARIAN CYST REMOVAL  1995    GA ARTHRP ACETBLR/PROX FEM PROSTC AGRFT/ALGRFT Left 4/1/2024    Procedure: ARTHROPLASTY HIP TOTAL ANTERIOR,NAVIGATED - same day;  Surgeon: Blaze Gallegos DO;  Location: [facility];  Service: Orthopedics    GA LAPS ABD PRTM&OMENTUM DX W/WO SPEC BR/WA SPX N/A 2/21/2021    Procedure: LAPAROSCOPY DIAGNOSTIC, exploratory laparotomy, lysis of adhesions, REDUCTION OF INTERNAL HERNIA;  Surgeon: Cris Luevano MD;  Location: [facility];  Service: General       Family History   Problem Relation Age of Onset    Atrial  fibrillation Mother     Hyperlipidemia Mother     Cancer Mother     Heart disease Father     Hypertension Father     Hyperlipidemia Father     Diabetes Father     No Known Problems Sister     No Known Problems Maternal Grandmother     Prostate cancer Maternal Grandfather 50        Richard Marie    No Known Problems Paternal Grandmother     Diabetes Brother     No Known Problems Maternal Aunt     No Known Problems Maternal Aunt     No Known Problems Maternal Aunt     Prostate cancer Maternal Uncle 59    Breast cancer Paternal Aunt 35    No Known Problems Paternal Aunt        Social History     Occupational History    Not on file   Tobacco Use    Smoking status: Never    Smokeless tobacco: Never   Vaping Use    Vaping status: Never Used   Substance and Sexual Activity    Alcohol use: Yes     Alcohol/week: 2.0 standard drinks of alcohol     Types: 2 Cans of beer per week     Comment: occasional    Drug use: Never    Sexual activity: Yes     Partners: Male     Birth control/protection: Post-menopausal         Current Outpatient Medications:     celecoxib (CeleBREX) 200 mg capsule, TAKE 1 CAPSULE TWICE A DAY, Disp: 180 capsule, Rfl: 0    Iron-Vitamin C 100-250 MG TABS, Take by mouth 3 (three) times a week, Disp: , Rfl:     Multiple Vitamin (MULTI-VITAMIN DAILY) TABS, Take 1 tablet by mouth daily, Disp: , Rfl:     naloxone (NARCAN) 4 mg/0.1 mL nasal spray, Administer 1 spray into a nostril. If no response after 2-3 minutes, give another dose in the other nostril using a new spray., Disp: 1 each, Rfl: 1    pramipexole (MIRAPEX) 0.25 mg tablet, TAKE 1 TABLET DAILY AFTER DINNER, Disp: 90 tablet, Rfl: 3    Sodium Fluoride 5000 PPM 1.1 % PSTE, , Disp: , Rfl:     tretinoin (RETIN-A) 0.025 % cream, Apply thin film topically once daily, Disp: 45 g, Rfl: 3    gabapentin (NEURONTIN) 300 mg capsule, Take 1 capsule at bedtime (Patient not taking: Reported on 4/17/2024), Disp: 90 capsule, Rfl: 3    Insulin Pen Needle 34G X 3.5 MM  MISC, Use with Zepbound injections once a week (Patient not taking: Reported on 4/17/2024), Disp: 4 each, Rfl: 11    LORazepam (ATIVAN) 0.5 mg tablet, Take 1 tablet (0.5 mg total) by mouth as needed for anxiety (Launch) Take as need for dental work (Patient not taking: Reported on 4/17/2024), Disp: 30 tablet, Rfl: 0    valACYclovir (VALTREX) 1,000 mg tablet, Take 2 tablets twice a day as needed for cold sore x 1 day., Disp: 4 tablet, Rfl: 2    Allergies   Allergen Reactions    Codeine Dizziness       Objective:  Vitals:    06/27/24 0857   BP: 96/65   Pulse: 98       Body mass index is 28.96 kg/m².    Left Knee Exam     Tenderness   The patient is experiencing tenderness in the medial joint line and patella.    Range of Motion   Extension:  0   Flexion:  120     Tests   Varus: negative Valgus: negative  Lachman:  Anterior - negative        Other   Erythema: absent  Sensation: normal  Pulse: present  Swelling: none  Effusion: no effusion present    Comments:  No significant effusion  Knee is stable at 0, 30, and 90 degrees  Skin is warm and dry to touch with no signs of erythema, ecchymosis, or infection   Flexor and extensor mechanisms are intact   Patella tracks centrally with palpable crepitus  Equivocal grind   Calf compartments are soft and supple  2+ DP and PT pulses with brisk capillary refill to the toes  Sural, saphenous, tibial, superficial, and deep peroneal motor and sensory distributions intact  Sensation light touch intact distally        Left Hip Exam     Tenderness   The patient is experiencing no tenderness.     Range of Motion   Abduction:  45   Adduction:  30   Flexion:  120   External rotation:  50   Internal rotation: 30     Muscle Strength   The patient has normal left hip strength.     Other   Erythema: absent  Sensation: normal  Pulse: present    Comments:  Well healed anterior hip scar  2+ TP and DP pulses with brisk capillary refill to the toes  Sural, saphenous, tibial, superficial and deep  peroneal motor and sensory distribution intact  Sensation to light touch               Observations   Left Knee   Negative for effusion.       Physical Exam  Vitals and nursing note reviewed.   HENT:      Head: Normocephalic.   Eyes:      Extraocular Movements: Extraocular movements intact.   Cardiovascular:      Rate and Rhythm: Normal rate.      Pulses: Normal pulses.   Pulmonary:      Effort: Pulmonary effort is normal.   Musculoskeletal:         General: Normal range of motion.      Cervical back: Normal range of motion.      Left knee: No effusion.      Comments: See ortho exam   Skin:     General: Skin is warm and dry.   Neurological:      General: No focal deficit present.      Mental Status: She is alert.   Psychiatric:         Behavior: Behavior normal.         I have personally reviewed pertinent films in PACS.      X-ray of the left hip obtained on 06/27/24 reviewed demonstrating a well-positioned and aligned total hip arthroplasty without evidence of failure.  There is no new fracture, dislocation, lytic or blastic lesion.    X-ray of the bilateral knees demonstrate degenerative changes with joint space loss and subchondral sclerosis.     This document was created using speech voice recognition software.   Grammatical errors, random word insertions, pronoun errors, and incomplete sentences are an occasional consequence of this system due to software limitations, ambient noise, and hardware issues.   Any formal questions or concerns about content, text, or information contained within the body of this dictation should be directly addressed to the provider for clarification.

## 2024-06-28 ENCOUNTER — TELEPHONE (OUTPATIENT)
Dept: OBGYN CLINIC | Facility: HOSPITAL | Age: 59
End: 2024-06-28

## 2024-06-28 DIAGNOSIS — Z96.642 STATUS POST TOTAL REPLACEMENT OF LEFT HIP: Primary | ICD-10-CM

## 2024-06-28 RX ORDER — AMOXICILLIN 500 MG/1
2000 TABLET, FILM COATED ORAL
Qty: 4 TABLET | Refills: 2 | Status: SHIPPED | OUTPATIENT
Start: 2024-06-28 | End: 2024-09-26

## 2024-06-28 NOTE — TELEPHONE ENCOUNTER
"4/1 ERICK      Pt left VM on my line stating she has been having tooth sensitivity, and is going to the dentist Monday for them \"to just look at it\" and \"maybe take an XR\" but would not be doing work yet.       Routing to surgeon for guidance and any antibiotics and will call patient then with instruction.   "

## 2024-07-12 DIAGNOSIS — E66.09 CLASS 1 OBESITY DUE TO EXCESS CALORIES WITHOUT SERIOUS COMORBIDITY WITH BODY MASS INDEX (BMI) OF 30.0 TO 30.9 IN ADULT: Primary | ICD-10-CM

## 2024-07-13 DIAGNOSIS — M54.16 LUMBAR RADICULOPATHY: ICD-10-CM

## 2024-07-13 RX ORDER — GABAPENTIN 300 MG/1
CAPSULE ORAL
Qty: 90 CAPSULE | Refills: 1 | Status: SHIPPED | OUTPATIENT
Start: 2024-07-13

## 2024-07-19 ENCOUNTER — HOSPITAL ENCOUNTER (OUTPATIENT)
Dept: RADIOLOGY | Facility: HOSPITAL | Age: 59
Discharge: HOME | End: 2024-07-19
Payer: COMMERCIAL

## 2024-07-19 VITALS — BODY MASS INDEX: 28.93 KG/M2 | HEIGHT: 66 IN | WEIGHT: 180 LBS

## 2024-07-19 DIAGNOSIS — Z12.31 ENCOUNTER FOR SCREENING MAMMOGRAM FOR MALIGNANT NEOPLASM OF BREAST: ICD-10-CM

## 2024-07-19 PROCEDURE — 77067 SCR MAMMO BI INCL CAD: CPT

## 2024-07-19 PROCEDURE — 77063 BREAST TOMOSYNTHESIS BI: CPT

## 2024-08-03 ENCOUNTER — OFFICE VISIT (OUTPATIENT)
Dept: URGENT CARE | Facility: CLINIC | Age: 59
End: 2024-08-03
Payer: COMMERCIAL

## 2024-08-03 VITALS
RESPIRATION RATE: 16 BRPM | SYSTOLIC BLOOD PRESSURE: 112 MMHG | WEIGHT: 176.8 LBS | DIASTOLIC BLOOD PRESSURE: 78 MMHG | HEART RATE: 105 BPM | TEMPERATURE: 99.7 F | BODY MASS INDEX: 28.54 KG/M2 | OXYGEN SATURATION: 98 %

## 2024-08-03 DIAGNOSIS — B34.9 VIRAL SYNDROME: Primary | ICD-10-CM

## 2024-08-03 LAB — S PYO AG THROAT QL: NEGATIVE

## 2024-08-03 PROCEDURE — 87636 SARSCOV2 & INF A&B AMP PRB: CPT | Performed by: PHYSICIAN ASSISTANT

## 2024-08-03 PROCEDURE — 99213 OFFICE O/P EST LOW 20 MIN: CPT | Performed by: PHYSICIAN ASSISTANT

## 2024-08-03 PROCEDURE — 87880 STREP A ASSAY W/OPTIC: CPT | Performed by: PHYSICIAN ASSISTANT

## 2024-08-03 RX ORDER — BROMPHENIRAMINE MALEATE, PSEUDOEPHEDRINE HYDROCHLORIDE, AND DEXTROMETHORPHAN HYDROBROMIDE 2; 30; 10 MG/5ML; MG/5ML; MG/5ML
10 SYRUP ORAL 4 TIMES DAILY PRN
Qty: 120 ML | Refills: 0 | Status: SHIPPED | OUTPATIENT
Start: 2024-08-03

## 2024-08-03 NOTE — PROGRESS NOTES
[facility]        NAME: Jen Clark is a 58 y.o. female  : 1965    MRN: [id number]  DATE: August 3, 2024  TIME: 10:56 AM    Assessment and Plan   Viral syndrome [B34.9]  1. Viral syndrome  Covid/Flu- Office Collect Normal    Covid/Flu- Office Collect Normal    POCT rapid ANTIGEN strepA    brompheniramine-pseudoephedrine-DM 30-2-10 MG/5ML syrup            Patient Instructions     Patient Instructions   - strep test         Follow up with PCP in 3-5 days.  Proceed to  ER if symptoms worsen.    Chief Complaint     Chief Complaint   Patient presents with    Fever     Reports fever of 100.7, congestion and cough that started last night and is requesting a Covid/Flu PCR. Using Theraflu and Advil that has been helpful for symptoms.          History of Present Illness       The patient is a 58-year-old female presenting today for a fever of 100.7, congestion and a cough that started last night.  Has been using TheraFlu and Advil which seem to help.  Would like to be COVID/flu tested via PCR.  She did recently have surgery on her knee.        Review of Systems   Review of Systems   Constitutional:  Positive for fever. Negative for activity change, appetite change, chills and fatigue.   HENT:  Positive for congestion and sore throat. Negative for ear pain, rhinorrhea, sinus pressure and sinus pain.    Eyes:  Negative for pain and visual disturbance.   Respiratory:  Positive for cough. Negative for chest tightness and shortness of breath.    Cardiovascular:  Negative for chest pain and palpitations.   Gastrointestinal:  Negative for abdominal pain, diarrhea, nausea and vomiting.   Genitourinary:  Negative for dysuria and hematuria.   Musculoskeletal:  Negative for arthralgias, back pain and myalgias.   Skin:  Negative for color change, pallor and rash.   Neurological:  Negative for seizures, syncope and headaches.   All other systems reviewed and are negative.        Current Medications       Current  Outpatient Medications:     brompheniramine-pseudoephedrine-DM 30-2-10 MG/5ML syrup, Take 10 mL by mouth 4 (four) times a day as needed for congestion or cough, Disp: 120 mL, Rfl: 0    Iron-Vitamin C 100-250 MG TABS, Take by mouth 3 (three) times a week, Disp: , Rfl:     Multiple Vitamin (MULTI-VITAMIN DAILY) TABS, Take 1 tablet by mouth daily, Disp: , Rfl:     pramipexole (MIRAPEX) 0.25 mg tablet, TAKE 1 TABLET DAILY AFTER DINNER, Disp: 90 tablet, Rfl: 3    Semaglutide-Weight Management (WEGOVY) 0.5 MG/0.5ML, Inject 0.5 mL (0.5 mg total) under the skin once a week for 28 days, Disp: 2 mL, Rfl: 0    amoxicillin (AMOXIL) 500 MG tablet, Take 4 tablets (2,000 mg total) by mouth 60 minutes pre-procedure (Patient not taking: Reported on 8/3/2024), Disp: 4 tablet, Rfl: 2    celecoxib (CeleBREX) 200 mg capsule, TAKE 1 CAPSULE TWICE A DAY (Patient not taking: Reported on 8/3/2024), Disp: 180 capsule, Rfl: 0    gabapentin (NEURONTIN) 300 mg capsule, TAKE 1 CAPSULE AT BEDTIME (Patient not taking: Reported on 8/3/2024), Disp: 90 capsule, Rfl: 1    Insulin Pen Needle 34G X 3.5 MM MISC, Use with Zepbound injections once a week (Patient not taking: Reported on 4/17/2024), Disp: 4 each, Rfl: 11    LORazepam (ATIVAN) 0.5 mg tablet, Take 1 tablet (0.5 mg total) by mouth as needed for anxiety (Launch) Take as need for dental work (Patient not taking: Reported on 4/17/2024), Disp: 30 tablet, Rfl: 0    naloxone (NARCAN) 4 mg/0.1 mL nasal spray, Administer 1 spray into a nostril. If no response after 2-3 minutes, give another dose in the other nostril using a new spray., Disp: 1 each, Rfl: 1    Sodium Fluoride 5000 PPM 1.1 % PSTE, , Disp: , Rfl:     tretinoin (RETIN-A) 0.025 % cream, Apply thin film topically once daily (Patient not taking: Reported on 8/3/2024), Disp: 45 g, Rfl: 3    valACYclovir (VALTREX) 1,000 mg tablet, Take 2 tablets twice a day as needed for cold sore x 1 day., Disp: 4 tablet, Rfl: 2    Current Allergies      Allergies as of 08/03/2024 - Reviewed 08/03/2024   Allergen Reaction Noted    Codeine Dizziness 07/07/2008            The following portions of the patient's history were reviewed and updated as appropriate: allergies, current medications, past family history, past medical history, past social history, past surgical history and problem list.     Past Medical History:   Diagnosis Date    Arthralgia of right acromioclavicular joint     Lyme disease     Postmenopausal bleeding     Situational anxiety     Small bowel obstruction (HCC)     Strain of deltoid muscle, left, initial encounter        Past Surgical History:   Procedure Laterality Date    COLONOSCOPY  2013    DILATION AND CURETTAGE OF UTERUS      OVARIAN CYST REMOVAL  1995    NM ARTHRP ACETBLR/PROX FEM PROSTC AGRFT/ALGRFT Left 4/1/2024    Procedure: ARTHROPLASTY HIP TOTAL ANTERIOR,NAVIGATED - same day;  Surgeon: Blaze Gallegos DO;  Location: [facility] OR;  Service: Orthopedics    NM LAPS ABD PRTM&OMENTUM DX W/WO SPEC BR/WA SPX N/A 2/21/2021    Procedure: LAPAROSCOPY DIAGNOSTIC, exploratory laparotomy, lysis of adhesions, REDUCTION OF INTERNAL HERNIA;  Surgeon: Cris Luevano MD;  Location: [facility] OR;  Service: General       Family History   Problem Relation Age of Onset    Atrial fibrillation Mother     Hyperlipidemia Mother     Cancer Mother     Leukemia Mother 82    Heart disease Father     Hypertension Father     Hyperlipidemia Father     Diabetes Father     No Known Problems Sister     No Known Problems Maternal Grandmother     Prostate cancer Maternal Grandfather 50        Richard Marie    No Known Problems Paternal Grandmother     Diabetes Brother     No Known Problems Maternal Aunt     No Known Problems Maternal Aunt     No Known Problems Maternal Aunt     Prostate cancer Maternal Uncle 59    Breast cancer Paternal Aunt 35    No Known Problems Paternal Aunt          Medications have been verified.        Objective   /78   Pulse 105   Temp  99.7 °F (37.6 °C) (Tympanic)   Resp 16   Wt 80.2 kg (176 lb 12.8 oz)   LMP 06/22/2016   SpO2 98%   BMI 28.54 kg/m²        Physical Exam     Physical Exam  Vitals and nursing note reviewed.   Constitutional:       General: She is not in acute distress.     Appearance: Normal appearance. She is well-developed and normal weight. She is not ill-appearing, toxic-appearing or diaphoretic.   HENT:      Head: Normocephalic and atraumatic.      Right Ear: Tympanic membrane, ear canal and external ear normal. No drainage, swelling or tenderness. No middle ear effusion. There is no impacted cerumen. Tympanic membrane is not erythematous.      Left Ear: Tympanic membrane, ear canal and external ear normal. No drainage, swelling or tenderness.  No middle ear effusion. There is no impacted cerumen. Tympanic membrane is not erythematous.      Nose: Nose normal. No congestion or rhinorrhea.      Mouth/Throat:      Mouth: Mucous membranes are moist. No oral lesions.      Pharynx: Oropharynx is clear. Uvula midline. Posterior oropharyngeal erythema present. No pharyngeal swelling, oropharyngeal exudate or uvula swelling.      Tonsils: No tonsillar exudate or tonsillar abscesses. 0 on the right. 0 on the left.   Eyes:      Extraocular Movements:      Right eye: Normal extraocular motion.      Left eye: Normal extraocular motion.      Conjunctiva/sclera: Conjunctivae normal.      Pupils: Pupils are equal, round, and reactive to light.   Cardiovascular:      Rate and Rhythm: Normal rate and regular rhythm.      Heart sounds: Normal heart sounds. No murmur heard.     No friction rub. No gallop.   Pulmonary:      Effort: Pulmonary effort is normal. No respiratory distress.      Breath sounds: Normal breath sounds. No stridor. No wheezing, rhonchi or rales.   Chest:      Chest wall: No tenderness.   Abdominal:      General: Abdomen is flat. Bowel sounds are normal. There is no distension.      Palpations: Abdomen is soft.       Tenderness: There is no abdominal tenderness. There is no guarding.   Musculoskeletal:         General: Normal range of motion.   Lymphadenopathy:      Cervical: Cervical adenopathy present.   Skin:     General: Skin is warm and dry.      Capillary Refill: Capillary refill takes less than 2 seconds.   Neurological:      Mental Status: She is alert.

## 2024-08-03 NOTE — LETTER
August 3, 2024     Patient: Jen Clark  YOB: 1965  Date of Visit: 8/3/2024      To Whom it May Concern:    Jen Clark is under my professional care. Jen was seen in my office on 8/3/2024. Jen may return to work on 8/6/24 .    If you have any questions or concerns, please don't hesitate to call.         Sincerely,          Louise Rodriguez PA-C        CC: No Recipients

## 2024-08-06 ENCOUNTER — NURSE TRIAGE (OUTPATIENT)
Age: 59
End: 2024-08-06

## 2024-08-06 DIAGNOSIS — K21.9 GASTROESOPHAGEAL REFLUX DISEASE WITHOUT ESOPHAGITIS: Primary | ICD-10-CM

## 2024-08-06 RX ORDER — PANTOPRAZOLE SODIUM 40 MG/1
TABLET, DELAYED RELEASE ORAL
Qty: 30 TABLET | Refills: 1 | Status: SHIPPED | OUTPATIENT
Start: 2024-08-06

## 2024-08-06 NOTE — TELEPHONE ENCOUNTER
"Patient called in to let PCP know she was at Urgent care 8/3/24 and tested positive for Covid. She does not want to go on Paxlovid. Today she feels slightly lightheaded, fatigue and slightly nauseated. Denies other symptoms. Home care instructions given for hydration and BRAT diet. She will call with any question or concerns.  Patient stated she did not take her Wegovy injection 8/4/24 due to having Covid and side effect she has been having of indigestion. She started with indigestion when dose of Wegovy increased. She has been using TUMS. She is asking if PCP has another recommendation for indigestion secondary to Wegovy.       Reason for Disposition   [1] COVID-19 diagnosed by positive lab test (e.g., PCR, rapid self-test kit) AND [2] mild symptoms (e.g., cough, fever, others) AND [3] no complications or SOB    Answer Assessment - Initial Assessment Questions  1. COVID-19 DIAGNOSIS: \"Who made your COVID-19 diagnosis?\" \"Was it confirmed by a positive lab test or self-test?\" If not diagnosed by a doctor (or NP/PA), ask \"Are there lots of cases (community spread) where you live?\" Note: See public health department website, if unsure.      [facility]gent [facility] (visit in EPIC)  2. COVID-19 EXPOSURE: \"Was there any known exposure to COVID before the symptoms began?\" CDC Definition of close contact: within 6 feet (2 meters) for a total of 15 minutes or more over a 24-hour period.       unknown  3. ONSET: \"When did the COVID-19 symptoms start?\"       8/3/24  4. WORST SYMPTOM: \"What is your worst symptom?\" (e.g., cough, fever, shortness of breath, muscle aches)      Fatigue-today  5. COUGH: \"Do you have a cough?\" If Yes, ask: \"How bad is the cough?\"        no  6. FEVER: \"Do you have a fever?\" If Yes, ask: \"What is your temperature, how was it measured, and when did it start?\"      Had fever at onset, denies fever today  7. RESPIRATORY STATUS: \"Describe your breathing?\" (e.g., shortness of breath, wheezing, unable to speak) " "      WNL  8. BETTER-SAME-WORSE: \"Are you getting better, staying the same or getting worse compared to yesterday?\"  If getting worse, ask, \"In what way?\"      better  9. HIGH RISK DISEASE: \"Do you have any chronic medical problems?\" (e.g., asthma, heart or lung disease, weak immune system, obesity, etc.)     Obesity  10. VACCINE: \"Have you had the COVID-19 vaccine?\" If Yes, ask: \"Which one, how many shots, when did you get it?\"        Yes-up to date  11. BOOSTER: \"Have you received your COVID-19 booster?\" If Yes, ask: \"Which one and when did you get it?\"        Yes-record in epic  12. PREGNANCY: \"Is there any chance you are pregnant?\" \"When was your last menstrual period?\"        Post-menopausal  13. OTHER SYMPTOMS: \"Do you have any other symptoms?\"  (e.g., chills, fatigue, headache, loss of smell or taste, muscle pain, sore throat)        Fatigue, lightheaded, mild nausea   14. O2 SATURATION MONITOR:  \"Do you use an oxygen saturation monitor (pulse oximeter) at home?\" If Yes, ask \"What is your reading (oxygen level) today?\" \"What is your usual oxygen saturation reading?\" (e.g., 95%)        N/a    Protocols used: Coronavirus (COVID-19) Diagnosed or Suspected-ADULT-[place]    "

## 2024-08-06 NOTE — TELEPHONE ENCOUNTER
OK to skip Wegovy this week.  I sent prescription for Protonix 40 mg daily that patient can use as needed for symptoms of acid reflux.  Hope she will be feeling better soon.      Thank you

## 2024-08-07 NOTE — TELEPHONE ENCOUNTER
"Called and spoke with patient in regards of provider message/instructions on her behalf. Patient verbalized understanding and states \" Thank you so much\"  "

## 2024-08-16 DIAGNOSIS — E66.09 CLASS 1 OBESITY DUE TO EXCESS CALORIES WITHOUT SERIOUS COMORBIDITY WITH BODY MASS INDEX (BMI) OF 30.0 TO 30.9 IN ADULT: Primary | ICD-10-CM

## 2024-08-16 RX ORDER — SEMAGLUTIDE 1 MG/.5ML
INJECTION, SOLUTION SUBCUTANEOUS
Qty: 2 ML | Refills: 1 | Status: SHIPPED | OUTPATIENT
Start: 2024-08-16

## 2024-08-23 ENCOUNTER — OFFICE VISIT (OUTPATIENT)
Dept: OBGYN CLINIC | Facility: CLINIC | Age: 59
End: 2024-08-23
Payer: COMMERCIAL

## 2024-08-23 VITALS
HEIGHT: 66 IN | WEIGHT: 177.4 LBS | HEART RATE: 105 BPM | SYSTOLIC BLOOD PRESSURE: 125 MMHG | BODY MASS INDEX: 28.51 KG/M2 | DIASTOLIC BLOOD PRESSURE: 80 MMHG

## 2024-08-23 DIAGNOSIS — M25.562 CHRONIC PAIN OF LEFT KNEE: ICD-10-CM

## 2024-08-23 DIAGNOSIS — G89.29 CHRONIC PAIN OF LEFT KNEE: ICD-10-CM

## 2024-08-23 DIAGNOSIS — M22.2X2 PATELLOFEMORAL DISORDER OF LEFT KNEE: Primary | ICD-10-CM

## 2024-08-23 DIAGNOSIS — M71.22 BAKER'S CYST OF KNEE, LEFT: ICD-10-CM

## 2024-08-23 LAB
APOB+LDLR+PCSK9 GENE MUT ANL BLD/T: NOT DETECTED
BRCA1+BRCA2 DEL+DUP + FULL MUT ANL BLD/T: NOT DETECTED
MLH1+MSH2+MSH6+PMS2 GN DEL+DUP+FUL M: NOT DETECTED

## 2024-08-23 PROCEDURE — 20610 DRAIN/INJ JOINT/BURSA W/O US: CPT | Performed by: ORTHOPAEDIC SURGERY

## 2024-08-23 PROCEDURE — 99214 OFFICE O/P EST MOD 30 MIN: CPT | Performed by: ORTHOPAEDIC SURGERY

## 2024-08-23 RX ORDER — BUPIVACAINE HYDROCHLORIDE 7.5 MG/ML
4 INJECTION, SOLUTION EPIDURAL; RETROBULBAR
Status: COMPLETED | OUTPATIENT
Start: 2024-08-23 | End: 2024-08-23

## 2024-08-23 RX ORDER — TRIAMCINOLONE ACETONIDE 40 MG/ML
80 INJECTION, SUSPENSION INTRA-ARTICULAR; INTRAMUSCULAR
Status: COMPLETED | OUTPATIENT
Start: 2024-08-23 | End: 2024-08-23

## 2024-08-23 RX ADMIN — TRIAMCINOLONE ACETONIDE 80 MG: 40 INJECTION, SUSPENSION INTRA-ARTICULAR; INTRAMUSCULAR at 15:30

## 2024-08-23 RX ADMIN — BUPIVACAINE HYDROCHLORIDE 4 ML: 7.5 INJECTION, SOLUTION EPIDURAL; RETROBULBAR at 15:30

## 2024-08-23 NOTE — PROGRESS NOTES
"Assessment/Plan:  1. Patellofemoral disorder of left knee  Large joint arthrocentesis: L knee      2. Baker's cyst of knee, left  Large joint arthrocentesis: L knee      3. Chronic pain of left knee          Scribe Attestation      I,:  Eufemia Cameron am acting as a scribe while in the presence of the attending physician.:       I,:  Blaze Gallegos, DO personally performed the services described in this documentation    as scribed in my presence.:           58-year-old female presents today for follow-up evaluation of left knee pain.  She notes significant improvement in patellofemoral symptoms, however has new onset of symptoms and physical exam most consistent with Baker's cyst of the left knee. Diagnostics reviewed and physical exam performed.  Diagnosis, treatment options and associated risks were discussed with the patient including no treatment, nonsurgical treatment and potential for surgical intervention.  The patient was given the opportunity to ask questions regarding each. Patient was offered, accepted, and received a cortisone injection of the left knee. Risks and benefits of CSI were discussed with the patient. The corticosteroid injection was administered without any immediate complication and was well tolerated by the patient. This was done under sterile technique. Post injection instructions and expectations were discussed. It was explained to the patient that cortisone injections can be repeated as early as every 3 months. We will plan to see her back on an as needed basis.          Large joint arthrocentesis: L knee  Universal Protocol:  Consent: Verbal consent obtained.  Risks and benefits: risks, benefits and alternatives were discussed  Consent given by: patient  Time out: Immediately prior to procedure a \"time out\" was called to verify the correct patient, procedure, equipment, support staff and site/side marked as required.  Patient understanding: patient states understanding of the procedure " being performed  Site marked: the operative site was marked  Patient identity confirmed: verbally with patient  Supporting Documentation  Indications: pain and diagnostic evaluation   Procedure Details  Location: knee - L knee  Preparation: Patient was prepped and draped in the usual sterile fashion  Needle size: 20 G  Approach: anterolateral  Medications administered: 80 mg triamcinolone acetonide 40 mg/mL; 4 mL bupivacaine (PF) 0.75 %    Patient tolerance: patient tolerated the procedure well with no immediate complications  Dressing:  Sterile dressing applied            Subjective: Follow up evaluation of left knee pain    Patient ID: Jen Clark is a 58 y.o. female presents today for follow-up evaluation of left left knee pain.  She was last seen 8 weeks ago where she was diagnosed with patellofemoral dysfunction.  She completed home exercises with significant relief of her pain until approximately three weeks ago she began developing posterior knee pain with no obvious mechanism of injury or inciting event. She describes 5-6/10 pain aggravated by increased activity such as walking her dogs and up and down hills. She notes fullness in the posterior knee with full flexion and full extension.     Review of Systems   Constitutional:  Negative for chills and fever.   HENT:  Negative for ear pain and sore throat.    Eyes:  Negative for pain and visual disturbance.   Respiratory:  Negative for cough and shortness of breath.    Cardiovascular:  Negative for chest pain and palpitations.   Gastrointestinal:  Negative for abdominal pain and vomiting.   Genitourinary:  Negative for dysuria and hematuria.   Musculoskeletal:  Positive for arthralgias and joint swelling. Negative for back pain.   Skin:  Negative for color change and rash.   Neurological:  Negative for seizures and syncope.   All other systems reviewed and are negative.        Past Medical History:   Diagnosis Date    Arthralgia of right acromioclavicular  joint     Lyme disease     Postmenopausal bleeding     Situational anxiety     Small bowel obstruction (HCC)     Strain of deltoid muscle, left, initial encounter        Past Surgical History:   Procedure Laterality Date    COLONOSCOPY  2013    DILATION AND CURETTAGE OF UTERUS      OVARIAN CYST REMOVAL  1995    [place] ARTHRP ACETBLR/PROX FEM PROSTC AGRFT/ALGRFT Left 4/1/2024    Procedure: ARTHROPLASTY HIP TOTAL ANTERIOR,NAVIGATED - same day;  Surgeon: Blaze Gallegos DO;  Location: [facility];  Service: Orthopedics    NV LAPS ABD PRTM&OMENTUM DX W/WO SPEC BR/WA SPX N/A 2/21/2021    Procedure: LAPAROSCOPY DIAGNOSTIC, exploratory laparotomy, lysis of adhesions, REDUCTION OF INTERNAL HERNIA;  Surgeon: Cris Luevano MD;  Location: [facility];  Service: General       Family History   Problem Relation Age of Onset    Atrial fibrillation Mother     Hyperlipidemia Mother     Cancer Mother     Leukemia Mother 82    Heart disease Father     Hypertension Father     Hyperlipidemia Father     Diabetes Father     No Known Problems Sister     No Known Problems Maternal Grandmother     Prostate cancer Maternal Grandfather 50        Richard Cynthia    No Known Problems Paternal Grandmother     Diabetes Brother     No Known Problems Maternal Aunt     No Known Problems Maternal Aunt     No Known Problems Maternal Aunt     Prostate cancer Maternal Uncle 59    Breast cancer Paternal Aunt 35    No Known Problems Paternal Aunt        Social History     Occupational History    Not on file   Tobacco Use    Smoking status: Never    Smokeless tobacco: Never   Vaping Use    Vaping status: Never Used   Substance and Sexual Activity    Alcohol use: Yes     Alcohol/week: 2.0 standard drinks of alcohol     Types: 2 Cans of beer per week     Comment: occasional    Drug use: Never    Sexual activity: Yes     Partners: Male     Birth control/protection: Post-menopausal         Current Outpatient Medications:     brompheniramine-pseudoephedrine-DM 30-2-10  MG/5ML syrup, Take 10 mL by mouth 4 (four) times a day as needed for congestion or cough, Disp: 120 mL, Rfl: 0    Iron-Vitamin C 100-250 MG TABS, Take by mouth 3 (three) times a week, Disp: , Rfl:     Multiple Vitamin (MULTI-VITAMIN DAILY) TABS, Take 1 tablet by mouth daily, Disp: , Rfl:     naloxone (NARCAN) 4 mg/0.1 mL nasal spray, Administer 1 spray into a nostril. If no response after 2-3 minutes, give another dose in the other nostril using a new spray., Disp: 1 each, Rfl: 1    pantoprazole (PROTONIX) 40 mg tablet, Take 1 tablet once a day as needed for acid reflux symptoms, Disp: 30 tablet, Rfl: 1    pramipexole (MIRAPEX) 0.25 mg tablet, TAKE 1 TABLET DAILY AFTER DINNER, Disp: 90 tablet, Rfl: 3    Semaglutide-Weight Management (Wegovy) 1 MG/0.5ML, Inject 1 mg under the skin weekly, Disp: 2 mL, Rfl: 1    amoxicillin (AMOXIL) 500 MG tablet, Take 4 tablets (2,000 mg total) by mouth 60 minutes pre-procedure (Patient not taking: Reported on 8/3/2024), Disp: 4 tablet, Rfl: 2    celecoxib (CeleBREX) 200 mg capsule, TAKE 1 CAPSULE TWICE A DAY (Patient not taking: Reported on 8/3/2024), Disp: 180 capsule, Rfl: 0    gabapentin (NEURONTIN) 300 mg capsule, TAKE 1 CAPSULE AT BEDTIME (Patient not taking: Reported on 8/3/2024), Disp: 90 capsule, Rfl: 1    Insulin Pen Needle 34G X 3.5 MM MISC, Use with Zepbound injections once a week (Patient not taking: Reported on 4/17/2024), Disp: 4 each, Rfl: 11    LORazepam (ATIVAN) 0.5 mg tablet, Take 1 tablet (0.5 mg total) by mouth as needed for anxiety (Launch) Take as need for dental work (Patient not taking: Reported on 4/17/2024), Disp: 30 tablet, Rfl: 0    Sodium Fluoride 5000 PPM 1.1 % PSTE, , Disp: , Rfl:     tretinoin (RETIN-A) 0.025 % cream, Apply thin film topically once daily (Patient not taking: Reported on 8/3/2024), Disp: 45 g, Rfl: 3    valACYclovir (VALTREX) 1,000 mg tablet, Take 2 tablets twice a day as needed for cold sore x 1 day., Disp: 4 tablet, Rfl:  2    Allergies   Allergen Reactions    Codeine Dizziness       Objective:  Vitals:    08/23/24 1538   BP: 125/80   Pulse: 105       Body mass index is 28.63 kg/m².    Left Knee Exam     Tenderness   The patient is experiencing tenderness in the medial joint line.    Range of Motion   Extension:  0   Flexion:  120     Tests   Varus: negative Valgus: negative  Lachman:  Anterior - negative        Other   Erythema: absent  Sensation: normal  Pulse: present  Swelling: none  Effusion: no effusion present    Comments:  No significant effusion  Palpable Baker's Cyst   Knee is stable at 0, 30, and 90 degrees  Skin is warm and dry to touch with no signs of erythema, ecchymosis, or infection   Flexor and extensor mechanisms are intact   Patella tracks centrally with palpable crepitus  Equivocal grind   Calf compartments are soft and supple  2+ DP and PT pulses with brisk capillary refill to the toes  Sural, saphenous, tibial, superficial, and deep peroneal motor and sensory distributions intact  Sensation light touch intact distally        Left Hip Exam     Tenderness   The patient is experiencing no tenderness.     Range of Motion   Abduction:  45   Adduction:  30   Flexion:  120   External rotation:  50   Internal rotation: 30     Muscle Strength   The patient has normal left hip strength.     Other   Erythema: absent  Sensation: normal  Pulse: present    Comments:  Well healed anterior hip scar  2+ TP and DP pulses with brisk capillary refill to the toes  Sural, saphenous, tibial, superficial and deep peroneal motor and sensory distribution intact  Sensation to light touch               Observations   Left Knee   Negative for effusion.       Physical Exam  Vitals and nursing note reviewed.   Constitutional:       General: She is not in acute distress.     Appearance: Normal appearance.   HENT:      Head: Normocephalic and atraumatic.      Right Ear: External ear normal.      Left Ear: External ear normal.      Nose: Nose  normal.   Eyes:      General: No scleral icterus.     Extraocular Movements: Extraocular movements intact.      Conjunctiva/sclera: Conjunctivae normal.   Cardiovascular:      Rate and Rhythm: Normal rate and regular rhythm.      Pulses: Normal pulses.   Pulmonary:      Effort: Pulmonary effort is normal. No respiratory distress.   Abdominal:      Palpations: Abdomen is soft.   Musculoskeletal:      Cervical back: Normal range of motion and neck supple.      Left knee: No effusion.      Comments: See Ortho Exam   Skin:     General: Skin is warm and dry.      Capillary Refill: Capillary refill takes less than 2 seconds.   Neurological:      General: No focal deficit present.      Mental Status: She is alert and oriented to person, place, and time.   Psychiatric:         Mood and Affect: Mood normal.         Behavior: Behavior normal.         Thought Content: Thought content normal.         Judgment: Judgment normal.            This document was created using speech voice recognition software.   Grammatical errors, random word insertions, pronoun errors, and incomplete sentences are an occasional consequence of this system due to software limitations, ambient noise, and hardware issues.   Any formal questions or concerns about content, text, or information contained within the body of this dictation should be directly addressed to the provider for clarification.

## 2024-09-04 DIAGNOSIS — E66.09 CLASS 1 OBESITY DUE TO EXCESS CALORIES WITHOUT SERIOUS COMORBIDITY WITH BODY MASS INDEX (BMI) OF 30.0 TO 30.9 IN ADULT: Primary | ICD-10-CM

## 2024-11-15 ENCOUNTER — TELEPHONE (OUTPATIENT)
Age: 59
End: 2024-11-15

## 2024-11-15 DIAGNOSIS — E66.09 CLASS 1 OBESITY DUE TO EXCESS CALORIES WITHOUT SERIOUS COMORBIDITY WITH BODY MASS INDEX (BMI) OF 30.0 TO 30.9 IN ADULT: Primary | ICD-10-CM

## 2024-11-15 DIAGNOSIS — E66.811 CLASS 1 OBESITY DUE TO EXCESS CALORIES WITHOUT SERIOUS COMORBIDITY WITH BODY MASS INDEX (BMI) OF 30.0 TO 30.9 IN ADULT: Primary | ICD-10-CM

## 2024-11-15 NOTE — TELEPHONE ENCOUNTER
Patient used to be on Wegovy but had stopped it. She said that she is ready to go back on it. She said she believes that she would have to start over with the lowest dose. She would like it sent to [facility] in [place], [place]. I did make her aware she may need to schedule an appointment, she verbalized understanding and said to call her if she does need to make an appointment.

## 2025-01-07 ENCOUNTER — TELEPHONE (OUTPATIENT)
Dept: PHYSICAL THERAPY | Facility: CLINIC | Age: 60
End: 2025-01-07

## 2025-01-07 NOTE — TELEPHONE ENCOUNTER
On 1/2/25, I received a call from this patient in regards to an EOB she received from [facility] about her 5/22/24 visit. She stated the therapist billed a code wrong and her insurance was not paying.   On 1/7, our  Susan responded to my email inquiry, clarifying. There was no auth received for the patients 5/22/24 visit. [facility] did not require auths for 6/1/24 and beyond, however this was missed prior to that. This was appealed and denied by [facility]. [facility] wrote off the balance and the patient is only responsible for the copay.   1/7, called patient to let her know this was resolved and she should not receive any bills for this visit aside from the copay. Provided billing phone number to patient for any further concerns. Patient was appreciative for the call back.

## 2025-01-17 ENCOUNTER — TELEPHONE (OUTPATIENT)
Age: 60
End: 2025-01-17

## 2025-01-17 NOTE — TELEPHONE ENCOUNTER
Patient called. Reports experiencing possible shingles outbreak. Pt requesting medication to be prescribed. Offered appt unable to come in to appt. Advised patient to be further evaluated at .

## 2025-02-15 DIAGNOSIS — E66.09 CLASS 1 OBESITY DUE TO EXCESS CALORIES WITHOUT SERIOUS COMORBIDITY WITH BODY MASS INDEX (BMI) OF 30.0 TO 30.9 IN ADULT: ICD-10-CM

## 2025-02-15 DIAGNOSIS — E66.811 CLASS 1 OBESITY DUE TO EXCESS CALORIES WITHOUT SERIOUS COMORBIDITY WITH BODY MASS INDEX (BMI) OF 30.0 TO 30.9 IN ADULT: ICD-10-CM

## 2025-02-18 ENCOUNTER — TELEPHONE (OUTPATIENT)
Age: 60
End: 2025-02-18

## 2025-02-18 DIAGNOSIS — E78.49 OTHER HYPERLIPIDEMIA: ICD-10-CM

## 2025-02-18 DIAGNOSIS — E66.09 CLASS 1 OBESITY DUE TO EXCESS CALORIES WITHOUT SERIOUS COMORBIDITY WITH BODY MASS INDEX (BMI) OF 30.0 TO 30.9 IN ADULT: Primary | ICD-10-CM

## 2025-02-18 DIAGNOSIS — Z13.1 ENCOUNTER FOR SCREENING FOR DIABETES MELLITUS: ICD-10-CM

## 2025-02-18 DIAGNOSIS — E66.811 CLASS 1 OBESITY DUE TO EXCESS CALORIES WITHOUT SERIOUS COMORBIDITY WITH BODY MASS INDEX (BMI) OF 30.0 TO 30.9 IN ADULT: Primary | ICD-10-CM

## 2025-02-18 NOTE — TELEPHONE ENCOUNTER
Please let the patient know that blood work orders were placed.     Please follow-up regarding Wegovy denial concern.      Thank you

## 2025-02-18 NOTE — TELEPHONE ENCOUNTER
Please order labs for pt's upcoming appt on 04/11/25 and contact to advise once completed.    Patient was wanting to know if Dr Martinez received a [facility] We[facility]?

## 2025-02-19 NOTE — TELEPHONE ENCOUNTER
Spoke with patient about Wegovy.  She had stopped the wegovy in August due to getting sick with covid and vomitting,  then she could not find it and just started taking 0.25 mg.  I will work on PA with this information, however I did tell her she may need an appointment with the provider due to needing current OV notes and weight and bmi.  Will let her know once I receive an answer

## 2025-02-19 NOTE — TELEPHONE ENCOUNTER
Called and left a message of the B/W orders are placed and will give her a callback regarding the Wegovy

## 2025-02-21 NOTE — TELEPHONE ENCOUNTER
Spoke with patient and she stated that she has the ..25, .5 and 1 mg dosages in her refrigerator. I explained that I will do prior auth for 1.7 mg.  Patients wegovy has been approved.

## 2025-02-28 DIAGNOSIS — E66.811 CLASS 1 OBESITY DUE TO EXCESS CALORIES WITHOUT SERIOUS COMORBIDITY WITH BODY MASS INDEX (BMI) OF 30.0 TO 30.9 IN ADULT: Primary | ICD-10-CM

## 2025-02-28 DIAGNOSIS — E66.09 CLASS 1 OBESITY DUE TO EXCESS CALORIES WITHOUT SERIOUS COMORBIDITY WITH BODY MASS INDEX (BMI) OF 30.0 TO 30.9 IN ADULT: Primary | ICD-10-CM

## 2025-02-28 DIAGNOSIS — E66.811 CLASS 1 OBESITY DUE TO EXCESS CALORIES WITHOUT SERIOUS COMORBIDITY WITH BODY MASS INDEX (BMI) OF 30.0 TO 30.9 IN ADULT: ICD-10-CM

## 2025-02-28 DIAGNOSIS — E66.09 CLASS 1 OBESITY DUE TO EXCESS CALORIES WITHOUT SERIOUS COMORBIDITY WITH BODY MASS INDEX (BMI) OF 30.0 TO 30.9 IN ADULT: ICD-10-CM

## 2025-02-28 RX ORDER — SEMAGLUTIDE 0.5 MG/.5ML
INJECTION, SOLUTION SUBCUTANEOUS
Qty: 2 ML | Refills: 0 | OUTPATIENT
Start: 2025-02-28

## 2025-03-27 ENCOUNTER — APPOINTMENT (OUTPATIENT)
Dept: RADIOLOGY | Facility: CLINIC | Age: 60
End: 2025-03-27
Payer: COMMERCIAL

## 2025-03-27 ENCOUNTER — OFFICE VISIT (OUTPATIENT)
Dept: OBGYN CLINIC | Facility: CLINIC | Age: 60
End: 2025-03-27
Payer: COMMERCIAL

## 2025-03-27 VITALS — HEIGHT: 66 IN | WEIGHT: 183 LBS | BODY MASS INDEX: 29.41 KG/M2

## 2025-03-27 DIAGNOSIS — Z96.642 STATUS POST TOTAL REPLACEMENT OF LEFT HIP: Primary | ICD-10-CM

## 2025-03-27 DIAGNOSIS — Z96.642 STATUS POST TOTAL REPLACEMENT OF LEFT HIP: ICD-10-CM

## 2025-03-27 PROBLEM — M16.12 PRIMARY OSTEOARTHRITIS OF LEFT HIP: Status: RESOLVED | Noted: 2022-11-18 | Resolved: 2025-03-27

## 2025-03-27 PROCEDURE — 73502 X-RAY EXAM HIP UNI 2-3 VIEWS: CPT

## 2025-03-27 PROCEDURE — 99213 OFFICE O/P EST LOW 20 MIN: CPT | Performed by: PHYSICIAN ASSISTANT

## 2025-03-27 RX ORDER — AMOXICILLIN 500 MG/1
2000 TABLET, FILM COATED ORAL
Qty: 8 TABLET | Refills: 2 | Status: SHIPPED | OUTPATIENT
Start: 2025-03-27 | End: 2025-06-25

## 2025-03-27 NOTE — PROGRESS NOTES
Name: Jen Clark      : 1965      MRN: [id number]  Encounter Provider: Nam Castaneda PA-C  Encounter Date: 3/27/2025   Encounter department: [facility]  :  Assessment & Plan  Status post total replacement of left hip    Orders:    XR hip/pelv 2-3 vws left if performed; Future         Jen Clark is a pleasant 59 y.o. female presenting today for follow-up 1 year after a direct anterior left total hip arthroplasty.  She is doing exceptionally well.  The prosthesis remains stable on imaging and exam.  She is pain-free with activities of daily living and enjoyment and does not report any restrictions due to her left hip.  Therefore, she may now follow-up on an as-needed basis.  She does understand the need for antibiotic prophylaxis for any dental appointments, and will notify us if she needs a prescription.  We also discussed that since she is doing well from the cortisone injection in her left knee last August, we do not need any further intervention at this time.  She can return at any point if her left knee becomes more consistently painful or limiting her in her ADLs.  She expressed understanding all of her questions were addressed today    I have personally reviewed pertinent films in PACS of the updated x-rays taken today of her left hip and compared them to previous postoperative films.  There is been no change in alignment of the total hip prosthesis that is well aligned and well-fixed.  Leg length, offset, and acetabular version all appear appropriate and unchanged.    Subjective: 1 year status post direct anterior left total of arthroplasty    History: Jen Clark is a 59 y.o. female presenting today for follow-up of surgery.  She reports that she is very pleased with her surgery and recovery.  She does not have any significant activity related pain in the left hip or groin.  She is able to perform activities of daily living, employment, and  "enjoyment without restriction.  She has been compliant with antibiotic prophylaxis before dental appointments.  She reports that she does occasionally have a twinge of left knee pain as well, which we have previously treated for osteoarthritis with an injection last August.    Estimated body mass index is 29.54 kg/m² as calculated from the following:    Height as of this encounter: 5' 6\" (1.676 m).    Weight as of this encounter: 83 kg (183 lb).    Lab Results   Component Value Date    HGBA1C 5.9 (H) 03/09/2024       Social History     Occupational History    Not on file   Tobacco Use    Smoking status: Never    Smokeless tobacco: Never   Vaping Use    Vaping status: Never Used   Substance and Sexual Activity    Alcohol use: Yes     Alcohol/week: 2.0 standard drinks of alcohol     Types: 2 Cans of beer per week     Comment: occasional    Drug use: Never    Sexual activity: Yes     Partners: Male     Birth control/protection: Post-menopausal       Objective:  Left Hip Exam     Tenderness   The patient is experiencing no tenderness.     Range of Motion   Abduction:  45 normal   Adduction:  30 normal   Extension:  0 normal   Flexion:  130 normal   External rotation:  normal Left hip external rotation: 45.  Internal rotation: 30 normal     Muscle Strength   Abduction: 5/5   Adduction: 5/5   Flexion: 5/5     Tests   ESA: negative  Salomón: negative    Other   Erythema: absent  Scars: present  Sensation: normal  Pulse: present    Comments:  Negative Stinchfield  Thigh and calf soft and nontender  SILT L2-S1  Anterior surgical scar well-healed  Ambulates with a normal symmetrical gait              There were no vitals filed for this visit.    Past Medical History:   Diagnosis Date    Arthralgia of right acromioclavicular joint 7/24/2017    Lyme disease     Postmenopausal bleeding 3/22/2021    Situational anxiety 11/8/2017    Small bowel obstruction (HCC)     Strain of deltoid muscle, left, initial encounter 7/13/2021 "       Past Surgical History:   Procedure Laterality Date    COLONOSCOPY  2013    DILATION AND CURETTAGE OF UTERUS      OVARIAN CYST REMOVAL  1995    MA ARTHRP ACETBLR/PROX FEM PROSTC AGRFT/ALGRFT Left 4/1/2024    Procedure: ARTHROPLASTY HIP TOTAL ANTERIOR,NAVIGATED - same day;  Surgeon: Blaze Gallegos DO;  Location: [facility];  Service: Orthopedics    MA LAPS ABD PRTM&OMENTUM DX W/WO SPEC BR/WA SPX N/A 2/21/2021    Procedure: LAPAROSCOPY DIAGNOSTIC, exploratory laparotomy, lysis of adhesions, REDUCTION OF INTERNAL HERNIA;  Surgeon: Cris Luevano MD;  Location: [facility];  Service: General       Family History   Problem Relation Age of Onset    Atrial fibrillation Mother     Hyperlipidemia Mother     Cancer Mother     Leukemia Mother 82    Heart disease Father     Hypertension Father     Hyperlipidemia Father     Diabetes Father     No Known Problems Sister     No Known Problems Maternal Grandmother     Prostate cancer Maternal Grandfather 50        Richard Cynthia    No Known Problems Paternal Grandmother     Diabetes Brother     No Known Problems Maternal Aunt     No Known Problems Maternal Aunt     No Known Problems Maternal Aunt     Prostate cancer Maternal Uncle 59    Breast cancer Paternal Aunt 35    No Known Problems Paternal Aunt          Current Outpatient Medications:     brompheniramine-pseudoephedrine-DM 30-2-10 MG/5ML syrup, Take 10 mL by mouth 4 (four) times a day as needed for congestion or cough, Disp: 120 mL, Rfl: 0    Iron-Vitamin C 100-250 MG TABS, Take by mouth 3 (three) times a week, Disp: , Rfl:     Multiple Vitamin (MULTI-VITAMIN DAILY) TABS, Take 1 tablet by mouth daily, Disp: , Rfl:     pantoprazole (PROTONIX) 40 mg tablet, Take 1 tablet once a day as needed for acid reflux symptoms, Disp: 30 tablet, Rfl: 1    pramipexole (MIRAPEX) 0.25 mg tablet, TAKE 1 TABLET DAILY AFTER DINNER, Disp: 90 tablet, Rfl: 3    Semaglutide-Weight Management (WEGOVY) 0.5 MG/0.5ML, Inject 0.5 mL (0.5 mg total)  under the skin once a week, Disp: 2 mL, Rfl: 1    Insulin Pen Needle 34G X 3.5 MM MISC, Use with Zepbound injections once a week (Patient not taking: Reported on 3/27/2025), Disp: 4 each, Rfl: 11    LORazepam (ATIVAN) 0.5 mg tablet, Take 1 tablet (0.5 mg total) by mouth as needed for anxiety (Launch) Take as need for dental work (Patient not taking: Reported on 4/17/2024), Disp: 30 tablet, Rfl: 0    Sodium Fluoride 5000 PPM 1.1 % PSTE, , Disp: , Rfl:     tretinoin (RETIN-A) 0.025 % cream, Apply thin film topically once daily (Patient not taking: Reported on 3/27/2025), Disp: 45 g, Rfl: 3    valACYclovir (VALTREX) 1,000 mg tablet, Take 2 tablets twice a day as needed for cold sore x 1 day., Disp: 4 tablet, Rfl: 2    Allergies   Allergen Reactions    Codeine Dizziness         Review of Systems   Constitutional: Negative.    HENT: Negative.     Eyes: Negative.    Respiratory: Negative.     Cardiovascular: Negative.    Gastrointestinal: Negative.    Endocrine: Negative.    Genitourinary: Negative.    Musculoskeletal:  Positive for arthralgias, joint swelling and myalgias.        Left knee   Skin: Negative.    Allergic/Immunologic: Negative.    Neurological: Negative.    Hematological: Negative.    Psychiatric/Behavioral: Negative.           Physical Exam  Vitals and nursing note reviewed.   Constitutional:       Appearance: Normal appearance. She is well-developed.      Comments: Body mass index is 29.54 kg/m².   HENT:      Head: Normocephalic and atraumatic.      Right Ear: External ear normal.      Left Ear: External ear normal.   Eyes:      Extraocular Movements: Extraocular movements intact.      Conjunctiva/sclera: Conjunctivae normal.   Cardiovascular:      Rate and Rhythm: Normal rate.      Pulses: Normal pulses.   Pulmonary:      Effort: Pulmonary effort is normal.   Abdominal:      Palpations: Abdomen is soft.   Musculoskeletal:      Cervical back: Normal range of motion.      Comments: See ortho exam   Skin:      General: Skin is warm and dry.   Neurological:      General: No focal deficit present.      Mental Status: She is alert and oriented to person, place, and time. Mental status is at baseline.   Psychiatric:         Mood and Affect: Mood normal.         Behavior: Behavior normal.         Thought Content: Thought content normal.         Judgment: Judgment normal.       This document was created using speech voice recognition software.   Grammatical errors, random word insertions, pronoun errors, and incomplete sentences are an occasional consequence of this system due to software limitations, ambient noise, and hardware issues.   Any formal questions or concerns about content, text, or information contained within the body of this dictation should be directly addressed to the provider for clarification.

## 2025-03-29 ENCOUNTER — APPOINTMENT (OUTPATIENT)
Dept: LAB | Facility: HOSPITAL | Age: 60
End: 2025-03-29
Payer: COMMERCIAL

## 2025-03-29 DIAGNOSIS — E66.811 CLASS 1 OBESITY DUE TO EXCESS CALORIES WITHOUT SERIOUS COMORBIDITY WITH BODY MASS INDEX (BMI) OF 30.0 TO 30.9 IN ADULT: ICD-10-CM

## 2025-03-29 DIAGNOSIS — Z13.1 ENCOUNTER FOR SCREENING FOR DIABETES MELLITUS: ICD-10-CM

## 2025-03-29 DIAGNOSIS — E78.49 OTHER HYPERLIPIDEMIA: ICD-10-CM

## 2025-03-29 DIAGNOSIS — E66.09 CLASS 1 OBESITY DUE TO EXCESS CALORIES WITHOUT SERIOUS COMORBIDITY WITH BODY MASS INDEX (BMI) OF 30.0 TO 30.9 IN ADULT: ICD-10-CM

## 2025-03-29 LAB
ALBUMIN SERPL BCG-MCNC: 4.2 G/DL (ref 3.5–5)
ALP SERPL-CCNC: 56 U/L (ref 34–104)
ALT SERPL W P-5'-P-CCNC: 19 U/L (ref 7–52)
ANION GAP SERPL CALCULATED.3IONS-SCNC: 10 MMOL/L (ref 4–13)
AST SERPL W P-5'-P-CCNC: 13 U/L (ref 13–39)
BASOPHILS # BLD AUTO: 0.06 THOUSANDS/ÂΜL (ref 0–0.1)
BASOPHILS NFR BLD AUTO: 1 % (ref 0–1)
BILIRUB SERPL-MCNC: 0.43 MG/DL (ref 0.2–1)
BUN SERPL-MCNC: 14 MG/DL (ref 5–25)
CALCIUM SERPL-MCNC: 9.1 MG/DL (ref 8.4–10.2)
CHLORIDE SERPL-SCNC: 105 MMOL/L (ref 96–108)
CHOLEST SERPL-MCNC: 213 MG/DL (ref ?–200)
CO2 SERPL-SCNC: 23 MMOL/L (ref 21–32)
CREAT SERPL-MCNC: 0.62 MG/DL (ref 0.6–1.3)
EOSINOPHIL # BLD AUTO: 0.17 THOUSAND/ÂΜL (ref 0–0.61)
EOSINOPHIL NFR BLD AUTO: 3 % (ref 0–6)
ERYTHROCYTE [DISTWIDTH] IN BLOOD BY AUTOMATED COUNT: 13.9 % (ref 11.6–15.1)
EST. AVERAGE GLUCOSE BLD GHB EST-MCNC: 123 MG/DL
GFR SERPL CREATININE-BSD FRML MDRD: 98 ML/MIN/1.73SQ M
GLUCOSE P FAST SERPL-MCNC: 87 MG/DL (ref 65–99)
HBA1C MFR BLD: 5.9 %
HCT VFR BLD AUTO: 43.4 % (ref 34.8–46.1)
HDLC SERPL-MCNC: 50 MG/DL
HGB BLD-MCNC: 13.9 G/DL (ref 11.5–15.4)
IMM GRANULOCYTES # BLD AUTO: 0.02 THOUSAND/UL (ref 0–0.2)
IMM GRANULOCYTES NFR BLD AUTO: 0 % (ref 0–2)
LDLC SERPL CALC-MCNC: 139 MG/DL (ref 0–100)
LYMPHOCYTES # BLD AUTO: 1.99 THOUSANDS/ÂΜL (ref 0.6–4.47)
LYMPHOCYTES NFR BLD AUTO: 38 % (ref 14–44)
MCH RBC QN AUTO: 28.5 PG (ref 26.8–34.3)
MCHC RBC AUTO-ENTMCNC: 32 G/DL (ref 31.4–37.4)
MCV RBC AUTO: 89 FL (ref 82–98)
MONOCYTES # BLD AUTO: 0.43 THOUSAND/ÂΜL (ref 0.17–1.22)
MONOCYTES NFR BLD AUTO: 8 % (ref 4–12)
NEUTROPHILS # BLD AUTO: 2.57 THOUSANDS/ÂΜL (ref 1.85–7.62)
NEUTS SEG NFR BLD AUTO: 50 % (ref 43–75)
NRBC BLD AUTO-RTO: 0 /100 WBCS
PLATELET # BLD AUTO: 314 THOUSANDS/UL (ref 149–390)
PMV BLD AUTO: 9.6 FL (ref 8.9–12.7)
POTASSIUM SERPL-SCNC: 4.4 MMOL/L (ref 3.5–5.3)
PROT SERPL-MCNC: 7.1 G/DL (ref 6.4–8.4)
RBC # BLD AUTO: 4.88 MILLION/UL (ref 3.81–5.12)
SODIUM SERPL-SCNC: 138 MMOL/L (ref 135–147)
TRIGL SERPL-MCNC: 119 MG/DL (ref ?–150)
TSH SERPL DL<=0.05 MIU/L-ACNC: 1.89 UIU/ML (ref 0.45–4.5)
WBC # BLD AUTO: 5.24 THOUSAND/UL (ref 4.31–10.16)

## 2025-03-29 PROCEDURE — 85025 COMPLETE CBC W/AUTO DIFF WBC: CPT

## 2025-03-29 PROCEDURE — 80053 COMPREHEN METABOLIC PANEL: CPT

## 2025-03-29 PROCEDURE — 84443 ASSAY THYROID STIM HORMONE: CPT

## 2025-03-29 PROCEDURE — 83036 HEMOGLOBIN GLYCOSYLATED A1C: CPT

## 2025-03-29 PROCEDURE — 80061 LIPID PANEL: CPT

## 2025-03-29 PROCEDURE — 36415 COLL VENOUS BLD VENIPUNCTURE: CPT

## 2025-03-30 ENCOUNTER — RESULTS FOLLOW-UP (OUTPATIENT)
Dept: FAMILY MEDICINE CLINIC | Facility: CLINIC | Age: 60
End: 2025-03-30

## 2025-04-02 ENCOUNTER — RA CDI HCC (OUTPATIENT)
Dept: OTHER | Facility: HOSPITAL | Age: 60
End: 2025-04-02

## 2025-04-02 NOTE — PROGRESS NOTES
HCC coding opportunities       Chart reviewed, no opportunity found: CHART REVIEWED, NO OPPORTUNITY FOUND   This is a reminder to address (resolve/update/assess) ALL HCC (risk adjustment) codes as found on active problem list for 2025 as patient scores reset to zero JEREMÍAS.     Patients Insurance        Commercial Insurance: [employer]

## 2025-04-11 ENCOUNTER — OFFICE VISIT (OUTPATIENT)
Dept: OBGYN CLINIC | Facility: CLINIC | Age: 60
End: 2025-04-11
Payer: COMMERCIAL

## 2025-04-11 ENCOUNTER — OFFICE VISIT (OUTPATIENT)
Dept: FAMILY MEDICINE CLINIC | Facility: CLINIC | Age: 60
End: 2025-04-11
Payer: COMMERCIAL

## 2025-04-11 VITALS — WEIGHT: 183 LBS | HEIGHT: 66 IN | BODY MASS INDEX: 29.41 KG/M2

## 2025-04-11 VITALS
SYSTOLIC BLOOD PRESSURE: 122 MMHG | HEART RATE: 104 BPM | BODY MASS INDEX: 29.35 KG/M2 | RESPIRATION RATE: 16 BRPM | TEMPERATURE: 98 F | OXYGEN SATURATION: 95 % | HEIGHT: 66 IN | WEIGHT: 182.6 LBS | DIASTOLIC BLOOD PRESSURE: 78 MMHG

## 2025-04-11 DIAGNOSIS — G25.81 RESTLESS LEG SYNDROME: ICD-10-CM

## 2025-04-11 DIAGNOSIS — K21.9 GASTROESOPHAGEAL REFLUX DISEASE WITHOUT ESOPHAGITIS: ICD-10-CM

## 2025-04-11 DIAGNOSIS — E78.49 OTHER HYPERLIPIDEMIA: ICD-10-CM

## 2025-04-11 DIAGNOSIS — M26.622 ARTHRALGIA OF LEFT TEMPOROMANDIBULAR JOINT: ICD-10-CM

## 2025-04-11 DIAGNOSIS — M22.2X2 PATELLOFEMORAL DISORDER OF LEFT KNEE: Primary | ICD-10-CM

## 2025-04-11 DIAGNOSIS — G89.29 CHRONIC PAIN OF LEFT KNEE: ICD-10-CM

## 2025-04-11 DIAGNOSIS — E66.811 CLASS 1 OBESITY DUE TO EXCESS CALORIES WITHOUT SERIOUS COMORBIDITY WITH BODY MASS INDEX (BMI) OF 30.0 TO 30.9 IN ADULT: ICD-10-CM

## 2025-04-11 DIAGNOSIS — E66.09 CLASS 1 OBESITY DUE TO EXCESS CALORIES WITHOUT SERIOUS COMORBIDITY WITH BODY MASS INDEX (BMI) OF 30.0 TO 30.9 IN ADULT: ICD-10-CM

## 2025-04-11 DIAGNOSIS — M25.562 CHRONIC PAIN OF LEFT KNEE: ICD-10-CM

## 2025-04-11 DIAGNOSIS — R73.02 IGT (IMPAIRED GLUCOSE TOLERANCE): ICD-10-CM

## 2025-04-11 DIAGNOSIS — Z12.31 ENCOUNTER FOR SCREENING MAMMOGRAM FOR BREAST CANCER: ICD-10-CM

## 2025-04-11 DIAGNOSIS — J30.9 ALLERGIC RHINITIS, UNSPECIFIED SEASONALITY, UNSPECIFIED TRIGGER: ICD-10-CM

## 2025-04-11 DIAGNOSIS — Z00.00 ENCOUNTER FOR WELLNESS EXAMINATION IN ADULT: Primary | ICD-10-CM

## 2025-04-11 PROCEDURE — 99396 PREV VISIT EST AGE 40-64: CPT | Performed by: FAMILY MEDICINE

## 2025-04-11 PROCEDURE — 99213 OFFICE O/P EST LOW 20 MIN: CPT | Performed by: FAMILY MEDICINE

## 2025-04-11 PROCEDURE — 20610 DRAIN/INJ JOINT/BURSA W/O US: CPT | Performed by: ORTHOPAEDIC SURGERY

## 2025-04-11 PROCEDURE — 99214 OFFICE O/P EST MOD 30 MIN: CPT | Performed by: ORTHOPAEDIC SURGERY

## 2025-04-11 RX ORDER — FLUTICASONE PROPIONATE 50 MCG
2 SPRAY, SUSPENSION (ML) NASAL DAILY
Qty: 48 G | Refills: 3 | Status: SHIPPED | OUTPATIENT
Start: 2025-04-11 | End: 2025-04-23

## 2025-04-11 RX ORDER — OMEPRAZOLE 40 MG/1
40 CAPSULE, DELAYED RELEASE ORAL DAILY
Qty: 100 CAPSULE | Refills: 1 | Status: SHIPPED | OUTPATIENT
Start: 2025-04-11

## 2025-04-11 RX ORDER — PRAMIPEXOLE DIHYDROCHLORIDE 0.12 MG/1
TABLET ORAL
Qty: 270 TABLET | Refills: 3 | Status: SHIPPED | OUTPATIENT
Start: 2025-04-11

## 2025-04-11 RX ORDER — TRIAMCINOLONE ACETONIDE 40 MG/ML
80 INJECTION, SUSPENSION INTRA-ARTICULAR; INTRAMUSCULAR
Status: COMPLETED | OUTPATIENT
Start: 2025-04-11 | End: 2025-04-11

## 2025-04-11 RX ORDER — ONDANSETRON 4 MG/1
4 TABLET, ORALLY DISINTEGRATING ORAL 3 TIMES DAILY PRN
Qty: 20 TABLET | Refills: 2 | Status: SHIPPED | OUTPATIENT
Start: 2025-04-11

## 2025-04-11 RX ORDER — BUPIVACAINE HYDROCHLORIDE 5 MG/ML
2 INJECTION, SOLUTION EPIDURAL; INTRACAUDAL; PERINEURAL
Status: COMPLETED | OUTPATIENT
Start: 2025-04-11 | End: 2025-04-11

## 2025-04-11 RX ORDER — METHYLPREDNISOLONE 4 MG/1
TABLET ORAL
Qty: 21 EACH | Refills: 1 | Status: SHIPPED | OUTPATIENT
Start: 2025-04-11

## 2025-04-11 RX ADMIN — TRIAMCINOLONE ACETONIDE 80 MG: 40 INJECTION, SUSPENSION INTRA-ARTICULAR; INTRAMUSCULAR at 09:15

## 2025-04-11 RX ADMIN — BUPIVACAINE HYDROCHLORIDE 2 ML: 5 INJECTION, SOLUTION EPIDURAL; INTRACAUDAL; PERINEURAL at 09:15

## 2025-04-11 NOTE — ASSESSMENT & PLAN NOTE
Patient reports overall improvement of Mirapex.  Lower doses seem to be more efficacious  Orders:  •  pramipexole (MIRAPEX) 0.125 mg tablet; Take 1 tablet at dinner and 2 tablets at bedtime

## 2025-04-11 NOTE — ASSESSMENT & PLAN NOTE
Intermittent flare of GERD while on Wegovy.  Omeprazole and PRN soft  Orders:  •  omeprazole (PriLOSEC) 40 MG capsule; Take 1 capsule (40 mg total) by mouth daily  •  ondansetron (ZOFRAN-ODT) 4 mg disintegrating tablet; Take 1 tablet (4 mg total) by mouth 3 (three) times a day as needed for nausea or vomiting

## 2025-04-11 NOTE — PROGRESS NOTES
Name: Jen Clark      : 1965      MRN: [id number]  Encounter Provider: Blaze Gallegos DO  Encounter Date: 2025   Encounter department: [facility]  :  Assessment & Plan  Patellofemoral disorder of left knee    Orders:    XR knee 3 vw left non injury; Future    XR knee 1 or 2 vw right; Future    Large joint arthrocentesis: L knee    Chronic pain of left knee    Orders:    XR knee 3 vw left non injury; Future    XR knee 1 or 2 vw right; Future    Large joint arthrocentesis: L knee         Jen Clark is a pleasant 59 y.o. female who returns today for follow-up evaluation for her left knee.  She did well following corticosteroid injection at her last visit on 2024.  I offered to repeat this for her today.  She consented to and underwent left knee injection as documented below without issue or complication and this was well-tolerated by the patient.  Postinjection instructions were provided.  She understands it can be repeated no sooner than 3 months.  I encouraged her to continue with activity to her tolerance.  We will plan to see her back on an as-needed basis.  Large joint arthrocentesis: L knee  Universal Protocol:  procedure performed by consultantConsent: Verbal consent obtained.  Risks and benefits: risks, benefits and alternatives were discussed  Consent given by: patient  Patient understanding: patient states understanding of the procedure being performed  Site marked: the operative site was marked  Patient identity confirmed: verbally with patient  Supporting Documentation  Indications: pain   Procedure Details  Location: knee - L knee  Preparation: Patient was prepped and draped in the usual sterile fashion  Needle size: 20 G  Ultrasound guidance: no  Approach: anterolateral  Medications administered: 80 mg triamcinolone acetonide 40 mg/mL; 2 mL bupivacaine (PF) 0.5 %    Patient tolerance: patient tolerated the procedure well with no immediate  "complications  Dressing:  Sterile dressing applied          Return if symptoms worsen or fail to improve.    I have personally reviewed pertinent imaging in PACS.    History: Jen Clark is a 59 y.o. female who returns today for follow-up evaluation for her left knee.  She received a corticosteroid injection on 8/23/2024.  At today's visit, she reports this provided excellent relief for her.  Her activity related pain returned a few weeks ago after a prolonged walk with her dog.  She complains of aching pain about the knee that can be severe.  She has been using ice and Advil which has been helping to relieve her pain.  She denies any new injury or trauma.    Estimated body mass index is 29.54 kg/m² as calculated from the following:    Height as of this encounter: 5' 6\" (1.676 m).    Weight as of this encounter: 83 kg (183 lb).    Lab Results   Component Value Date    HGBA1C 5.9 (H) 03/29/2025       Social History     Occupational History    Not on file   Tobacco Use    Smoking status: Never    Smokeless tobacco: Never   Vaping Use    Vaping status: Never Used   Substance and Sexual Activity    Alcohol use: Yes     Alcohol/week: 2.0 standard drinks of alcohol     Types: 2 Cans of beer per week     Comment: occasional    Drug use: Never    Sexual activity: Yes     Partners: Male     Birth control/protection: Post-menopausal       Objective:  Left Knee Exam     Tenderness   The patient is experiencing tenderness in the medial joint line and patella.    Range of Motion   Extension:  0   Flexion:  120     Tests   Varus: negative Valgus: negative  Lachman:  Anterior - negative        Other   Erythema: absent  Sensation: normal  Pulse: present  Swelling: none  Effusion: no effusion present    Comments:  Stable at 0, 30 and 90 degrees  Neurovascular intact distally  No warmth erythema  Parapatellar crepitance noted  Patellofemoral grind: Positive            Observations   Left Knee   Negative for effusion.       There " were no vitals filed for this visit.    Subjective:  Past Medical History:   Diagnosis Date    Arthralgia of right acromioclavicular joint 7/24/2017    Lyme disease     Postmenopausal bleeding 3/22/2021    Situational anxiety 11/8/2017    Small bowel obstruction (HCC)     Strain of deltoid muscle, left, initial encounter 7/13/2021       Past Surgical History:   Procedure Laterality Date    COLONOSCOPY  2013    DILATION AND CURETTAGE OF UTERUS      OVARIAN CYST REMOVAL  1995    PA ARTHRP ACETBLR/PROX FEM PROSTC AGRFT/ALGRFT Left 4/1/2024    Procedure: ARTHROPLASTY HIP TOTAL ANTERIOR,NAVIGATED - same day;  Surgeon: Blaze Gallegos DO;  Location: [facility];  Service: Orthopedics    PA LAPS ABD PRTM&OMENTUM DX W/WO SPEC BR/WA SPX N/A 2/21/2021    Procedure: LAPAROSCOPY DIAGNOSTIC, exploratory laparotomy, lysis of adhesions, REDUCTION OF INTERNAL HERNIA;  Surgeon: Cris Luevano MD;  Location: [facility];  Service: General       Family History   Problem Relation Age of Onset    Atrial fibrillation Mother     Hyperlipidemia Mother     Cancer Mother     Leukemia Mother 82    Heart disease Father     Hypertension Father     Hyperlipidemia Father     Diabetes Father     No Known Problems Sister     No Known Problems Maternal Grandmother     Prostate cancer Maternal Grandfather 50        Richard Prattsheri    No Known Problems Paternal Grandmother     Diabetes Brother     No Known Problems Maternal Aunt     No Known Problems Maternal Aunt     No Known Problems Maternal Aunt     Prostate cancer Maternal Uncle 59    Breast cancer Paternal Aunt 35    No Known Problems Paternal Aunt          Current Outpatient Medications:     amoxicillin (AMOXIL) 500 MG tablet, Take 4 tablets (2,000 mg total) by mouth 60 minutes pre-procedure, Disp: 8 tablet, Rfl: 2    brompheniramine-pseudoephedrine-DM 30-2-10 MG/5ML syrup, Take 10 mL by mouth 4 (four) times a day as needed for congestion or cough, Disp: 120 mL, Rfl: 0    Iron-Vitamin C 100-250 MG  TABS, Take by mouth 3 (three) times a week, Disp: , Rfl:     Multiple Vitamin (MULTI-VITAMIN DAILY) TABS, Take 1 tablet by mouth daily, Disp: , Rfl:     pantoprazole (PROTONIX) 40 mg tablet, Take 1 tablet once a day as needed for acid reflux symptoms, Disp: 30 tablet, Rfl: 1    pramipexole (MIRAPEX) 0.25 mg tablet, TAKE 1 TABLET DAILY AFTER DINNER, Disp: 90 tablet, Rfl: 3    Semaglutide-Weight Management (WEGOVY) 0.5 MG/0.5ML, Inject 0.5 mL (0.5 mg total) under the skin once a week, Disp: 2 mL, Rfl: 1    Insulin Pen Needle 34G X 3.5 MM MISC, Use with Zepbound injections once a week (Patient not taking: Reported on 4/17/2024), Disp: 4 each, Rfl: 11    LORazepam (ATIVAN) 0.5 mg tablet, Take 1 tablet (0.5 mg total) by mouth as needed for anxiety (Launch) Take as need for dental work (Patient not taking: Reported on 4/17/2024), Disp: 30 tablet, Rfl: 0    Sodium Fluoride 5000 PPM 1.1 % PSTE, , Disp: , Rfl:     tretinoin (RETIN-A) 0.025 % cream, Apply thin film topically once daily (Patient not taking: Reported on 8/3/2024), Disp: 45 g, Rfl: 3    valACYclovir (VALTREX) 1,000 mg tablet, Take 2 tablets twice a day as needed for cold sore x 1 day., Disp: 4 tablet, Rfl: 2    Allergies   Allergen Reactions    Codeine Dizziness       Review of Systems   Constitutional:  Positive for activity change. Negative for chills, fever and unexpected weight change.   HENT:  Negative for hearing loss, nosebleeds and sore throat.    Eyes:  Negative for pain, redness and visual disturbance.   Respiratory:  Negative for cough, shortness of breath and wheezing.    Cardiovascular:  Negative for chest pain, palpitations and leg swelling.   Gastrointestinal:  Negative for abdominal pain, nausea and vomiting.   Endocrine: Negative for polydipsia and polyuria.   Genitourinary:  Negative for dysuria and hematuria.   Musculoskeletal:  See HPI  Skin:  Negative for rash and wound.   Neurological:  Negative for dizziness, numbness and headaches.    Psychiatric/Behavioral:  Negative for decreased concentration and suicidal ideas. The patient is not nervous/anxious.      Physical Exam  Vitals and nursing note reviewed.   Constitutional:       Appearance: Normal appearance. She is well-developed.   HENT:      Head: Normocephalic and atraumatic.      Right Ear: External ear normal.      Left Ear: External ear normal.   Eyes:      General: No scleral icterus.     Extraocular Movements: Extraocular movements intact.      Conjunctiva/sclera: Conjunctivae normal.   Cardiovascular:      Rate and Rhythm: Normal rate.   Pulmonary:      Effort: Pulmonary effort is normal. No respiratory distress.   Musculoskeletal:      Cervical back: Normal range of motion and neck supple.      Comments: See Ortho exam   Skin:     General: Skin is warm and dry.   Neurological:      General: No focal deficit present.      Mental Status: She is alert and oriented to person, place, and time.   Psychiatric:         Behavior: Behavior normal.     Scribe Attestation      I,:  Neo Garner am acting as a scribe while in the presence of the attending physician.:       I,:  Blaze Gallegos, DO personally performed the services described in this documentation    as scribed in my presence.:           This document was created using speech voice recognition software.   Grammatical errors, random word insertions, pronoun errors, and incomplete sentences are an occasional consequence of this system due to software limitations, ambient noise, and hardware issues.   Any formal questions or concerns about content, text, or information contained within the body of this dictation should be directly addressed to the provider for clarification.

## 2025-04-11 NOTE — ASSESSMENT & PLAN NOTE
Patient just restarted Wegovy  She is completing second months of 0.5 mg weekly and will be starting 1 mg weekly thereafter.  She has been tolerating medication well so far and will keep you posted regarding her progress

## 2025-04-11 NOTE — PATIENT INSTRUCTIONS
we will start Wegovy 1 mg as of next week  You may use omeprazole once a day as needed for acid reflux  You may take Zofran as needed for nausea vomiting  Please make sure your bowel movements are regular, fiber daily, MiraLAX and Dulcolax as needed  Please message me with an update after 2 weeks of Wegovy Rx  Dose of Mirapex 0.125 mg and dinner and 0.125-0.2 5 at night  Medrol Dosepak for left TMJ, can be repeated if needed  Mammogram will be due in July, please schedule  Blood work prior to next office visit: Lipid panel and hemoglobin A1c  Please consider shingles vaccine, our office of pharmacy.  2 doses 2 to 6 months apart  If sinus symptoms are recurring-I would refer to ENT.  Please complete dental workup first it could be the cause of chronic sinus trouble

## 2025-04-11 NOTE — PROGRESS NOTES
Adult Annual Physical  Name: Jen Clark      : 1965      MRN: [id number]  Encounter Provider: Diana Gallagher MD  Encounter Date: 2025   Encounter department: [facility]    :  Assessment & Plan  Encounter for wellness examination in adult         Gastroesophageal reflux disease without esophagitis  Intermittent flare of GERD while on Wegovy.  Omeprazole and PRN soft  Orders:  •  omeprazole (PriLOSEC) 40 MG capsule; Take 1 capsule (40 mg total) by mouth daily  •  ondansetron (ZOFRAN-ODT) 4 mg disintegrating tablet; Take 1 tablet (4 mg total) by mouth 3 (three) times a day as needed for nausea or vomiting    Class 1 obesity due to excess calories without serious comorbidity with body mass index (BMI) of 30.0 to 30.9 in adult  Patient just restarted Wegovy  She is completing second months of 0.5 mg weekly and will be starting 1 mg weekly thereafter.  She has been tolerating medication well so far and will keep you posted regarding her progress       Other hyperlipidemia  Monitor labs  Orders:  •  Lipid Panel with Direct LDL reflex; Future    IGT (impaired glucose tolerance)    Orders:  •  Hemoglobin A1C; Future    Restless leg syndrome  Patient reports overall improvement of Mirapex.  Lower doses seem to be more efficacious  Orders:  •  pramipexole (MIRAPEX) 0.125 mg tablet; Take 1 tablet at dinner and 2 tablets at bedtime    Allergic rhinitis, unspecified seasonality, unspecified trigger    Orders:  •  fluticasone (FLONASE) 50 mcg/act nasal spray; 2 sprays into each nostril daily    Encounter for screening mammogram for breast cancer    Orders:  •  Mammo screening bilateral w 3d and cad; Future    Arthralgia of left temporomandibular joint  Left TMJ arthralgia after recent dental implant.  Start Medrol Dosepak.  Follow-up with the dentist  Orders:  •  methylPREDNISolone 4 MG tablet therapy pack; Use as directed on package      Preventive Screenings:  - Diabetes Screening:  screening up-to-date  - Cholesterol Screening: screening not indicated and has hyperlipidemia   - Hepatitis C screening: screening up-to-date   - HIV screening: screening up-to-date   - Cervical cancer screening: screening up-to-date   - Breast cancer screening: screening up-to-date   - Colon cancer screening: screening up-to-date   - Lung cancer screening: screening not indicated     Immunizations:  - Immunizations due: Zoster (Shingrix)  - Risks/benefits immunizations discussed      Counseling/Anticipatory Guidance:    - Diet: discussed recommendations for a healthy/well-balanced diet.   - Exercise: the importance of regular exercise/physical activity was discussed. Recommend exercise 3-5 times per week for at least 30 minutes.       Depression Screening and Follow-up Plan: Patient was screened for depression during today's encounter. They screened negative with a PHQ-2 score of 1.        Patient Instructions   we will start Wegovy 1 mg as of next week  You may use omeprazole once a day as needed for acid reflux  You may take Zofran as needed for nausea vomiting  Please make sure your bowel movements are regular, fiber daily, MiraLAX and Dulcolax as needed  Please message me with an update after 2 weeks of Wegovy Rx  Dose of Mirapex 0.125 mg and dinner and 0.125-0.2 5 at night  Medrol Dosepak for left TMJ, can be repeated if needed  Mammogram will be due in July, please schedule  Blood work prior to next office visit: Lipid panel and hemoglobin A1c  Please consider shingles vaccine, our office of pharmacy.  2 doses 2 to 6 months apart  If sinus symptoms are recurring-I would refer to ENT.  Please complete dental workup first it could be the cause of chronic sinus trouble    Return in about 6 months (around 10/11/2025) for follow up.    History of Present Illness     Adult Annual Physical:  Patient presents for annual physical. Annual well exam and follow-up.  Results of recent lab work reviewed.  Hemoglobin A1c is  5.9, total cholesterol 213 with LDL of 139, otherwise normal  Patient has recently restarted Wegovy.  Reports intermittent symptoms of reflux.  She has tried omeprazole and it helped quite a bit.  She would like to have Rx on hand  Current dose 0.5 mg weekly for 2 months, patient has 1 mg dose on hand and will be starting it next week  New dental implant followed by flare of TMJ on the left..  Painful jaw, painful range of motion, pain radiates to the left ear.  Chronic RLS symptoms, Mirapex has been helpful overall, interestingly lower dose of 0.25 mg seems to be more efficacious    Patient is under care of GYN  Up-to-date with mammogram, next study is due in July.     Diet and Physical Activity:  - Diet/Nutrition: well balanced diet, portion control, limited junk food and adequate fiber intake.  - Exercise: walking, moderate cardiovascular exercise and 3-4 times a week on average.    Depression Screening:  - PHQ-2 Score: 1    General Health:  - Sleep: sleeps poorly and 4-6 hours of sleep on average. Restless Limb Syndrome  - Hearing: decreased hearing bilateral ears.  - Vision: most recent eye exam < 1 year ago and wears glasses.  - Dental: regular dental visits, brushes teeth twice daily and floss regularly. TMJ issues    /GYN Health:  - Follows with GYN: yes.   - Menopause: postmenopausal.   - History of STDs: no    Advanced Care Planning:  - Has an advanced directive?: no    - Has a durable medical POA?: no      Review of Systems   HENT:  Positive for congestion.    Eyes: Negative.    Respiratory: Negative.     Cardiovascular: Negative.    Gastrointestinal: Negative.    Musculoskeletal: Negative.    Skin: Negative.    Allergic/Immunologic: Positive for environmental allergies.   Neurological: Negative.    Psychiatric/Behavioral:  Positive for sleep disturbance.          Objective   /78 (BP Location: Left arm, Patient Position: Sitting, Cuff Size: Standard)   Pulse 104   Temp 98 °F (36.7 °C) (Temporal)  "  Resp 16   Ht 5' 6\" (1.676 m)   Wt 82.8 kg (182 lb 9.6 oz)   LMP 06/22/2016   SpO2 95%   BMI 29.47 kg/m²     Physical Exam  Vitals and nursing note reviewed.   Constitutional:       General: She is not in acute distress.     Appearance: Normal appearance. She is well-developed. She is not ill-appearing.   HENT:      Head: Normocephalic and atraumatic.      Comments: Tenderness at left TMJ, reduced range of motion due to pain     Right Ear: Tympanic membrane normal.      Left Ear: Tympanic membrane normal.   Eyes:      General: No scleral icterus.     Conjunctiva/sclera: Conjunctivae normal.   Neck:      Vascular: No carotid bruit.   Cardiovascular:      Rate and Rhythm: Regular rhythm. Tachycardia present.      Heart sounds: Normal heart sounds. No murmur heard.     Comments: Heart rate is regular, 96 to 104 bpm  Pulmonary:      Effort: Pulmonary effort is normal. No respiratory distress.      Breath sounds: Normal breath sounds.   Abdominal:      General: Bowel sounds are normal. There is no abdominal bruit.      Palpations: Abdomen is soft.      Tenderness: There is no abdominal tenderness.   Musculoskeletal:      Cervical back: Neck supple. No rigidity.      Right lower leg: No edema.      Left lower leg: No edema.   Skin:     General: Skin is warm.   Neurological:      General: No focal deficit present.      Mental Status: She is alert and oriented to person, place, and time.   Psychiatric:         Mood and Affect: Mood normal.         Behavior: Behavior normal.         "

## 2025-04-16 PROBLEM — J30.9 ALLERGIC RHINITIS: Status: ACTIVE | Noted: 2025-04-16

## 2025-04-16 NOTE — ASSESSMENT & PLAN NOTE
Orders:  •  fluticasone (FLONASE) 50 mcg/act nasal spray; 2 sprays into each nostril daily   Blood glucose has improved significantly, A1c is 7.3 which is good  Kidneys have slowed down little bit but we will continue to monitor  No change in any of the medications  Since the blood glucose has improved the kidneys may improve 2.

## 2025-04-23 ENCOUNTER — TELEPHONE (OUTPATIENT)
Age: 60
End: 2025-04-23

## 2025-04-23 DIAGNOSIS — J30.9 ALLERGIC RHINITIS, UNSPECIFIED SEASONALITY, UNSPECIFIED TRIGGER: Primary | ICD-10-CM

## 2025-04-23 RX ORDER — MOMETASONE FUROATE MONOHYDRATE 50 UG/1
2 SPRAY, METERED NASAL DAILY
Qty: 51 G | Refills: 1 | Status: SHIPPED | OUTPATIENT
Start: 2025-04-23

## 2025-04-23 NOTE — TELEPHONE ENCOUNTER
Patient was seen on 4/11 and was given [facility]on[facility] for [facility]. They denied it. Patient is requesting if she can have Nasonex instead it is better for restless legs. Please call to advise accordingly and if appropriate please send script for [facility]onex to [facility].    Thank you!!

## 2025-05-05 ENCOUNTER — TELEPHONE (OUTPATIENT)
Dept: FAMILY MEDICINE CLINIC | Facility: CLINIC | Age: 60
End: 2025-05-05

## 2025-05-05 DIAGNOSIS — E66.811 CLASS 1 OBESITY DUE TO EXCESS CALORIES WITHOUT SERIOUS COMORBIDITY WITH BODY MASS INDEX (BMI) OF 30.0 TO 30.9 IN ADULT: Primary | ICD-10-CM

## 2025-05-05 DIAGNOSIS — E66.09 CLASS 1 OBESITY DUE TO EXCESS CALORIES WITHOUT SERIOUS COMORBIDITY WITH BODY MASS INDEX (BMI) OF 30.0 TO 30.9 IN ADULT: Primary | ICD-10-CM

## 2025-05-05 NOTE — TELEPHONE ENCOUNTER
Pt calling asking for a refill on her Wegovy 1mg to be refilled. Pt unsure if new dose needs to be sent. Asking for medication to be [facility] to [facility]

## 2025-05-07 NOTE — TELEPHONE ENCOUNTER
"Called and spoke with patient and relay providers message. Patient verbalized understanding.    Patient states \" Can Dr. Gallagher send 90 days supply thru [facility]?. Thank you\"    Please send Rx Dr. Gallagher, Thank you  "

## 2025-05-07 NOTE — TELEPHONE ENCOUNTER
Dose of Wegovy is usually being titrated every few months.  Does patient prefer me to send 90-day supply of Wegovy 1 mg to [facility]?  Please clarify.  Thank you

## 2025-05-09 NOTE — TELEPHONE ENCOUNTER
Pt called. Currently on Wegovy at 1 mg. Out of medication and due for next dose on Sunday.     Prescription was sent to [email or web address] on 5/7/25 and received. [facility] informed Pt that they cannot fill until they receive approval from ordering Provider. Pt believes this is to show that she had taken the lower dose previously.     Pt would like to know if PCP can provide the needed information in time for her next dose, this Sunday, or if the script needs to be sent to [facility] where she initially had the medication filled? Pt states that a similar situation happened in the past where she could not obtain the medication for 2 weeks, and when she restarted, she did not do well.     Please review with PCP and advise Pt on options for medicaiton. If PA is needed, Pt confirmed that her insurance is up to date.

## 2025-05-09 NOTE — TELEPHONE ENCOUNTER
There is an active approval on file from 02/21/25-02/21/26. This approval is for all doses. I do not see where PA team submitted this PA request.

## 2025-05-09 NOTE — TELEPHONE ENCOUNTER
"As a physician I cannot provide further \"approval\" aside from sending prescription.      If patient needs a prior authorization-then this message should be sent to prior authorization team.      If patient would like me to send a 30-day supply to [facility]-please let me know.      Thank you  "

## 2025-05-15 ENCOUNTER — PATIENT MESSAGE (OUTPATIENT)
Dept: FAMILY MEDICINE CLINIC | Facility: CLINIC | Age: 60
End: 2025-05-15

## 2025-05-16 NOTE — PATIENT COMMUNICATION
Jen will be seeing a dentist on Tuesday and will call us if she still needs to schedule an appointment.    No further action is required at this time.

## 2025-07-08 DIAGNOSIS — E66.811 CLASS 1 OBESITY DUE TO EXCESS CALORIES WITHOUT SERIOUS COMORBIDITY WITH BODY MASS INDEX (BMI) OF 30.0 TO 30.9 IN ADULT: ICD-10-CM

## 2025-07-08 DIAGNOSIS — E66.09 CLASS 1 OBESITY DUE TO EXCESS CALORIES WITHOUT SERIOUS COMORBIDITY WITH BODY MASS INDEX (BMI) OF 30.0 TO 30.9 IN ADULT: ICD-10-CM

## 2025-07-10 NOTE — TELEPHONE ENCOUNTER
Patient called for status of Wegovy     She advised this is being sent to a mail order pharmacy and it takes a bit longer to get in the mail.    She will need to have by Tuesday July 15th for her next injection    She is asking if this can be sent as soon as possible

## 2025-07-15 ENCOUNTER — TELEPHONE (OUTPATIENT)
Dept: FAMILY MEDICINE CLINIC | Facility: CLINIC | Age: 60
End: 2025-07-15

## 2025-07-21 NOTE — TELEPHONE ENCOUNTER
PA for (WEGOVY) 1 MG/0.5ML APPROVED AS MAINTENANCE     Date(s) approved 07/21/25-02/21/26    Case # N/A    Patient advised by          [][employer] Message  [x]Phone call   [x]LMOM  []L/M to call office as no active Communication consent on file  []Unable to leave detailed message as VM not approved on Communication consent       Pharmacy advised by    [x]Fax  []Phone call  []Secure Chat    Approval letter scanned into Media Yes      
PA for (WEGOVY) 1 MG/0.5ML SUBMITTED to [employer]    via    [x]CMM-KEY: [id number]  []Surescripts-Case ID #   []Availity-Auth ID # NDC #   []Faxed to plan   []Other website   []Phone call Case ID #     [x]PA sent as URGENT    All office notes, labs and other pertaining documents and studies sent. Clinical questions answered. Awaiting determination from insurance company.     Turnaround time for your insurance to make a decision on your Prior Authorization can take 7-21 business days.                 
Patient called to get a renewal of Wegovy and was advised by [facility] that there are limitations on her plan and they require medical documentation. I advised patient of insurance needing a prior authorization and that it was submitted today 7/21. Patient was concerned as it will be one week without the medication. I spoke to [facility] who advised patient that she can reach out to insurance company to check status of PA.   
3

## (undated) DEVICE — DRAPE UTILITY

## (undated) DEVICE — DRAPE LAPAROTOMY W/POUCHES

## (undated) DEVICE — BIPOLAR SEALER 23-113-1 AQM 2.3: Brand: AQUAMANTYS™

## (undated) DEVICE — GLOVE SRG BIOGEL 8.5

## (undated) DEVICE — SINGLE BASIN: Brand: CARDINAL HEALTH

## (undated) DEVICE — GLOVE INDICATOR PI UNDERGLOVE SZ 8.5 BLUE

## (undated) DEVICE — SUT STRATAFIX SPIRAL 3-0 PGA/PCL 30 X 30 CM SXMD2B410

## (undated) DEVICE — 3M™ IOBAN™ 2 ANTIMICROBIAL INCISE DRAPE 6650EZ: Brand: IOBAN™ 2

## (undated) DEVICE — BASIC DOUBLE BASIN 2-LF: Brand: MEDLINE INDUSTRIES, INC.

## (undated) DEVICE — INTENDED FOR TISSUE SEPARATION, AND OTHER PROCEDURES THAT REQUIRE A SHARP SURGICAL BLADE TO PUNCTURE OR CUT.: Brand: BARD-PARKER SAFETY BLADES SIZE 15, STERILE

## (undated) DEVICE — 450 ML BOTTLE OF 0.05% CHLORHEXIDINE GLUCONATE IN 99.95% STERILE WATER FOR IRRIGATION, USP AND APPLICATOR.: Brand: IRRISEPT ANTIMICROBIAL WOUND LAVAGE

## (undated) DEVICE — ARTHROSCOPY FLOOR MAT

## (undated) DEVICE — PAD CAST 4 IN COTTON NON STERILE

## (undated) DEVICE — ANTI-FOG SOLUTION WITH FOAM PAD: Brand: DEVON

## (undated) DEVICE — INSTRUMENT POUCH: Brand: CONVERTORS

## (undated) DEVICE — INSUFFLATION NEEDLE TO ESTABLISH PNEUMOPERITONEUM.: Brand: INSUFFLATION NEEDLE

## (undated) DEVICE — TIBURON SPLIT SHEET: Brand: CONVERTORS

## (undated) DEVICE — IRRIG ENDO FLO TUBING

## (undated) DEVICE — ASTOUND SURGICAL GOWN, XXX LARGE, X-LONG: Brand: CONVERTORS

## (undated) DEVICE — VEST SURGEON DISPOSABLE

## (undated) DEVICE — DRAPE C-ARM X-RAY

## (undated) DEVICE — DRAPE SHEET X-LG

## (undated) DEVICE — DRESSING MEPILEX AG BORDER POST-OP 4 X 8 IN

## (undated) DEVICE — HOOD: Brand: FLYTE, SURGICOOL

## (undated) DEVICE — INSUFFLATION TUBING PRIMFLO

## (undated) DEVICE — SUT STRATFIX SPIRAL PDS PLUS 1 CT-1/CT-1 12IN SXPP2B403

## (undated) DEVICE — HANDPIECE SET WITH HIGH FLOW TIP AND SUCTION TUBE: Brand: INTERPULSE

## (undated) DEVICE — GLOVE INDICATOR PI UNDERGLOVE SZ 7.5 BLUE

## (undated) DEVICE — SUT MONOCRYL 4-0 PS-2 18 IN Y496G

## (undated) DEVICE — POSITIONER HANA TABLE PACK

## (undated) DEVICE — ADHESIVE SKIN HIGH VISCOSITY EXOFIN 1ML

## (undated) DEVICE — TUBE MINI KAMVAC SUCTION 7310 7 LATEX FREE

## (undated) DEVICE — PACK MAJOR ORTHO W/SPLITS PBDS

## (undated) DEVICE — SUT VICRYL 0 CP-1 27 IN J267H

## (undated) DEVICE — INTENDED FOR TISSUE SEPARATION, AND OTHER PROCEDURES THAT REQUIRE A SHARP SURGICAL BLADE TO PUNCTURE OR CUT.: Brand: BARD-PARKER ® CARBON RIB-BACK BLADES

## (undated) DEVICE — SYRINGE 10ML LL

## (undated) DEVICE — SKIN MARKER DUAL TIP WITH RULER CAP, FLEXIBLE RULER AND LABELS: Brand: DEVON

## (undated) DEVICE — CHLORAPREP HI-LITE 26ML ORANGE

## (undated) DEVICE — PACK GENERAL LF

## (undated) DEVICE — GLOVE SRG BIOGEL ECLIPSE 6

## (undated) DEVICE — SUT ETHIBOND 2 V-37 30 IN MX69G

## (undated) DEVICE — FOOT SWITCH DRAPE: Brand: UNBRANDED

## (undated) DEVICE — ELECTRODE BLADE E-Z CLEAN 6.5IN -0014

## (undated) DEVICE — OSCILLATING TIP SAW CARTRIDGE: Brand: PRECISION FALCON

## (undated) DEVICE — DRESSING MEPILEX AG BORDER 4 X 8 IN

## (undated) DEVICE — GLOVE INDICATOR UNDERGLOVE SZ 6.5 GREEN

## (undated) DEVICE — NEPTUNE E-SEP SMOKE EVACUATION PENCIL, COATED, 70MM BLADE, PUSH BUTTON SWITCH: Brand: NEPTUNE E-SEP

## (undated) DEVICE — HARMONIC ACE 5MM DIAMETER SHEARS 36CM SHAFT LENGTH + ADAPTIVE TISSUE TECHNOLOGY FOR USE WITH GENERATOR G11: Brand: HARMONIC ACE

## (undated) DEVICE — LAPAROSCOPIC TROCAR SLEEVE/SINGLE USE: Brand: KII® OPTICAL ACCESS SYSTEM

## (undated) DEVICE — 3/8 INCH SMOKE TUBING

## (undated) DEVICE — ANTIBACTERIAL UNDYED BRAIDED (POLYGLACTIN 910), SYNTHETIC ABSORBABLE SUTURE: Brand: COATED VICRYL

## (undated) DEVICE — GLOVE SRG BIOGEL 8

## (undated) DEVICE — CAPIT HIP COP -CMNT/POR-ACTIS

## (undated) DEVICE — WEBRIL 6 IN UNSTERILE

## (undated) DEVICE — CAPIT UPCHRG EMPHASYS ACETABULAR

## (undated) DEVICE — 3M™ STERI-DRAPE™ U-DRAPE 1015: Brand: STERI-DRAPE™